# Patient Record
Sex: MALE | Race: OTHER | NOT HISPANIC OR LATINO
[De-identification: names, ages, dates, MRNs, and addresses within clinical notes are randomized per-mention and may not be internally consistent; named-entity substitution may affect disease eponyms.]

---

## 2015-05-05 RX ORDER — AMLODIPINE BESYLATE 2.5 MG/1
1 TABLET ORAL
Qty: 0 | Refills: 0 | DISCHARGE
Start: 2015-05-05

## 2017-01-04 ENCOUNTER — APPOINTMENT (OUTPATIENT)
Dept: CARDIOLOGY | Facility: CLINIC | Age: 52
End: 2017-01-04

## 2017-01-20 ENCOUNTER — APPOINTMENT (OUTPATIENT)
Dept: GASTROENTEROLOGY | Facility: CLINIC | Age: 52
End: 2017-01-20

## 2017-02-15 ENCOUNTER — APPOINTMENT (OUTPATIENT)
Dept: CARDIOLOGY | Facility: CLINIC | Age: 52
End: 2017-02-15

## 2017-03-08 ENCOUNTER — NON-APPOINTMENT (OUTPATIENT)
Age: 52
End: 2017-03-08

## 2017-03-08 ENCOUNTER — APPOINTMENT (OUTPATIENT)
Dept: CARDIOLOGY | Facility: CLINIC | Age: 52
End: 2017-03-08

## 2017-03-08 VITALS
HEIGHT: 68 IN | HEART RATE: 77 BPM | SYSTOLIC BLOOD PRESSURE: 119 MMHG | DIASTOLIC BLOOD PRESSURE: 76 MMHG | WEIGHT: 160 LBS | BODY MASS INDEX: 24.25 KG/M2 | OXYGEN SATURATION: 96 %

## 2017-03-08 DIAGNOSIS — I10 ESSENTIAL (PRIMARY) HYPERTENSION: ICD-10-CM

## 2017-06-21 ENCOUNTER — MEDICATION RENEWAL (OUTPATIENT)
Age: 52
End: 2017-06-21

## 2017-06-21 RX ORDER — SIMVASTATIN 20 MG/1
20 TABLET, FILM COATED ORAL
Qty: 30 | Refills: 0 | Status: DISCONTINUED | COMMUNITY
End: 2017-06-21

## 2017-08-15 ENCOUNTER — OUTPATIENT (OUTPATIENT)
Dept: OUTPATIENT SERVICES | Facility: HOSPITAL | Age: 52
LOS: 1 days | End: 2017-08-15

## 2017-08-15 DIAGNOSIS — I25.810 ATHEROSCLEROSIS OF CORONARY ARTERY BYPASS GRAFT(S) WITHOUT ANGINA PECTORIS: Chronic | ICD-10-CM

## 2017-08-16 DIAGNOSIS — A49.9 BACTERIAL INFECTION, UNSPECIFIED: ICD-10-CM

## 2017-08-22 ENCOUNTER — OTHER (OUTPATIENT)
Age: 52
End: 2017-08-22

## 2017-10-07 LAB
POTASSIUM SERPL-MCNC: 5.3 MMOL/L — SIGNIFICANT CHANGE UP (ref 3.5–5.3)
POTASSIUM SERPL-SCNC: 5.3 MMOL/L — SIGNIFICANT CHANGE UP (ref 3.5–5.3)

## 2017-11-09 LAB
POTASSIUM SERPL-MCNC: 5.7 MMOL/L — HIGH (ref 3.5–5.3)
POTASSIUM SERPL-SCNC: 5.7 MMOL/L — HIGH (ref 3.5–5.3)

## 2019-02-01 ENCOUNTER — APPOINTMENT (OUTPATIENT)
Dept: WOUND CARE | Facility: CLINIC | Age: 54
End: 2019-02-01
Payer: MEDICARE

## 2019-02-01 VITALS — DIASTOLIC BLOOD PRESSURE: 74 MMHG | HEART RATE: 85 BPM | SYSTOLIC BLOOD PRESSURE: 112 MMHG

## 2019-02-01 PROCEDURE — 99204 OFFICE O/P NEW MOD 45 MIN: CPT

## 2019-02-01 RX ORDER — ATORVASTATIN CALCIUM 10 MG/1
10 TABLET, FILM COATED ORAL DAILY
Qty: 90 | Refills: 1 | Status: DISCONTINUED | COMMUNITY
Start: 2017-06-21 | End: 2019-02-01

## 2019-02-02 NOTE — HISTORY OF PRESENT ILLNESS
[FreeTextEntry1] : Mr. EDGAR CLAUDIO   presents to the office with a wound for 2 weeks duration.  The wound is located on  the right lateral calf .  The patient has complaints of spasm in area at night.   The patient has been dressing the wound with antibiotic ointment, seemed to be worsening, saw primary care who ordered augmentin  last friday 1/25/19.  The patient denies fevers or chills.  The patient has localized pain to the wound upon dressing changes.  The patient has no other complaints or associated symptoms.  \par \par

## 2019-02-02 NOTE — PHYSICAL EXAM
[Normal Breath Sounds] : Normal breath sounds [Normal Heart Sounds] : normal heart sounds [Ankle Swelling Bilaterally] : bilaterally  [Calm] : calm [Ankle Swelling (On Exam)] : not present [de-identified] : well nourished, pleasant, good historian [de-identified] : soft, +BS [de-identified] : dialysis [de-identified] : see wound assessment [FreeTextEntry1] : anterior calf [FreeTextEntry2] : 0.5 [FreeTextEntry3] : 0.2 [FreeTextEntry4] : 0.1 cm [de-identified] : medihoney

## 2019-02-02 NOTE — REVIEW OF SYSTEMS
[General Appearance - Alert] : alert [General Appearance - In No Acute Distress] : in no acute distress [General Appearance - Well Nourished] : well nourished [General Appearance - Well Developed] : well developed [Sclera] : the sclera and conjunctiva were normal [Outer Ear] : the ears and nose were normal in appearance [Oropharynx] : the oropharynx was normal [Neck Appearance] : the appearance of the neck was normal [Neck Cervical Mass (___cm)] : no neck mass was observed [Jugular Venous Distention Increased] : there was no jugular-venous distention [Thyroid Diffuse Enlargement] : the thyroid was not enlarged [As Noted in HPI] : as noted in HPI [Thyroid Nodule] : there were no palpable thyroid nodules [Auscultation Breath Sounds / Voice Sounds] : lungs were clear to auscultation bilaterally [Skin Wound] : skin wound [Heart Rate And Rhythm] : heart rate was normal and rhythm regular [Negative] : Heme/Lymph [FreeTextEntry8] : dialysis pt. [Heart Sounds] : normal S1 and S2 [de-identified] : wound right calf [Heart Sounds Gallop] : no gallops [Murmurs] : no murmurs [Heart Sounds Pericardial Friction Rub] : no pericardial rub [Full Pulse] : the pedal pulses are present [Edema] : there was no peripheral edema [Bowel Sounds] : normal bowel sounds [Abdomen Soft] : soft [Abdomen Tenderness] : non-tender [] : no hepato-splenomegaly [Abdomen Mass (___ Cm)] : no abdominal mass palpated [Abdomen Hernia] : no hernia was discovered [Normal Sphincter Tone] : normal sphincter tone [No Rectal Mass] : no rectal mass [Internal Hemorrhoid] : internal hemorrhoids [Prostate Enlargement] : the prostate was not enlarged [Prostate Tenderness] : the prostate was not tender [Cervical Lymph Nodes Enlarged Posterior Bilaterally] : posterior cervical [Cervical Lymph Nodes Enlarged Anterior Bilaterally] : anterior cervical [Supraclavicular Lymph Nodes Enlarged Bilaterally] : supraclavicular [Axillary Lymph Nodes Enlarged Bilaterally] : axillary [Femoral Lymph Nodes Enlarged Bilaterally] : femoral [Inguinal Lymph Nodes Enlarged Bilaterally] : inguinal [No CVA Tenderness] : no ~M costovertebral angle tenderness [No Spinal Tenderness] : no spinal tenderness [Abnormal Walk] : normal gait [Nail Clubbing] : no clubbing  or cyanosis of the fingernails [Musculoskeletal - Swelling] : no joint swelling seen [Motor Tone] : muscle strength and tone were normal [Skin Color & Pigmentation] : normal skin color and pigmentation [Skin Turgor] : normal skin turgor [Oriented To Time, Place, And Person] : oriented to person, place, and time [Impaired Insight] : insight and judgment were intact [Affect] : the affect was normal [External Hemorrhoid] : no external hemorrhoids [Occult Blood Positive] : stool was negative for occult blood [FreeTextEntry1] : no cutaneous stigmata of chronic liver disease

## 2019-02-02 NOTE — ASSESSMENT
[FreeTextEntry1] : 53 yr old male PMH ESRD on hemodialysis for 9 yrs. presents with right calf wound for 2 weeks, was applying antibiotic ointment at first, then saw primary care who prescribed augmentin last week.\par Patient underwent evaluation for peripheral arterial disease using a [employer] diagnostic machine.  Ankle brachial index was obtained using blood pressure cuffs of the ankle and brachial segments of both legs.  A distal pulse volume recording was noted digitally on the device.  The procedure was supervised and interpreted by the physician.  VALE of the right lower extremity non detectable waveform.   VALE of the left lower extremity non detectable waveform.  Waveform noted to be monophasic.   Patient tolerated procedure well in the office.  Results discussed with the patient.\par Script given for vascular testing, f/u 1 week after vascular test done\par Pt. will do his own wound care, medihoney, savage\par finish antibiotic, Augmentin\[facility] \par

## 2019-02-25 ENCOUNTER — APPOINTMENT (OUTPATIENT)
Dept: VASCULAR SURGERY | Facility: CLINIC | Age: 54
End: 2019-02-25
Payer: MEDICARE

## 2019-02-25 ENCOUNTER — APPOINTMENT (OUTPATIENT)
Dept: WOUND CARE | Facility: CLINIC | Age: 54
End: 2019-02-25
Payer: MEDICARE

## 2019-02-25 DIAGNOSIS — N18.6 END STAGE RENAL DISEASE: ICD-10-CM

## 2019-02-25 DIAGNOSIS — N25.0 RENAL OSTEODYSTROPHY: ICD-10-CM

## 2019-02-25 DIAGNOSIS — L97.912 NON-PRESSURE CHRONIC ULCER OF UNSPECIFIED PART OF RIGHT LOWER LEG WITH FAT LAYER EXPOSED: ICD-10-CM

## 2019-02-25 PROCEDURE — 99213 OFFICE O/P EST LOW 20 MIN: CPT

## 2019-02-25 PROCEDURE — 93923 UPR/LXTR ART STDY 3+ LVLS: CPT

## 2019-02-25 NOTE — REVIEW OF SYSTEMS
[As Noted in HPI] : as noted in HPI [Skin Wound] : skin wound [Negative] : Heme/Lymph [FreeTextEntry8] : dialysis pt. [de-identified] : wound right calf

## 2019-02-25 NOTE — HISTORY OF PRESENT ILLNESS
[FreeTextEntry1] : Mr. EGDAR CLAUDIO   presents to the office with a wound for 2 weeks duration.  The wound is located on  the right lateral calf .  The patient has complaints of spasm in area at night.   The patient has been dressing the wound with antibiotic ointment, seemed to be worsening, saw primary care who ordered Augmentin  last Friday 1/25/19.  The patient denies fevers or chills.  The patient has localized pain to the wound upon dressing changes.  The patient has no other complaints or associated symptoms.\par 2/25/19-No VN- performs dressing changes himself- Bought supplies on [employer]\par On HD x 9 yrs  \par \par

## 2019-02-25 NOTE — ASSESSMENT
[FreeTextEntry1] : 53 yr old male PMH ESRD on hemodialysis for 9 yrs. presents with right calf wound for 2 weeks, was applying antibiotic ointment at first, then saw primary care who prescribed Augmentin last week.\par Patient underwent evaluation for peripheral arterial disease using a [employer] diagnostic machine.  Ankle brachial index was obtained using blood pressure cuffs of the ankle and brachial segments of both legs.  A distal pulse volume recording was noted digitally on the device.  The procedure was supervised and interpreted by the physician.  VALE of the right lower extremity non detectable waveform.   VALE of the left lower extremity non detectable waveform.  Waveform noted to be monophasic.   Patient tolerated procedure well in the office.  Results discussed with the patient.\par Script given for vascular testing, f/u 1 week after vascular test done -- Vascular exam completed 2/25, results discussed.\par Pt. will do his own wound care, savage Torre\par 2/25/19 - discussed possible calciphylaxis, and need to report additional , or worsening of lesions\par Will continue Honey as wounds reported as improved\par \par \par \par

## 2019-02-25 NOTE — PHYSICAL EXAM
[Calm] : calm [JVD] : no jugular venous distention  [Skin Ulcer] : ulcer [Alert] : alert [de-identified] : well nourished, pleasant, good historian [de-identified] : non labored [FreeTextEntry1] : lateral calf prox [FreeTextEntry2] : .5 [FreeTextEntry3] : 0.3 [FreeTextEntry4] : 0.1 cm [de-identified] : ?calciphylaxis? [de-identified] : curette [de-identified] : Medihoney [FreeTextEntry7] : lateral calf distal [FreeTextEntry8] : .5 [FreeTextEntry9] : .5 [de-identified] : 0.1 [de-identified] : ?calciphylaxis? [FreeTextEntry6] : curette [de-identified] : medihoney

## 2019-03-11 ENCOUNTER — APPOINTMENT (OUTPATIENT)
Dept: WOUND CARE | Facility: CLINIC | Age: 54
End: 2019-03-11

## 2019-05-01 ENCOUNTER — APPOINTMENT (OUTPATIENT)
Dept: CARDIOLOGY | Facility: CLINIC | Age: 54
End: 2019-05-01

## 2020-01-22 ENCOUNTER — APPOINTMENT (OUTPATIENT)
Dept: NEPHROLOGY | Facility: CLINIC | Age: 55
End: 2020-01-22

## 2020-01-22 ENCOUNTER — APPOINTMENT (OUTPATIENT)
Dept: TRANSPLANT | Facility: CLINIC | Age: 55
End: 2020-01-22

## 2020-07-29 ENCOUNTER — APPOINTMENT (OUTPATIENT)
Dept: CARDIOLOGY | Facility: CLINIC | Age: 55
End: 2020-07-29

## 2020-12-02 ENCOUNTER — APPOINTMENT (OUTPATIENT)
Dept: HEPATOLOGY | Facility: CLINIC | Age: 55
End: 2020-12-02
Payer: MEDICARE

## 2020-12-02 ENCOUNTER — APPOINTMENT (OUTPATIENT)
Dept: HEPATOLOGY | Facility: CLINIC | Age: 55
End: 2020-12-02

## 2020-12-02 VITALS
RESPIRATION RATE: 16 BRPM | WEIGHT: 167 LBS | DIASTOLIC BLOOD PRESSURE: 93 MMHG | SYSTOLIC BLOOD PRESSURE: 169 MMHG | BODY MASS INDEX: 25.31 KG/M2 | OXYGEN SATURATION: 100 % | HEART RATE: 67 BPM | HEIGHT: 68 IN | TEMPERATURE: 97.6 F

## 2020-12-02 DIAGNOSIS — B18.2 CHRONIC VIRAL HEPATITIS C: ICD-10-CM

## 2020-12-02 PROCEDURE — 99204 OFFICE O/P NEW MOD 45 MIN: CPT

## 2020-12-02 NOTE — ASSESSMENT
[FreeTextEntry1] : 54 y/o M w/ history of hypertension and ESRD on HD for years, previously seen for HCV back in 2016 but never followed up after confirmation.\par \par 1. HCV - will repeat w/u in anticipation for HCV treatment. Suspect that he has SULTANA as well.\par - repeat Fibroscan and Ultrasound ordered.\par \par 2. Polycystic kidney disease, ESRD on HD - will reach out to kidney transplant team to see if he needs to follow-up with them.\par \par 3. RHM\par - immune to HBV and HAV\par - needs colonoscopy, will plan to schedule at next appt.\par \par I have explained to the patient the natural history of hepatitis C virus infection including the potential progression to cirrhosis and risk of HCC. I have also discussed with Mr. EDGAR CLAUDIO current FDA approved hepatitis C treatment options, drug and drug interaction, risks and benefits, side effects and SVR of the various treatment regimens.\par Mr. EDGAR CLAUDIO is interested in HCV treatment and is agreeable to it. \par \par I have ordered blood work to screen for concurrent liver disease (including HBV), HCV genotype, HCV antibody with reflex to RNA PCR, and a Fibroscan to assess for cirrhosis. We will proceed with authorization and approval for HCV treatment when above tests are completed.\par \par I have advised the patient on maintaining a healthy lifestyle which includes keeping a healthy diet (Mediterranean diet is preferred), calorie reduction of 30%, and regular exercise of at least 150 minutes a week to improve His fatty liver disease. I have also advised Mr. CLAUDIO on avoidance of hepatotoxic agents and alcohol.\par \par RTC 2 months

## 2020-12-02 NOTE — REVIEW OF SYSTEMS
[Feeling Tired] : feeling tired [Vomiting] : vomiting [Limb Weakness] : limb weakness [Difficulty Walking] : difficulty walking [Fever] : no fever [Chills] : no chills [Recent Weight Gain (___ Lbs)] : no recent weight gain [Recent Weight Loss (___ Lbs)] : no recent weight loss [Eye Pain] : no eye pain [Red Eyes] : eyes not red [Loss Of Hearing] : no hearing loss [Nosebleeds] : no nosebleeds [Sore Throat] : no sore throat [Chest Pain] : no chest pain [Palpitations] : no palpitations [Abdominal Pain] : no abdominal pain [Constipation] : no constipation [Diarrhea] : no diarrhea [Melena] : no melena [Dysuria] : no dysuria [Joint Pain] : no joint pain [Limb Swelling] : no limb swelling [Skin Lesions] : no skin lesions [Itching] : no itching [Confused] : no confusion [Convulsions] : no convulsions [Fainting] : no fainting [Anxiety] : no anxiety [Depression] : no depression [Proptosis] : no proptosis [Muscle Weakness] : no muscle weakness [Feelings Of Weakness] : no feelings of weakness [Easy Bleeding] : no tendency for easy bleeding [Easy Bruising] : no tendency for easy bruising

## 2020-12-02 NOTE — PHYSICAL EXAM
[General Appearance - Alert] : alert [General Appearance - In No Acute Distress] : in no acute distress [General Appearance - Well Nourished] : well nourished [General Appearance - Well Developed] : well developed [Sclera] : the sclera and conjunctiva were normal [PERRL With Normal Accommodation] : pupils were equal in size, round, and reactive to light [Extraocular Movements] : extraocular movements were intact [Outer Ear] : the ears and nose were normal in appearance [Oropharynx] : the oropharynx was normal [Neck Appearance] : the appearance of the neck was normal [Neck Cervical Mass (___cm)] : no neck mass was observed [Jugular Venous Distention Increased] : there was no jugular-venous distention [Auscultation Breath Sounds / Voice Sounds] : lungs were clear to auscultation bilaterally [Edema] : there was no peripheral edema [Bowel Sounds] : normal bowel sounds [Abdomen Soft] : soft [Abdomen Tenderness] : non-tender [Abdomen Mass (___ Cm)] : no abdominal mass palpated [Cervical Lymph Nodes Enlarged Posterior Bilaterally] : posterior cervical [Cervical Lymph Nodes Enlarged Anterior Bilaterally] : anterior cervical [No CVA Tenderness] : no ~M costovertebral angle tenderness [No Spinal Tenderness] : no spinal tenderness [Involuntary Movements] : no involuntary movements were seen [Skin Turgor] : normal skin turgor [] : no rash [Oriented To Time, Place, And Person] : oriented to person, place, and time [Impaired Insight] : insight and judgment were intact [Affect] : the affect was normal [Scleral Icterus] : No Scleral Icterus [Spider Angioma] : No spider angioma(s) were observed [Abdominal  Ascites] : no ascites [Splenomegaly] : no splenomegaly [Liver Palpable ___ Finger Breadths Below Costal Margin] : was not palpable below costal margin [Asterixis] : no asterixis observed [Jaundice] : No jaundice [FreeTextEntry1] : on wheelchair

## 2020-12-02 NOTE — HISTORY OF PRESENT ILLNESS
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 He is immune to hepatitis A and B. \par He denies IVDA, body piercing, tattoos, blood transfusion or alcohol use. \par He denies family history of liver disease or liver cancer or jaundice. \par BW - FEb. 2016 revealed normal AST, ALT was 6,  and normal TB. normal albumin and TP.\par LFTs 2 years ago revealed AST 52, ALT 85, , TB 0.7. normal Albumin and TP. \par US of the abdomen from March 2, 2016 reported multiple simple hepatic cysts, polycystic kidney disease, borderline splenomegaly and no ascites.\par BW on 3/13/16 - HCV RNA+, genotype not possible due to low viral load. ferritin 505.\par Fibroscan 4/22/16 - 8.6 kPa c/w F1-F2 disease.\par Never had a colonoscopy. Has not followed up in kidney transplant clinic but per last note, needs improved ambulation, colonoscopy, and cardiology eval for that.

## 2020-12-02 NOTE — CONSULT LETTER
[Mohamud Tuttle M.D.] : Mohamud Tuttle M.D. [Dear  ___] : Dear  [unfilled], [Consult Letter:] : I had the pleasure of evaluating your patient, [unfilled]. [( Thank you for referring [unfilled] for consultation for _____ )] : Thank you for referring [unfilled] for consultation for [unfilled] [Please see my note below.] : Please see my note below. [Consult Closing:] : Thank you very much for allowing me to participate in the care of this patient.  If you have any questions, please do not hesitate to contact me. [Sincerely,] : Sincerely, [FreeTextEntry2] : Tuan Collins MD [FreeTextEntry3] : Dr. Bashir Schaefer MD\par  of Medicine\par [facility]\par [facility]\par

## 2020-12-03 LAB
AFP-TM SERPL-MCNC: 4.5 NG/ML
ALBUMIN SERPL ELPH-MCNC: 4.5 G/DL
ALP BLD-CCNC: 71 U/L
ALT SERPL-CCNC: 11 U/L
ANION GAP SERPL CALC-SCNC: 14 MMOL/L
AST SERPL-CCNC: 13 U/L
BASOPHILS # BLD AUTO: 0.05 K/UL
BASOPHILS NFR BLD AUTO: 1.3 %
BILIRUB SERPL-MCNC: 0.8 MG/DL
BUN SERPL-MCNC: 27 MG/DL
CALCIUM SERPL-MCNC: 9.3 MG/DL
CHLORIDE SERPL-SCNC: 97 MMOL/L
CO2 SERPL-SCNC: 26 MMOL/L
CREAT SERPL-MCNC: 6.41 MG/DL
EOSINOPHIL # BLD AUTO: 0.28 K/UL
EOSINOPHIL NFR BLD AUTO: 7.2 %
FERRITIN SERPL-MCNC: 617 NG/ML
GGT SERPL-CCNC: 12 U/L
GLUCOSE SERPL-MCNC: 89 MG/DL
HBV SURFACE AB SER QL: REACTIVE
HBV SURFACE AG SER QL: NONREACTIVE
HCT VFR BLD CALC: 38.5 %
HCV RNA SERPL NAA DL=5-ACNC: NOT DETECTED IU/ML
HCV RNA SERPL NAA+PROBE-LOG IU: NOT DETECTED LOG10IU/ML
HEPATITIS A IGG ANTIBODY: REACTIVE
HGB BLD-MCNC: 12.7 G/DL
HIV1+2 AB SPEC QL IA.RAPID: NONREACTIVE
IMM GRANULOCYTES NFR BLD AUTO: 0.3 %
INR PPP: 1.08 RATIO
LYMPHOCYTES # BLD AUTO: 1.17 K/UL
LYMPHOCYTES NFR BLD AUTO: 30 %
MAN DIFF?: NORMAL
MCHC RBC-ENTMCNC: 31.3 PG
MCHC RBC-ENTMCNC: 33 GM/DL
MCV RBC AUTO: 94.8 FL
MONOCYTES # BLD AUTO: 0.32 K/UL
MONOCYTES NFR BLD AUTO: 8.2 %
NEUTROPHILS # BLD AUTO: 2.07 K/UL
NEUTROPHILS NFR BLD AUTO: 53 %
PLATELET # BLD AUTO: 171 K/UL
POTASSIUM SERPL-SCNC: 5.7 MMOL/L
PROT SERPL-MCNC: 7.8 G/DL
PT BLD: 12.7 SEC
RBC # BLD: 4.06 M/UL
RBC # FLD: 13.7 %
SARS-COV-2 IGG SERPL IA-ACNC: <0.1 INDEX
SARS-COV-2 IGG SERPL QL IA: NEGATIVE
SODIUM SERPL-SCNC: 137 MMOL/L
WBC # FLD AUTO: 3.9 K/UL

## 2020-12-07 LAB — HCV GENTYP BLD NAA+PROBE: NOT DETECTED

## 2020-12-16 ENCOUNTER — APPOINTMENT (OUTPATIENT)
Dept: HEPATOLOGY | Facility: CLINIC | Age: 55
End: 2020-12-16

## 2021-01-06 ENCOUNTER — APPOINTMENT (OUTPATIENT)
Dept: HEPATOLOGY | Facility: CLINIC | Age: 56
End: 2021-01-06

## 2021-02-03 ENCOUNTER — APPOINTMENT (OUTPATIENT)
Dept: HEPATOLOGY | Facility: CLINIC | Age: 56
End: 2021-02-03

## 2021-03-01 ENCOUNTER — APPOINTMENT (OUTPATIENT)
Dept: HEPATOLOGY | Facility: CLINIC | Age: 56
End: 2021-03-01

## 2021-06-30 ENCOUNTER — APPOINTMENT (OUTPATIENT)
Dept: CARDIOLOGY | Facility: CLINIC | Age: 56
End: 2021-06-30
Payer: MEDICARE

## 2021-06-30 ENCOUNTER — NON-APPOINTMENT (OUTPATIENT)
Age: 56
End: 2021-06-30

## 2021-06-30 VITALS
OXYGEN SATURATION: 98 % | BODY MASS INDEX: 27.06 KG/M2 | HEIGHT: 68 IN | DIASTOLIC BLOOD PRESSURE: 84 MMHG | HEART RATE: 67 BPM | SYSTOLIC BLOOD PRESSURE: 147 MMHG | WEIGHT: 178.57 LBS

## 2021-06-30 PROCEDURE — 99204 OFFICE O/P NEW MOD 45 MIN: CPT

## 2021-06-30 PROCEDURE — 93000 ELECTROCARDIOGRAM COMPLETE: CPT

## 2021-06-30 RX ORDER — FERRIC CITRATE 210 MG/1
1 GM TABLET, COATED ORAL
Refills: 0 | Status: ACTIVE | COMMUNITY
Start: 2021-06-30

## 2021-06-30 NOTE — PHYSICAL EXAM
[General Appearance - Well Developed] : well developed [Normal Appearance] : normal appearance [Well Groomed] : well groomed [No Deformities] : no deformities [General Appearance - Well Nourished] : well nourished [General Appearance - In No Acute Distress] : no acute distress [Normal Conjunctiva] : the conjunctiva exhibited no abnormalities [Eyelids - No Xanthelasma] : the eyelids demonstrated no xanthelasmas [Normal Oral Mucosa] : normal oral mucosa [No Oral Pallor] : no oral pallor [No Oral Cyanosis] : no oral cyanosis [Normal Jugular Venous A Waves Present] : normal jugular venous A waves present [Normal Jugular Venous V Waves Present] : normal jugular venous V waves present [No Jugular Venous Chaves A Waves] : no jugular venous chaves A waves [Respiration, Rhythm And Depth] : normal respiratory rhythm and effort [Exaggerated Use Of Accessory Muscles For Inspiration] : no accessory muscle use [Auscultation Breath Sounds / Voice Sounds] : lungs were clear to auscultation bilaterally [Heart Sounds] : normal S1 and S2 [Heart Rate And Rhythm] : heart rate and rhythm were normal [FreeTextEntry1] : 2/6 margaret [Abdomen Soft] : soft [Abdomen Tenderness] : non-tender [Abdomen Mass (___ Cm)] : no abdominal mass palpated [Abnormal Walk] : normal gait [Gait - Sufficient For Exercise Testing] : the gait was sufficient for exercise testing [Nail Clubbing] : no clubbing of the fingernails [Cyanosis, Localized] : no localized cyanosis [Petechial Hemorrhages (___cm)] : no petechial hemorrhages [] : no ischemic changes

## 2021-06-30 NOTE — HISTORY OF PRESENT ILLNESS
[FreeTextEntry1] : He is s/p cabg in 2014..  He had bilateral hip replacements in 2016. He was unable to walk before his hip replacement.  He is now able to walk with a walker for 30 minutes. No chest pain or sob.  Follows a diet.  Still waiting for a kidney transplant.

## 2022-01-05 ENCOUNTER — APPOINTMENT (OUTPATIENT)
Dept: CARDIOLOGY | Facility: CLINIC | Age: 57
End: 2022-01-05

## 2022-02-03 ENCOUNTER — RX RENEWAL (OUTPATIENT)
Age: 57
End: 2022-02-03

## 2022-02-03 RX ORDER — ISOSORBIDE MONONITRATE 30 MG/1
30 TABLET, EXTENDED RELEASE ORAL
Qty: 90 | Refills: 1 | Status: ACTIVE | COMMUNITY
Start: 2022-02-03 | End: 1900-01-01

## 2022-06-10 ENCOUNTER — APPOINTMENT (OUTPATIENT)
Dept: GASTROENTEROLOGY | Facility: CLINIC | Age: 57
End: 2022-06-10

## 2022-06-23 ENCOUNTER — APPOINTMENT (OUTPATIENT)
Dept: CARDIOLOGY | Facility: CLINIC | Age: 57
End: 2022-06-23

## 2022-07-06 ENCOUNTER — APPOINTMENT (OUTPATIENT)
Dept: HEPATOLOGY | Facility: CLINIC | Age: 57
End: 2022-07-06

## 2022-08-01 ENCOUNTER — RX RENEWAL (OUTPATIENT)
Age: 57
End: 2022-08-01

## 2022-09-28 ENCOUNTER — NON-APPOINTMENT (OUTPATIENT)
Age: 57
End: 2022-09-28

## 2022-09-28 ENCOUNTER — APPOINTMENT (OUTPATIENT)
Dept: CARDIOLOGY | Facility: CLINIC | Age: 57
End: 2022-09-28
Payer: MEDICARE

## 2022-09-28 VITALS
BODY MASS INDEX: 27.4 KG/M2 | OXYGEN SATURATION: 100 % | SYSTOLIC BLOOD PRESSURE: 154 MMHG | DIASTOLIC BLOOD PRESSURE: 79 MMHG | HEIGHT: 68 IN | HEART RATE: 70 BPM | WEIGHT: 180.78 LBS

## 2022-09-28 PROCEDURE — 93000 ELECTROCARDIOGRAM COMPLETE: CPT

## 2022-09-28 PROCEDURE — 99214 OFFICE O/P EST MOD 30 MIN: CPT

## 2022-09-28 NOTE — CARDIOLOGY SUMMARY
[___] : [unfilled]
I personally saw the patient with the PA, and completed the key components of the history and physical exam. I then discussed the management plan with the PA.

## 2022-09-28 NOTE — REASON FOR VISIT
[Symptom and Test Evaluation] : symptom and test evaluation [Consultation] : a consultation regarding [Coronary Artery Disease] : coronary artery disease

## 2022-10-11 ENCOUNTER — APPOINTMENT (OUTPATIENT)
Dept: GASTROENTEROLOGY | Facility: CLINIC | Age: 57
End: 2022-10-11

## 2022-10-14 ENCOUNTER — APPOINTMENT (OUTPATIENT)
Dept: CARDIOLOGY | Facility: CLINIC | Age: 57
End: 2022-10-14

## 2022-10-14 ENCOUNTER — INPATIENT (INPATIENT)
Facility: HOSPITAL | Age: 57
LOS: 8 days | Discharge: HOME HEALTH | End: 2022-10-23
Attending: INTERNAL MEDICINE | Admitting: INTERNAL MEDICINE
Payer: MEDICARE

## 2022-10-14 VITALS
SYSTOLIC BLOOD PRESSURE: 157 MMHG | HEIGHT: 68 IN | TEMPERATURE: 98 F | DIASTOLIC BLOOD PRESSURE: 79 MMHG | HEART RATE: 67 BPM | RESPIRATION RATE: 18 BRPM | OXYGEN SATURATION: 99 %

## 2022-10-14 DIAGNOSIS — I25.810 ATHEROSCLEROSIS OF CORONARY ARTERY BYPASS GRAFT(S) WITHOUT ANGINA PECTORIS: Chronic | ICD-10-CM

## 2022-10-14 LAB
ALBUMIN SERPL ELPH-MCNC: 3.4 G/DL — SIGNIFICANT CHANGE UP (ref 3.3–5)
ALP SERPL-CCNC: 157 U/L — HIGH (ref 40–120)
ALT FLD-CCNC: 15 U/L — SIGNIFICANT CHANGE UP (ref 4–41)
ANION GAP SERPL CALC-SCNC: 13 MMOL/L — SIGNIFICANT CHANGE UP (ref 7–14)
AST SERPL-CCNC: 34 U/L — SIGNIFICANT CHANGE UP (ref 4–40)
BASOPHILS # BLD AUTO: 0.02 K/UL — SIGNIFICANT CHANGE UP (ref 0–0.2)
BASOPHILS NFR BLD AUTO: 0.9 % — SIGNIFICANT CHANGE UP (ref 0–2)
BILIRUB SERPL-MCNC: 0.8 MG/DL — SIGNIFICANT CHANGE UP (ref 0.2–1.2)
BLOOD GAS VENOUS COMPREHENSIVE RESULT: SIGNIFICANT CHANGE UP
BUN SERPL-MCNC: 33 MG/DL — HIGH (ref 7–23)
CALCIUM SERPL-MCNC: 10.7 MG/DL — HIGH (ref 8.4–10.5)
CHLORIDE SERPL-SCNC: 94 MMOL/L — LOW (ref 98–107)
CO2 SERPL-SCNC: 27 MMOL/L — SIGNIFICANT CHANGE UP (ref 22–31)
CREAT SERPL-MCNC: 6.67 MG/DL — HIGH (ref 0.5–1.3)
EGFR: 9 ML/MIN/1.73M2 — LOW
EOSINOPHIL # BLD AUTO: 0 K/UL — SIGNIFICANT CHANGE UP (ref 0–0.5)
EOSINOPHIL NFR BLD AUTO: 0 % — SIGNIFICANT CHANGE UP (ref 0–6)
FLUAV AG NPH QL: SIGNIFICANT CHANGE UP
FLUBV AG NPH QL: SIGNIFICANT CHANGE UP
GLUCOSE SERPL-MCNC: 76 MG/DL — SIGNIFICANT CHANGE UP (ref 70–99)
HCT VFR BLD CALC: 31.8 % — LOW (ref 39–50)
HGB BLD-MCNC: 10.5 G/DL — LOW (ref 13–17)
IANC: 1.11 K/UL — LOW (ref 1.8–7.4)
LYMPHOCYTES # BLD AUTO: 0.51 K/UL — LOW (ref 1–3.3)
LYMPHOCYTES # BLD AUTO: 22.6 % — SIGNIFICANT CHANGE UP (ref 13–44)
MAGNESIUM SERPL-MCNC: 2.1 MG/DL — SIGNIFICANT CHANGE UP (ref 1.6–2.6)
MCHC RBC-ENTMCNC: 31.9 PG — SIGNIFICANT CHANGE UP (ref 27–34)
MCHC RBC-ENTMCNC: 33 GM/DL — SIGNIFICANT CHANGE UP (ref 32–36)
MCV RBC AUTO: 96.7 FL — SIGNIFICANT CHANGE UP (ref 80–100)
MONOCYTES # BLD AUTO: 0.18 K/UL — SIGNIFICANT CHANGE UP (ref 0–0.9)
MONOCYTES NFR BLD AUTO: 7.8 % — SIGNIFICANT CHANGE UP (ref 2–14)
NEUTROPHILS # BLD AUTO: 1.45 K/UL — LOW (ref 1.8–7.4)
NEUTROPHILS NFR BLD AUTO: 64.4 % — SIGNIFICANT CHANGE UP (ref 43–77)
PLATELET # BLD AUTO: 72 K/UL — LOW (ref 150–400)
POTASSIUM SERPL-MCNC: 4.8 MMOL/L — SIGNIFICANT CHANGE UP (ref 3.5–5.3)
POTASSIUM SERPL-SCNC: 4.8 MMOL/L — SIGNIFICANT CHANGE UP (ref 3.5–5.3)
PROT SERPL-MCNC: 6.2 G/DL — SIGNIFICANT CHANGE UP (ref 6–8.3)
RBC # BLD: 3.29 M/UL — LOW (ref 4.2–5.8)
RBC # FLD: 13.6 % — SIGNIFICANT CHANGE UP (ref 10.3–14.5)
RSV RNA NPH QL NAA+NON-PROBE: SIGNIFICANT CHANGE UP
SARS-COV-2 RNA SPEC QL NAA+PROBE: DETECTED
SODIUM SERPL-SCNC: 134 MMOL/L — LOW (ref 135–145)
WBC # BLD: 2.25 K/UL — LOW (ref 3.8–10.5)
WBC # FLD AUTO: 2.25 K/UL — LOW (ref 3.8–10.5)

## 2022-10-14 PROCEDURE — 99285 EMERGENCY DEPT VISIT HI MDM: CPT | Mod: CS,GC

## 2022-10-14 NOTE — ED PROVIDER NOTE - PROGRESS NOTE DETAILS
cxr clear, labs c/w prior.  However, pt had dialysis rescheduled this week to T/W/F and missed Friday due to not feeling well.  concerned he will not be able to get dialysis again until monday.  Hospitalist pgd for admission for dialysis tmrw. admitted to [facility]

## 2022-10-14 NOTE — ED PROVIDER NOTE - NSICDXPASTSURGICALHX_GEN_ALL_CORE_FT
PAST SURGICAL HISTORY:  AV fistula     Coronary artery disease involving other coronary artery bypass graft     Stented coronary artery

## 2022-10-14 NOTE — ED PROVIDER NOTE - NSICDXPASTMEDICALHX_GEN_ALL_CORE_FT
PAST MEDICAL HISTORY:  Coronary artery disease     End stage renal disease on dialysis     Hepatitis C     HTN (hypertension)     Polycystic kidney     Polycystic liver disease

## 2022-10-14 NOTE — ED ADULT NURSE NOTE - NS ED NURSE RECORD ANOTHER HT AND WT
Spoke to patient's mother, got school contact information. Advised that office will reach out to school contact for further information regarding what they are needing. Mother verbalized understanding.    ----- Message from Darcy Mullins sent at 8/9/2022  9:50 AM CDT -----  Returning a phone call.    Who left a message for the patient: Kimberly     Do they know what this is regarding: order for school    Would they like a phone call back or a response via [facility]:  call [phone]          No

## 2022-10-14 NOTE — ED PROVIDER NOTE - PHYSICAL EXAMINATION
GENERAL: in no acute distress, non-toxic appearing  HEAD: normocephalic, atraumatic  HEENT: normal conjunctiva  CARDIAC: regular rate and rhythm, normal S1S2, no appreciable murmurs  PULM: coarse lung sounds b/l  GI: abdomen nondistended, soft, nontender, no guarding, rebound tenderness  : no suprapubic tenderness  NEURO: moving all 4 extremities, no focal deficits, normal speech, AAOx3   MSK: no peripheral edema, no calf tenderness b/l  SKIN: well-perfused, extremities warm, no visible rashes  PSYCH: appropriate mood and affect

## 2022-10-14 NOTE — ED ADULT NURSE NOTE - OBJECTIVE STATEMENT
Patient received to room 9, A/Ox4, ambulatory with walker, c/o COVID symptoms. Hx ESRD, on dialysis T/Th/S (left AV fistula). Patient states he has had cough x months. States cough became productive x 3 days and endorses diarrhea x 3 days. Denies chest pain, fever, chills, N/V, and SOB. Placed on cardiac monitor, NSR. 20G IV placed to right hand. Safety measures maintained, call bell within reach.

## 2022-10-14 NOTE — ED PROVIDER NOTE - OBJECTIVE STATEMENT
Jay PGY1 - 56 yo M with h/o ESRD on T/Th/S dialysis 2/2 PKD, CAD s/p stents p/w covid+, uri sx and diarrhea for the past 2-3 days.  Pt reports that he tested positive for COVID about 8 days ago, but sx progressed over the past 2-3 days to include cough and diarrhea.  In discussing illness with nephrologist who changed his dialysis this week to T/W/F.  He missed the Friday dialysis today due to feeling poorly. Intermittent headache.  Otherwise, no CP, SOB, n/v/abdominal pain, dysuria, peripheral edema, fever/chills.

## 2022-10-14 NOTE — ED ADULT NURSE NOTE - NSIMPLEMENTINTERV_GEN_ALL_ED
Implemented All Fall Risk Interventions:  [place] to call system. Call bell, personal items and telephone within reach. Instruct patient to call for assistance. Room bathroom lighting operational. Non-slip footwear when patient is off stretcher. Physically safe environment: no spills, clutter or unnecessary equipment. Stretcher in lowest position, wheels locked, appropriate side rails in place. Provide visual cue, wrist band, yellow gown, etc. Monitor gait and stability. Monitor for mental status changes and reorient to person, place, and time. Review medications for side effects contributing to fall risk. Reinforce activity limits and safety measures with patient and family.

## 2022-10-14 NOTE — ED PROVIDER NOTE - CLINICAL SUMMARY MEDICAL DECISION MAKING FREE TEXT BOX
Jay PGY1 - 58 yo M with h/o ESRD on T/Th/S dialysis 2/2 PKD, CAD s/p stents p/w covid+, uri sx and diarrhea for the past 2-3 days.  Concern for electrolyte abnormalities and volume depletion due to missed dialysis and diarrhea.  Labs, ekg, cxr ordered.

## 2022-10-14 NOTE — ED PROVIDER NOTE - ATTENDING CONTRIBUTION TO CARE
Seen and examined, states 1 wk hx of covid, 2-3d of inc. cough and diarrhea, dec. po intake, gen. weakness, usually has dialysis Tues./Thurs/Sat, off schedule this wk., had HD Tues/Wed. and was supposed to have yest. but skipped due to feeling weak. No CP/N/V/SOB/palpitations. No orthopnea/edema/PND. MM sl. dry, clear lungs, heart reg, abd soft, NT to palp, no CVAT, no edema.

## 2022-10-14 NOTE — ED ADULT TRIAGE NOTE - CHIEF COMPLAINT QUOTE
Patient brought to ER by EMS from home for c/o positive covid after exposure 8 days ago and diarrhea for past two days. Pt has dialysis Tues, Thurs, Sat. ( He got it Tues, Wednesday and was supposed to get it today at 6pm)

## 2022-10-15 DIAGNOSIS — Z29.9 ENCOUNTER FOR PROPHYLACTIC MEASURES, UNSPECIFIED: ICD-10-CM

## 2022-10-15 DIAGNOSIS — N18.6 END STAGE RENAL DISEASE: ICD-10-CM

## 2022-10-15 DIAGNOSIS — U07.1 COVID-19: ICD-10-CM

## 2022-10-15 DIAGNOSIS — D64.9 ANEMIA, UNSPECIFIED: ICD-10-CM

## 2022-10-15 DIAGNOSIS — D69.6 THROMBOCYTOPENIA, UNSPECIFIED: ICD-10-CM

## 2022-10-15 DIAGNOSIS — I25.10 ATHEROSCLEROTIC HEART DISEASE OF NATIVE CORONARY ARTERY WITHOUT ANGINA PECTORIS: ICD-10-CM

## 2022-10-15 DIAGNOSIS — I10 ESSENTIAL (PRIMARY) HYPERTENSION: ICD-10-CM

## 2022-10-15 LAB
ALBUMIN SERPL ELPH-MCNC: 3.1 G/DL — LOW (ref 3.3–5)
ALP SERPL-CCNC: 136 U/L — HIGH (ref 40–120)
ALT FLD-CCNC: 11 U/L — SIGNIFICANT CHANGE UP (ref 4–41)
ANION GAP SERPL CALC-SCNC: 16 MMOL/L — HIGH (ref 7–14)
AST SERPL-CCNC: 31 U/L — SIGNIFICANT CHANGE UP (ref 4–40)
BILIRUB SERPL-MCNC: 0.8 MG/DL — SIGNIFICANT CHANGE UP (ref 0.2–1.2)
BUN SERPL-MCNC: 40 MG/DL — HIGH (ref 7–23)
CALCIUM SERPL-MCNC: 10.2 MG/DL — SIGNIFICANT CHANGE UP (ref 8.4–10.5)
CHLORIDE SERPL-SCNC: 94 MMOL/L — LOW (ref 98–107)
CO2 SERPL-SCNC: 24 MMOL/L — SIGNIFICANT CHANGE UP (ref 22–31)
CREAT SERPL-MCNC: 7.55 MG/DL — HIGH (ref 0.5–1.3)
DIALYSIS INSTRUMENT RESULT - HEPATITIS B SURFACE ANTIGEN: NEGATIVE — SIGNIFICANT CHANGE UP
EGFR: 8 ML/MIN/1.73M2 — LOW
GLUCOSE SERPL-MCNC: 68 MG/DL — LOW (ref 70–99)
HCT VFR BLD CALC: 29.9 % — LOW (ref 39–50)
HGB BLD-MCNC: 9.6 G/DL — LOW (ref 13–17)
HIV 1+2 AB+HIV1 P24 AG SERPL QL IA: SIGNIFICANT CHANGE UP
MCHC RBC-ENTMCNC: 31.2 PG — SIGNIFICANT CHANGE UP (ref 27–34)
MCHC RBC-ENTMCNC: 32.1 GM/DL — SIGNIFICANT CHANGE UP (ref 32–36)
MCV RBC AUTO: 97.1 FL — SIGNIFICANT CHANGE UP (ref 80–100)
NRBC # BLD: 0 /100 WBCS — SIGNIFICANT CHANGE UP (ref 0–0)
NRBC # FLD: 0 K/UL — SIGNIFICANT CHANGE UP (ref 0–0)
PLATELET # BLD AUTO: 63 K/UL — LOW (ref 150–400)
POTASSIUM SERPL-MCNC: 4.6 MMOL/L — SIGNIFICANT CHANGE UP (ref 3.5–5.3)
POTASSIUM SERPL-SCNC: 4.6 MMOL/L — SIGNIFICANT CHANGE UP (ref 3.5–5.3)
PROT SERPL-MCNC: 5.8 G/DL — LOW (ref 6–8.3)
RBC # BLD: 3.08 M/UL — LOW (ref 4.2–5.8)
RBC # FLD: 13.5 % — SIGNIFICANT CHANGE UP (ref 10.3–14.5)
SODIUM SERPL-SCNC: 134 MMOL/L — LOW (ref 135–145)
WBC # BLD: 1.51 K/UL — LOW (ref 3.8–10.5)
WBC # FLD AUTO: 1.51 K/UL — LOW (ref 3.8–10.5)

## 2022-10-15 PROCEDURE — 99223 1ST HOSP IP/OBS HIGH 75: CPT

## 2022-10-15 PROCEDURE — 71045 X-RAY EXAM CHEST 1 VIEW: CPT | Mod: 26

## 2022-10-15 RX ORDER — ISOSORBIDE MONONITRATE 60 MG/1
1 TABLET, EXTENDED RELEASE ORAL
Qty: 0 | Refills: 0 | DISCHARGE

## 2022-10-15 RX ORDER — ACETAMINOPHEN 500 MG
975 TABLET ORAL ONCE
Refills: 0 | Status: COMPLETED | OUTPATIENT
Start: 2022-10-15 | End: 2022-10-15

## 2022-10-15 RX ORDER — HEPARIN SODIUM 5000 [USP'U]/ML
5000 INJECTION INTRAVENOUS; SUBCUTANEOUS EVERY 8 HOURS
Refills: 0 | Status: DISCONTINUED | OUTPATIENT
Start: 2022-10-15 | End: 2022-10-16

## 2022-10-15 RX ORDER — ASPIRIN/CALCIUM CARB/MAGNESIUM 324 MG
81 TABLET ORAL DAILY
Refills: 0 | Status: DISCONTINUED | OUTPATIENT
Start: 2022-10-15 | End: 2022-10-16

## 2022-10-15 RX ORDER — SODIUM CHLORIDE 0.65 %
1 AEROSOL, SPRAY (ML) NASAL THREE TIMES A DAY
Refills: 0 | Status: DISCONTINUED | OUTPATIENT
Start: 2022-10-15 | End: 2022-10-23

## 2022-10-15 RX ORDER — PANTOPRAZOLE SODIUM 20 MG/1
40 TABLET, DELAYED RELEASE ORAL
Refills: 0 | Status: DISCONTINUED | OUTPATIENT
Start: 2022-10-15 | End: 2022-10-23

## 2022-10-15 RX ORDER — ALBUTEROL 90 UG/1
2 AEROSOL, METERED ORAL EVERY 6 HOURS
Refills: 0 | Status: DISCONTINUED | OUTPATIENT
Start: 2022-10-15 | End: 2022-10-23

## 2022-10-15 RX ORDER — METOPROLOL TARTRATE 50 MG
1 TABLET ORAL
Qty: 0 | Refills: 0 | DISCHARGE

## 2022-10-15 RX ORDER — CHLORHEXIDINE GLUCONATE 213 G/1000ML
1 SOLUTION TOPICAL ONCE
Refills: 0 | Status: COMPLETED | OUTPATIENT
Start: 2022-10-15 | End: 2022-10-16

## 2022-10-15 RX ORDER — OMEPRAZOLE 10 MG/1
1 CAPSULE, DELAYED RELEASE ORAL
Qty: 0 | Refills: 0 | DISCHARGE

## 2022-10-15 RX ORDER — ISOSORBIDE MONONITRATE 60 MG/1
30 TABLET, EXTENDED RELEASE ORAL DAILY
Refills: 0 | Status: DISCONTINUED | OUTPATIENT
Start: 2022-10-15 | End: 2022-10-23

## 2022-10-15 RX ORDER — AMLODIPINE BESYLATE 2.5 MG/1
5 TABLET ORAL DAILY
Refills: 0 | Status: DISCONTINUED | OUTPATIENT
Start: 2022-10-15 | End: 2022-10-23

## 2022-10-15 RX ORDER — LANOLIN ALCOHOL/MO/W.PET/CERES
3 CREAM (GRAM) TOPICAL AT BEDTIME
Refills: 0 | Status: DISCONTINUED | OUTPATIENT
Start: 2022-10-15 | End: 2022-10-23

## 2022-10-15 RX ORDER — ERYTHROPOIETIN 10000 [IU]/ML
10000 INJECTION, SOLUTION INTRAVENOUS; SUBCUTANEOUS
Refills: 0 | Status: DISCONTINUED | OUTPATIENT
Start: 2022-10-15 | End: 2022-10-23

## 2022-10-15 RX ORDER — ONDANSETRON 8 MG/1
4 TABLET, FILM COATED ORAL EVERY 8 HOURS
Refills: 0 | Status: DISCONTINUED | OUTPATIENT
Start: 2022-10-15 | End: 2022-10-23

## 2022-10-15 RX ORDER — METOPROLOL TARTRATE 50 MG
50 TABLET ORAL DAILY
Refills: 0 | Status: DISCONTINUED | OUTPATIENT
Start: 2022-10-15 | End: 2022-10-23

## 2022-10-15 RX ORDER — ACETAMINOPHEN 500 MG
650 TABLET ORAL EVERY 6 HOURS
Refills: 0 | Status: DISCONTINUED | OUTPATIENT
Start: 2022-10-15 | End: 2022-10-23

## 2022-10-15 RX ORDER — FERRIC CITRATE 210 MG/1
1 TABLET, COATED ORAL
Qty: 0 | Refills: 0 | DISCHARGE

## 2022-10-15 RX ORDER — LOPERAMIDE HCL 2 MG
2 TABLET ORAL ONCE
Refills: 0 | Status: COMPLETED | OUTPATIENT
Start: 2022-10-15 | End: 2022-10-15

## 2022-10-15 RX ADMIN — Medication 50 MILLIGRAM(S): at 06:59

## 2022-10-15 RX ADMIN — Medication 2 MILLIGRAM(S): at 01:31

## 2022-10-15 RX ADMIN — PANTOPRAZOLE SODIUM 40 MILLIGRAM(S): 20 TABLET, DELAYED RELEASE ORAL at 06:59

## 2022-10-15 RX ADMIN — HEPARIN SODIUM 5000 UNIT(S): 5000 INJECTION INTRAVENOUS; SUBCUTANEOUS at 14:51

## 2022-10-15 RX ADMIN — ISOSORBIDE MONONITRATE 30 MILLIGRAM(S): 60 TABLET, EXTENDED RELEASE ORAL at 14:51

## 2022-10-15 RX ADMIN — Medication 81 MILLIGRAM(S): at 14:51

## 2022-10-15 RX ADMIN — Medication 650 MILLIGRAM(S): at 21:36

## 2022-10-15 RX ADMIN — Medication 975 MILLIGRAM(S): at 01:31

## 2022-10-15 RX ADMIN — AMLODIPINE BESYLATE 5 MILLIGRAM(S): 2.5 TABLET ORAL at 06:59

## 2022-10-15 RX ADMIN — Medication 100 MILLIGRAM(S): at 18:03

## 2022-10-15 RX ADMIN — ERYTHROPOIETIN 10000 UNIT(S): 10000 INJECTION, SOLUTION INTRAVENOUS; SUBCUTANEOUS at 22:16

## 2022-10-15 NOTE — H&P ADULT - NSHPLABSRESULTS_GEN_ALL_CORE
10-14    134<L>  |  94<L>  |  33<H>  ----------------------------<  76  4.8   |  27  |  6.67<H>    Ca    10.7<H>      14 Oct 2022 22:10  Mg     2.10     10-14    TPro  6.2  /  Alb  3.4  /  TBili  0.8  /  DBili  x   /  AST  34  /  ALT  15  /  AlkPhos  157<H>  10-14                        10.5   2.25  )-----------( 72       ( 14 Oct 2022 22:10 )             31.8     LIVER FUNCTIONS - ( 14 Oct 2022 22:10 )  Alb: 3.4 g/dL / Pro: 6.2 g/dL / ALK PHOS: 157 U/L / ALT: 15 U/L / AST: 34 U/L / GGT: x           EKG: N/A    Image: CXR IMPRESSION:    No focal consolidations.

## 2022-10-15 NOTE — H&P ADULT - PROBLEM SELECTOR PLAN 2
Unsure etiology. Does not drink ETOH per patient. Previous review of chart suggest he had thrombocytopenia in the past but not to this severity  -F/U with  PCP for most recent labs for comparison and recent workup  -HIV pending in AM

## 2022-10-15 NOTE — PHYSICAL THERAPY INITIAL EVALUATION ADULT - PERTINENT HX OF CURRENT PROBLEM, REHAB EVAL
56 yo M with h/o ESRD on T/Th/S dialysis 2/2 PKD, CAD s/p stents p/w 3 days of watery diarrhea up to 4x/day. He has been feeling weak because of that. He did get his dialysis on Friday however his nephrologist felt he was getting too weak and sent him into the hospital. He had COVID on Oct 5/6 with a cough and later tested positive at [facility].

## 2022-10-15 NOTE — PHYSICAL THERAPY INITIAL EVALUATION ADULT - ADDITIONAL COMMENTS
Pt reports he lives in a private home 7 steps to enter with his family.  Pt ambulates with a rolling walker at baseline.  Pt reports he performs all his ADL's on his own.  No history of falls.

## 2022-10-15 NOTE — H&P ADULT - HISTORY OF PRESENT ILLNESS
58 yo M with h/o ESRD on T/Th/S dialysis 2/2 PKD, CAD s/p stents p/w 3 days of watery diarrhea up to 4x/day. He has been feeling weak because of that. He did get his dialysis on Friday however his nephrologist felt he was getting too weak and sent him into the hospital. He had COVID on Oct 5/6 with a cough and later tested positive at [facility]. His symptoms progressed over the past few days and now having diarrhea. Denies CP, SOB, n/v/abdominal pain, dysuria, peripheral edema, fever/chills. No recent abx exposures.  In the ED, VSS. VSS, Wbc 2.2, Hgb 10 (at baseline). Plt 72. COVID. CXR without consolidations.

## 2022-10-15 NOTE — H&P ADULT - NSHPREVIEWOFSYSTEMS_GEN_ALL_CORE
REVIEW OF SYSTEMS:    CONSTITUTIONAL: ++weakness, but no fevers or chills  EYES/ENT: No visual changes;  No dysphagia  NECK: No pain or stiffness  RESPIRATORY: No cough, wheezing, hemoptysis; No shortness of breath  CARDIOVASCULAR: No chest pain or palpitations; No lower extremity edema  GASTROINTESTINAL: No abdominal or epigastric pain. No nausea, vomiting, or hematemesis; No diarrhea or constipation. No melena or hematochezia.  GENITOURINARY: No dysuria, frequency or hematuria  NEUROLOGICAL: No numbness or weakness  SKIN: No itching, burning, rashes, or lesions   PSYCH: No auditory or visual hallucinations  All other review of systems is negative unless indicated above.

## 2022-10-15 NOTE — CONSULT NOTE ADULT - SUBJECTIVE AND OBJECTIVE BOX
Patient is a 57y old  Male who presents with a chief complaint of Diarrhea (15 Oct 2022 06:34)      HPI:  56 yo M with h/o ESRD on T/Th/S dialysis 2/2 PKD, CAD s/p stents p/w 3 days of watery diarrhea up to 4x/day. He has been feeling weak because of that. He did get his dialysis on Friday however his nephrologist felt he was getting too weak and sent him into the hospital. He had COVID on Oct 5/6 with a cough and later tested positive at [facility]. His symptoms progressed over the past few days and now having diarrhea. Denies CP, SOB, n/v/abdominal pain, dysuria, peripheral edema, fever/chills. No recent abx exposures.  In the ED, VSS. VSS, Wbc 2.2, Hgb 10 (at baseline). Plt 72. COVID. CXR without consolidations. (15 Oct 2022 06:34)      PAST MEDICAL & SURGICAL HISTORY:  HTN (hypertension)      End stage renal disease on dialysis      Coronary artery disease      Polycystic kidney      Polycystic liver disease      Hepatitis C      AV fistula      Stented coronary artery      Coronary artery disease involving other coronary artery bypass graft          MEDICATIONS  (STANDING):  amLODIPine   Tablet 5 milliGRAM(s) Oral daily  aspirin enteric coated 81 milliGRAM(s) Oral daily  heparin   Injectable 5000 Unit(s) SubCutaneous every 8 hours  isosorbide   mononitrate ER Tablet (IMDUR) 30 milliGRAM(s) Oral daily  metoprolol succinate ER 50 milliGRAM(s) Oral daily  pantoprazole    Tablet 40 milliGRAM(s) Oral before breakfast      Allergies    No Known Allergies    Intolerances        SOCIAL HISTORY:  Denies ETOh,Smoking,     FAMILY HISTORY:  No pertinent family history in first degree relatives        REVIEW OF SYSTEMS:  CONSTITUTIONAL: No weakness, fevers or chills  EYES/ENT: No visual changes;  No vertigo or throat pain   NECK: No pain or stiffness  RESPIRATORY: No cough, wheezing, hemoptysis; No shortness of breath  CARDIOVASCULAR: No chest pain or palpitations  GASTROINTESTINAL: No abdominal or epigastric pain. No nausea, vomiting, or hematemesis; No diarrhea or constipation. No melena or hematochezia.  GENITOURINARY: No dysuria, frequency or hematuria  NEUROLOGICAL: No numbness or weakness  SKIN: No itching, burning, rashes, or lesions   All other review of systems is negative unless indicated above.    VITAL:  T(C): , Max: 36.9 (10-14-22 @ 17:39)  T(F): , Max: 98.4 (10-14-22 @ 17:39)  HR: 60 (10-15-22 @ 05:51)  BP: 114/71 (10-15-22 @ 05:51)  BP(mean): --  RR: 18 (10-15-22 @ 05:51)  SpO2: 100% (10-15-22 @ 05:51)  Wt(kg): --    PHYSICAL EXAM:  Constitutional: NAD, Alert  HEENT: NCAT, MMM  Neck: Supple, No JVD  Respiratory: CTA-b/l  Cardiovascular: RRR s1s2, no m/r/g  Gastrointestinal: BS+, soft, NT/ND  Extremities: No peripheral edema b/l  Neurological: no focal deficits; strength grossly intact  Back: no CVAT b/l  Skin: No rashes, no nevi    LABS:                        9.6    1.51  )-----------( 63       ( 15 Oct 2022 07:45 )             29.9     Na(134)/K(4.6)/Cl(94)/HCO3(24)/BUN(40)/Cr(7.55)Glu(68)/Ca(10.2)/Mg(--)/PO4(--)    10-15 @ 07:45  Na(134)/K(4.8)/Cl(94)/HCO3(27)/BUN(33)/Cr(6.67)Glu(76)/Ca(10.7)/Mg(2.10)/PO4(--)    10-14 @ 22:10            IMAGING:    ASSESSMENT:  56 yo M with h/o ESRD on T/Th/S dialysis 2/2 PKD, CAD s/p stents p/w 3 days of watery diarrhea up to 4x/day.        (1)Renal -  ESRD-HD - plan for HD today .   (2)CV - BP stable   (3)Anemia -    RECOMMENDATIONS  (1)HD  TTS--- HD  today  - 2kg UF as able - Retacrit with HD,   (2)        Thank you for involving [facility] in this patient's care.    With warm regards    Mouna Tapia  [facility]  Office: [phone]

## 2022-10-15 NOTE — PROGRESS NOTE ADULT - SUBJECTIVE AND OBJECTIVE BOX
Patient is a 57y old  Male who presents with a chief complaint of Diarrhea (15 Oct 2022 12:28)      HPI:  Diarrhea +  Cough +  COVID swab +      PAST MEDICAL & SURGICAL HISTORY:  HTN (hypertension)      End stage renal disease on dialysis      Coronary artery disease      Polycystic kidney      Polycystic liver disease      Hepatitis C      AV fistula      Stented coronary artery      Coronary artery disease involving other coronary artery bypass graft          Review of Systems:   CONSTITUTIONAL: No fever, weight loss, or fatigue  EYES: No eye pain, visual disturbances, or discharge  ENMT:  No difficulty hearing, tinnitus, vertigo; No sinus or throat pain  NECK: No pain or stiffness  BREASTS: No pain, masses, or nipple discharge  RESPIRATORY: No , wheezing, chills or hemoptysis; No shortness of breath  CARDIOVASCULAR: No chest pain, palpitations, dizziness, or leg swelling  GASTROINTESTINAL: No abdominal or epigastric pain. No nausea, vomiting, or hematemesis; No constipation. No melena or hematochezia.  GENITOURINARY: No dysuria, frequency, hematuria, or incontinence  NEUROLOGICAL: No headaches, memory loss, loss of strength, numbness, or tremors  SKIN: No itching, burning, rashes, or lesions   LYMPH NODES: No enlarged glands  ENDOCRINE: No heat or cold intolerance; No hair loss  MUSCULOSKELETAL: No joint pain or swelling; No muscle, back, or extremity pain  PSYCHIATRIC: No depression, anxiety, mood swings, or difficulty sleeping  HEME/LYMPH: No easy bruising, or bleeding gums  ALLERY AND IMMUNOLOGIC: No hives or eczema    Allergies    No Known Allergies    Intolerances        Social History:     FAMILY HISTORY:  No pertinent family history in first degree relatives        MEDICATIONS  (STANDING):  amLODIPine   Tablet 5 milliGRAM(s) Oral daily  aspirin enteric coated 81 milliGRAM(s) Oral daily  epoetin nydia-epbx (RETACRIT) Injectable 99996 Unit(s) IV Push <User Schedule>  heparin   Injectable 5000 Unit(s) SubCutaneous every 8 hours  isosorbide   mononitrate ER Tablet (IMDUR) 30 milliGRAM(s) Oral daily  metoprolol succinate ER 50 milliGRAM(s) Oral daily  pantoprazole    Tablet 40 milliGRAM(s) Oral before breakfast    MEDICATIONS  (PRN):  acetaminophen     Tablet .. 650 milliGRAM(s) Oral every 6 hours PRN Temp greater or equal to 38C (100.4F), Mild Pain (1 - 3)  ALBUTerol    90 MICROgram(s) HFA Inhaler 2 Puff(s) Inhalation every 6 hours PRN Bronchospasm  aluminum hydroxide/magnesium hydroxide/simethicone Suspension 30 milliLiter(s) Oral every 4 hours PRN Dyspepsia  guaiFENesin Oral Liquid (Sugar-Free) 100 milliGRAM(s) Oral every 6 hours PRN Cough  melatonin 3 milliGRAM(s) Oral at bedtime PRN Insomnia  ondansetron Injectable 4 milliGRAM(s) IV Push every 8 hours PRN Nausea and/or Vomiting  sodium chloride 0.65% Nasal 1 Spray(s) Both Nostrils three times a day PRN Nasal Congestion        CAPILLARY BLOOD GLUCOSE        I&O's Summary      PHYSICAL EXAM:  Vital Signs Last 24 Hrs  T(C): 36.4 (15 Oct 2022 05:51), Max: 36.9 (14 Oct 2022 17:39)  T(F): 97.5 (15 Oct 2022 05:51), Max: 98.4 (14 Oct 2022 17:39)  HR: 60 (15 Oct 2022 05:51) (60 - 67)  BP: 114/71 (15 Oct 2022 05:51) (114/71 - 157/79)  BP(mean): --  RR: 18 (15 Oct 2022 05:51) (16 - 20)  SpO2: 100% (15 Oct 2022 05:51) (99% - 100%)    Parameters below as of 15 Oct 2022 05:51  Patient On (Oxygen Delivery Method): room air        GENERAL: NAD, well-developed  HEAD:  Atraumatic, Normocephalic  EYES: EOMI, PERRLA, conjunctiva and sclera clear  NECK: Supple, No JVD  CHEST/LUNG: Clear to auscultation bilaterally; No wheeze  HEART: Regular rate and rhythm; No murmurs, rubs, or gallops  ABDOMEN: Soft, Nontender, Nondistended; Bowel sounds present  EXTREMITIES:  2+ Peripheral Pulses, No clubbing, cyanosis, or edema  PSYCH: AAOx3  NEUROLOGY: non-focal  SKIN: No rashes or lesions    LABS:                        9.6    1.51  )-----------( 63       ( 15 Oct 2022 07:45 )             29.9     10-15    134<L>  |  94<L>  |  40<H>  ----------------------------<  68<L>  4.6   |  24  |  7.55<H>    Ca    10.2      15 Oct 2022 07:45  Mg     2.10     10-14    TPro  5.8<L>  /  Alb  3.1<L>  /  TBili  0.8  /  DBili  x   /  AST  31  /  ALT  11  /  AlkPhos  136<H>  10-15              RADIOLOGY & ADDITIONAL TESTS:    Imaging Personally Reviewed:    Consultant(s) Notes Reviewed:      Care Discussed with Consultants/Other Providers:

## 2022-10-15 NOTE — H&P ADULT - ASSESSMENT
58 yo M with h/o ESRD on T/Th/S dialysis 2/2 PKD, CAD s/p stents p/w 3 days of watery diarrhea up to 4x/day now admitted for work up of acute diarrhea.

## 2022-10-15 NOTE — PATIENT PROFILE ADULT - FALL HARM RISK - HARM RISK INTERVENTIONS

## 2022-10-15 NOTE — PHYSICAL THERAPY INITIAL EVALUATION ADULT - GAIT PATTERN USED, PT EVAL
LE strength deficits noted.  Pt relying on UE on RW to ambulate.  Pt reporting this is his baseline/3-point gait

## 2022-10-16 LAB
ALBUMIN SERPL ELPH-MCNC: 3.2 G/DL — LOW (ref 3.3–5)
ALP SERPL-CCNC: 141 U/L — HIGH (ref 40–120)
ALT FLD-CCNC: 13 U/L — SIGNIFICANT CHANGE UP (ref 4–41)
ANION GAP SERPL CALC-SCNC: 12 MMOL/L — SIGNIFICANT CHANGE UP (ref 7–14)
AST SERPL-CCNC: 45 U/L — HIGH (ref 4–40)
BASOPHILS # BLD AUTO: 0 K/UL — SIGNIFICANT CHANGE UP (ref 0–0.2)
BASOPHILS NFR BLD AUTO: 0 % — SIGNIFICANT CHANGE UP (ref 0–2)
BILIRUB SERPL-MCNC: 1 MG/DL — SIGNIFICANT CHANGE UP (ref 0.2–1.2)
BUN SERPL-MCNC: 26 MG/DL — HIGH (ref 7–23)
CALCIUM SERPL-MCNC: 10.3 MG/DL — SIGNIFICANT CHANGE UP (ref 8.4–10.5)
CHLORIDE SERPL-SCNC: 94 MMOL/L — LOW (ref 98–107)
CO2 SERPL-SCNC: 28 MMOL/L — SIGNIFICANT CHANGE UP (ref 22–31)
CREAT SERPL-MCNC: 5.36 MG/DL — HIGH (ref 0.5–1.3)
D DIMER BLD IA.RAPID-MCNC: 279 NG/ML DDU — HIGH
EGFR: 12 ML/MIN/1.73M2 — LOW
EOSINOPHIL # BLD AUTO: 0 K/UL — SIGNIFICANT CHANGE UP (ref 0–0.5)
EOSINOPHIL NFR BLD AUTO: 0 % — SIGNIFICANT CHANGE UP (ref 0–6)
GLUCOSE SERPL-MCNC: 85 MG/DL — SIGNIFICANT CHANGE UP (ref 70–99)
HBV CORE AB SER-ACNC: SIGNIFICANT CHANGE UP
HBV SURFACE AB SER-ACNC: 9.2 MIU/ML — LOW
HBV SURFACE AG SER-ACNC: SIGNIFICANT CHANGE UP
HCT VFR BLD CALC: 30.9 % — LOW (ref 39–50)
HCV AB S/CO SERPL IA: 14.96 S/CO — HIGH (ref 0–0.99)
HCV AB SERPL-IMP: REACTIVE
HGB BLD-MCNC: 10.1 G/DL — LOW (ref 13–17)
IANC: 0.5 K/UL — LOW (ref 1.8–7.4)
IMM GRANULOCYTES NFR BLD AUTO: 1 % — HIGH (ref 0–0.9)
LACTATE SERPL-SCNC: 0.9 MMOL/L — SIGNIFICANT CHANGE UP (ref 0.5–2)
LYMPHOCYTES # BLD AUTO: 0.27 K/UL — LOW (ref 1–3.3)
LYMPHOCYTES # BLD AUTO: 27.6 % — SIGNIFICANT CHANGE UP (ref 13–44)
MAGNESIUM SERPL-MCNC: 1.9 MG/DL — SIGNIFICANT CHANGE UP (ref 1.6–2.6)
MCHC RBC-ENTMCNC: 31.2 PG — SIGNIFICANT CHANGE UP (ref 27–34)
MCHC RBC-ENTMCNC: 32.7 GM/DL — SIGNIFICANT CHANGE UP (ref 32–36)
MCV RBC AUTO: 95.4 FL — SIGNIFICANT CHANGE UP (ref 80–100)
MONOCYTES # BLD AUTO: 0.2 K/UL — SIGNIFICANT CHANGE UP (ref 0–0.9)
MONOCYTES NFR BLD AUTO: 20.4 % — HIGH (ref 2–14)
MRSA PCR RESULT.: SIGNIFICANT CHANGE UP
NEUTROPHILS # BLD AUTO: 0.5 K/UL — LOW (ref 1.8–7.4)
NEUTROPHILS NFR BLD AUTO: 51 % — SIGNIFICANT CHANGE UP (ref 43–77)
NRBC # BLD: 0 /100 WBCS — SIGNIFICANT CHANGE UP (ref 0–0)
NRBC # FLD: 0 K/UL — SIGNIFICANT CHANGE UP (ref 0–0)
PHOSPHATE SERPL-MCNC: 5.5 MG/DL — HIGH (ref 2.5–4.5)
PLATELET # BLD AUTO: 60 K/UL — LOW (ref 150–400)
POTASSIUM SERPL-MCNC: 4.1 MMOL/L — SIGNIFICANT CHANGE UP (ref 3.5–5.3)
POTASSIUM SERPL-SCNC: 4.1 MMOL/L — SIGNIFICANT CHANGE UP (ref 3.5–5.3)
PROCALCITONIN SERPL-MCNC: 1 NG/ML — HIGH (ref 0.02–0.1)
PROT SERPL-MCNC: 6 G/DL — SIGNIFICANT CHANGE UP (ref 6–8.3)
RBC # BLD: 3.24 M/UL — LOW (ref 4.2–5.8)
RBC # FLD: 13.5 % — SIGNIFICANT CHANGE UP (ref 10.3–14.5)
S AUREUS DNA NOSE QL NAA+PROBE: SIGNIFICANT CHANGE UP
SODIUM SERPL-SCNC: 134 MMOL/L — LOW (ref 135–145)
WBC # BLD: 0.98 K/UL — CRITICAL LOW (ref 3.8–10.5)
WBC # FLD AUTO: 0.98 K/UL — CRITICAL LOW (ref 3.8–10.5)

## 2022-10-16 PROCEDURE — 99222 1ST HOSP IP/OBS MODERATE 55: CPT | Mod: GC

## 2022-10-16 RX ORDER — REMDESIVIR 5 MG/ML
200 INJECTION INTRAVENOUS EVERY 24 HOURS
Refills: 0 | Status: COMPLETED | OUTPATIENT
Start: 2022-10-16 | End: 2022-10-16

## 2022-10-16 RX ORDER — REMDESIVIR 5 MG/ML
INJECTION INTRAVENOUS
Refills: 0 | Status: COMPLETED | OUTPATIENT
Start: 2022-10-16 | End: 2022-10-18

## 2022-10-16 RX ORDER — REMDESIVIR 5 MG/ML
100 INJECTION INTRAVENOUS EVERY 24 HOURS
Refills: 0 | Status: COMPLETED | OUTPATIENT
Start: 2022-10-17 | End: 2022-10-18

## 2022-10-16 RX ADMIN — Medication 100 MILLIGRAM(S): at 05:21

## 2022-10-16 RX ADMIN — Medication 650 MILLIGRAM(S): at 21:51

## 2022-10-16 RX ADMIN — CHLORHEXIDINE GLUCONATE 1 APPLICATION(S): 213 SOLUTION TOPICAL at 05:19

## 2022-10-16 RX ADMIN — ISOSORBIDE MONONITRATE 30 MILLIGRAM(S): 60 TABLET, EXTENDED RELEASE ORAL at 14:58

## 2022-10-16 RX ADMIN — HEPARIN SODIUM 5000 UNIT(S): 5000 INJECTION INTRAVENOUS; SUBCUTANEOUS at 05:21

## 2022-10-16 RX ADMIN — Medication 100 MILLIGRAM(S): at 21:51

## 2022-10-16 RX ADMIN — PANTOPRAZOLE SODIUM 40 MILLIGRAM(S): 20 TABLET, DELAYED RELEASE ORAL at 05:20

## 2022-10-16 RX ADMIN — AMLODIPINE BESYLATE 5 MILLIGRAM(S): 2.5 TABLET ORAL at 05:20

## 2022-10-16 RX ADMIN — Medication 650 MILLIGRAM(S): at 05:51

## 2022-10-16 RX ADMIN — REMDESIVIR 500 MILLIGRAM(S): 5 INJECTION INTRAVENOUS at 18:40

## 2022-10-16 RX ADMIN — Medication 650 MILLIGRAM(S): at 22:21

## 2022-10-16 RX ADMIN — Medication 50 MILLIGRAM(S): at 05:20

## 2022-10-16 RX ADMIN — Medication 650 MILLIGRAM(S): at 05:21

## 2022-10-16 RX ADMIN — Medication 3 MILLIGRAM(S): at 21:51

## 2022-10-16 NOTE — CONSULT NOTE ADULT - ATTENDING COMMENTS
d/w fellow. Reviewed chart, labs, images. COVID-related cytopenia. Supportive care. A/w above
56 yo man with ESRD on HD recent dry cough, tested covid + who developed diarrhea and referred to ER  reports cough and diarrhea have improved no BM today   hepC +  pancytopenia ?covid related ?hep C  suggestions above   will follow

## 2022-10-16 NOTE — CONSULT NOTE ADULT - ASSESSMENT
Mr. Lee is a 58 yo M with a PMH of ESRD on T/Th/S dialysis 2/2 PKD, CAD s/p stents p/w 3 days of watery diarrhea up to 4x/day. He has been feeling weak because of that. He did get his dialysis on Friday however his nephrologist felt he was getting too weak and sent him into the hospital. He had COVID on Oct 5 with a cough and later tested positive at [facility]. His symptoms progressed over the past few days and now having diarrhea. Denies CP, SOB, n/v/abdominal pain, dysuria, peripheral edema, fever/chills. No recent abx exposures.  In the ED, febrile to 101.6, Wbc 2.2, Hgb 10 (at baseline). Plt 72. COVID +. CXR without consolidations. ID consulted for the same.     WORKUP  CXR: CLear  RVP: COVID 19    DIAGNOSIS and IMPRESSION  ·	Fevers  ·	Pancytopenia  ·	COVID infection  ·	ESRD on HD    Febrile to 101.6 with downtrending WBC  Tested COVID 19 positive on 10/5  Symptoms: Diarrhea, Cough  Unvaccinated??     RECOMMENDATIONS        PT TO BE SEEN. PRELIM NOTE  PENDING RECS. PLEASE WAIT FOR FINAL RECS AFTER DISCUSSION WITH ATTENDINGViji Colon MD, PGY5  ID fellow  [employer] Teams Preferred  After 5pm/weekends call [phone]   Mr. Lee is a 58 yo M with a PMH of ESRD on T/Th/S dialysis 2/2 PKD, CAD s/p stents p/w 3 days of watery diarrhea up to 4x/day. He has been feeling weak because of that. He did get his dialysis on Friday however his nephrologist felt he was getting too weak and sent him into the hospital. He had COVID on Oct 5 with a cough and later tested positive at [facility]. His symptoms progressed over the past few days and now having diarrhea. Denies CP, SOB, n/v/abdominal pain, dysuria, peripheral edema, fever/chills. No recent abx exposures.  In the ED, febrile to 101.6, Wbc 2.2, Hgb 10 (at baseline). Plt 72. COVID +. CXR without consolidations. ID consulted for the same.     WORKUP  CXR: CLear  RVP: COVID 19    DIAGNOSIS and IMPRESSION  ·	Fevers  ·	Pancytopenia  ·	COVID infection  ·	ESRD on HD    Febrile to 101.6 with downtrending WBC  Tested COVID 19 positive on 10/5  Symptoms: Diarrhea, Cough  Unvaccinated  Diarrhea and pancytopenia can be see in COVID  Appears to be Hep C positive    RECOMMENDATIONS  Trend CBC with Diff  Recommend remdesevir  - Continue supplemental O2 and supportive care per primary team  - Continue Contact and Airborne Isolation precautions  - Recommend following Labs Q72hrs - ESR; CRP; Procalcitonin; Ferritin; LDH; d-dimer  - Recommend GI PCR      PT TO BE SEEN. PRELIM NOTE  PENDING RECS. PLEASE WAIT FOR FINAL RECS AFTER DISCUSSION WITH ATTENDINGViji Colon MD, PGY5  ID fellow  [employer] Teams Preferred  After 5pm/weekends call [phone]   Mr. Lee is a 58 yo M with a PMH of ESRD on T/Th/S dialysis 2/2 PKD, CAD s/p stents p/w 3 days of watery diarrhea up to 4x/day. He has been feeling weak because of that. He did get his dialysis on Friday however his nephrologist felt he was getting too weak and sent him into the hospital. He had COVID on Oct 5 with a cough and later tested positive at [facility]. His symptoms progressed over the past few days and now having diarrhea. Denies CP, SOB, n/v/abdominal pain, dysuria, peripheral edema, fever/chills. No recent abx exposures.  In the ED, febrile to 101.6, Wbc 2.2, Hgb 10 (at baseline). Plt 72. COVID +. CXR without consolidations. ID consulted for the same.     WORKUP  CXR: CLear  RVP: COVID 19    DIAGNOSIS and IMPRESSION  ·	Fevers  ·	Pancytopenia  ·	COVID infection  ·	ESRD on HD    Febrile to 101.6 with downtrending WBC  Tested COVID 19 positive on 10/5  Symptoms: Diarrhea, Cough  Unvaccinated  Diarrhea and pancytopenia can be see in COVID  Appears to be Hep C positive    RECOMMENDATIONS  Trend CBC with Diff  Recommend remdesevir  - Continue supplemental O2 and supportive care per primary team  - Continue Contact and Airborne Isolation precautions  - Recommend following Labs Q72hrs - ESR; CRP; Procalcitonin; Ferritin; LDH; d-dimer  - Recommend GI PCR  - Recommend Hep C RNA PCR    Pt seen and examined. Case d/w attending   Recommendation provided to primary team via Page.      Dav Colon MD, PGY5  ID fellow  [employer] Teams Preferred  After 5pm/weekends call [phone]

## 2022-10-16 NOTE — CONSULT NOTE ADULT - SUBJECTIVE AND OBJECTIVE BOX
HEMATOLOGY ONCOLOGY CONSULT     Patient is a 57y old  Male who presents with a chief complaint of Diarrhea (16 Oct 2022 13:45)      HPI:  58 yo M with h/o ESRD on T/Th/S dialysis 2/2 PKD, CAD s/p stents p/w 3 days of watery diarrhea up to 4x/day. He has been feeling weak because of that. He did get his dialysis on Friday however his nephrologist felt he was getting too weak and sent him into the hospital. He had COVID on Oct 5/6 with a cough and later tested positive at [facility]. His symptoms progressed over the past few days and now having diarrhea. Denies CP, SOB, n/v/abdominal pain, dysuria, peripheral edema, fever/chills. No recent abx exposures.  In the ED, VSS. VSS, Wbc 2.2, Hgb 10 (at baseline). Plt 72. COVID. CXR without consolidations. (15 Oct 2022 06:34)       ROS negative except as indicated in the HPI.    PAST MEDICAL & SURGICAL HISTORY:  HTN (hypertension)      End stage renal disease on dialysis      Coronary artery disease      Polycystic kidney      Polycystic liver disease      Hepatitis C      AV fistula      Stented coronary artery      Coronary artery disease involving other coronary artery bypass graft          SOCIAL HISTORY:    FAMILY HISTORY:  No pertinent family history in first degree relatives        MEDICATIONS  (STANDING):  amLODIPine   Tablet 5 milliGRAM(s) Oral daily  benzonatate 100 milliGRAM(s) Oral every 8 hours  epoetin nydia-epbx (RETACRIT) Injectable 48021 Unit(s) IV Push <User Schedule>  isosorbide   mononitrate ER Tablet (IMDUR) 30 milliGRAM(s) Oral daily  metoprolol succinate ER 50 milliGRAM(s) Oral daily  pantoprazole    Tablet 40 milliGRAM(s) Oral before breakfast  remdesivir  IVPB   IV Intermittent   remdesivir  IVPB 200 milliGRAM(s) IV Intermittent every 24 hours    MEDICATIONS  (PRN):  acetaminophen     Tablet .. 650 milliGRAM(s) Oral every 6 hours PRN Temp greater or equal to 38C (100.4F), Mild Pain (1 - 3)  ALBUTerol    90 MICROgram(s) HFA Inhaler 2 Puff(s) Inhalation every 6 hours PRN Bronchospasm  aluminum hydroxide/magnesium hydroxide/simethicone Suspension 30 milliLiter(s) Oral every 4 hours PRN Dyspepsia  guaiFENesin Oral Liquid (Sugar-Free) 100 milliGRAM(s) Oral every 6 hours PRN Cough  melatonin 3 milliGRAM(s) Oral at bedtime PRN Insomnia  ondansetron Injectable 4 milliGRAM(s) IV Push every 8 hours PRN Nausea and/or Vomiting  sodium chloride 0.65% Nasal 1 Spray(s) Both Nostrils three times a day PRN Nasal Congestion      Allergies    No Known Allergies    Intolerances        Vital Signs Last 24 Hrs  T(C): 37.4 (16 Oct 2022 05:51), Max: 38.7 (15 Oct 2022 21:22)  T(F): 99.3 (16 Oct 2022 05:51), Max: 101.6 (15 Oct 2022 21:22)  HR: 84 (16 Oct 2022 05:20) (80 - 84)  BP: 146/80 (16 Oct 2022 05:20) (146/80 - 155/89)  BP(mean): --  RR: 18 (16 Oct 2022 05:20) (18 - 20)  SpO2: 100% (16 Oct 2022 05:20) (100% - 100%)    Parameters below as of 16 Oct 2022 05:20  Patient On (Oxygen Delivery Method): room air        PHYSICAL EXAM  General: adult in NAD  HEENT: clear oropharynx, anicteric sclera, pink conjunctiva  Neck: supple  CV: normal S1/S2 with no murmur rubs or gallops  Lungs: positive air movement b/l ant lungs, clear to auscultation, no wheezes, no rales  Abdomen: soft non-tender non-distended, no hepatosplenomegaly  Ext: no clubbing cyanosis or edema  Skin: no rashes and no petechiae  Neuro: alert and oriented X 4, no focal deficits      10-15-22 @ 07:01  -  10-16-22 @ 07:00  --------------------------------------------------------  IN: 400 mL / OUT: 2000 mL / NET: -1600 mL      LABS:                          10.1   0.98  )-----------( 60       ( 16 Oct 2022 13:27 )             30.9         Mean Cell Volume : 95.4 fL  Mean Cell Hemoglobin : 31.2 pg  Mean Cell Hemoglobin Concentration : 32.7 gm/dL  Auto Neutrophil # : 0.50 K/uL  Auto Lymphocyte # : 0.27 K/uL  Auto Monocyte # : 0.20 K/uL  Auto Eosinophil # : 0.00 K/uL  Auto Basophil # : 0.00 K/uL  Auto Neutrophil % : 51.0 %  Auto Lymphocyte % : 27.6 %  Auto Monocyte % : 20.4 %  Auto Eosinophil % : 0.0 %  Auto Basophil % : 0.0 %      10-16    134<L>  |  94<L>  |  26<H>  ----------------------------<  85  4.1   |  28  |  5.36<H>    Ca    10.3      16 Oct 2022 13:27  Phos  5.5     10-16  Mg     1.90     10-16    TPro  6.0  /  Alb  3.2<L>  /  TBili  1.0  /  DBili  x   /  AST  45<H>  /  ALT  13  /  AlkPhos  141<H>  10-16

## 2022-10-16 NOTE — PROGRESS NOTE ADULT - SUBJECTIVE AND OBJECTIVE BOX
Patient is a 57y old  Male who presents with a chief complaint of Diarrhea (15 Oct 2022 12:28)      HPI:  Diarrhea +  Cough +  COVID swab +      PAST MEDICAL & SURGICAL HISTORY:  HTN (hypertension)      End stage renal disease on dialysis      Coronary artery disease      Polycystic kidney      Polycystic liver disease      Hepatitis C      AV fistula      Stented coronary artery      Coronary artery disease involving other coronary artery bypass graft          Review of Systems:   CONSTITUTIONAL: No fever, weight loss, or fatigue  EYES: No eye pain, visual disturbances, or discharge  ENMT:  No difficulty hearing, tinnitus, vertigo; No sinus or throat pain  NECK: No pain or stiffness  BREASTS: No pain, masses, or nipple discharge  RESPIRATORY: No , wheezing, chills or hemoptysis; No shortness of breath  CARDIOVASCULAR: No chest pain, palpitations, dizziness, or leg swelling  GASTROINTESTINAL: No abdominal or epigastric pain. No nausea, vomiting, or hematemesis; No constipation. No melena or hematochezia.  GENITOURINARY: No dysuria, frequency, hematuria, or incontinence  NEUROLOGICAL: No headaches, memory loss, loss of strength, numbness, or tremors  SKIN: No itching, burning, rashes, or lesions   LYMPH NODES: No enlarged glands  ENDOCRINE: No heat or cold intolerance; No hair loss  MUSCULOSKELETAL: No joint pain or swelling; No muscle, back, or extremity pain  PSYCHIATRIC: No depression, anxiety, mood swings, or difficulty sleeping  HEME/LYMPH: No easy bruising, or bleeding gums  ALLERY AND IMMUNOLOGIC: No hives or eczema    Allergies    No Known Allergies    Intolerances        Social History:     FAMILY HISTORY:  No pertinent family history in first degree relatives    MEDICATIONS  (STANDING):  amLODIPine   Tablet 5 milliGRAM(s) Oral daily  benzonatate 100 milliGRAM(s) Oral every 8 hours  epoetin nydia-epbx (RETACRIT) Injectable 33012 Unit(s) IV Push <User Schedule>  isosorbide   mononitrate ER Tablet (IMDUR) 30 milliGRAM(s) Oral daily  metoprolol succinate ER 50 milliGRAM(s) Oral daily  pantoprazole    Tablet 40 milliGRAM(s) Oral before breakfast  remdesivir  IVPB   IV Intermittent     MEDICATIONS  (PRN):  acetaminophen     Tablet .. 650 milliGRAM(s) Oral every 6 hours PRN Temp greater or equal to 38C (100.4F), Mild Pain (1 - 3)  ALBUTerol    90 MICROgram(s) HFA Inhaler 2 Puff(s) Inhalation every 6 hours PRN Bronchospasm  aluminum hydroxide/magnesium hydroxide/simethicone Suspension 30 milliLiter(s) Oral every 4 hours PRN Dyspepsia  guaiFENesin Oral Liquid (Sugar-Free) 100 milliGRAM(s) Oral every 6 hours PRN Cough  melatonin 3 milliGRAM(s) Oral at bedtime PRN Insomnia  ondansetron Injectable 4 milliGRAM(s) IV Push every 8 hours PRN Nausea and/or Vomiting  sodium chloride 0.65% Nasal 1 Spray(s) Both Nostrils three times a day PRN Nasal Congestion      Vital Signs Last 24 Hrs  T(C): 37.2 (10-16-22 @ 14:46), Max: 38.1 (10-16-22 @ 05:20)  T(F): 98.9 (10-16-22 @ 14:46), Max: 100.6 (10-16-22 @ 05:20)  HR: 81 (10-16-22 @ 14:46) (81 - 84)  BP: 137/65 (10-16-22 @ 14:46) (137/65 - 155/89)  BP(mean): --  RR: 17 (10-16-22 @ 14:46) (17 - 20)  SpO2: 99% (10-16-22 @ 14:46) (99% - 100%)          GENERAL: NAD, well-developed  HEAD:  Atraumatic, Normocephalic  EYES: EOMI, PERRLA, conjunctiva and sclera clear  NECK: Supple, No JVD  CHEST/LUNG: dec breath sounds at bases  HEART: Regular rate and rhythm; No murmurs, rubs, or gallops  ABDOMEN: Soft, Nontender, Nondistended; Bowel sounds present  EXTREMITIES:  2+ Peripheral Pulses, No clubbing, cyanosis, or edema  PSYCH: Alert awake  NEUROLOGY: non-focal  SKIN: No rashes or lesions    LABS:  10-16    134<L>  |  94<L>  |  26<H>  ----------------------------<  85  4.1   |  28  |  5.36<H>    Ca    10.3      16 Oct 2022 13:27  Phos  5.5     10-16  Mg     1.90     10-16    TPro  6.0  /  Alb  3.2<L>  /  TBili  1.0  /  DBili      /  AST  45<H>  /  ALT  13  /  AlkPhos  141<H>  10-16    Creatinine Trend: 5.36 <--, 7.55 <--, 6.67 <--                        10.1   0.98  )-----------( 60       ( 16 Oct 2022 13:27 )             30.9     Urine Studies:            LIVER FUNCTIONS - ( 16 Oct 2022 13:27 )  Alb: 3.2 g/dL / Pro: 6.0 g/dL / ALK PHOS: 141 U/L / ALT: 13 U/L / AST: 45 U/L / GGT: x                 RADIOLOGY & ADDITIONAL TESTS:    Imaging Personally Reviewed:    Consultant(s) Notes Reviewed:  yes    Care Discussed with Consultants/Other Providers:yes

## 2022-10-16 NOTE — PROGRESS NOTE ADULT - ASSESSMENT
56 yo M with h/o ESRD on T/Th/S dialysis 2/2 PKD, CAD s/p stents p/w 3 days of watery diarrhea up to 4x/day now admitted for work up of acute diarrhea.

## 2022-10-16 NOTE — CONSULT NOTE ADULT - ASSESSMENT
Mr. Lee is a 57M with a past medical history of ESRD on HD secondary to ADPKD, HepC presents due to watery diarrhea for the last 4-5 days. Tested positive for COVID, although not endorsing any respiratory complaints. Hematology consulted due to dropping leukocyte count and platelets.        # Pancytopenia in the setting of COVID-19 infection   # Neutropenia with fevers   - Baseline WBC is around 3.0-3.9 per [employer] chart review. Platelets previously normal. Hemoglobin is low as well, but has baseline anemia (likely ACI).   - Pancytopenia likely in the setting of acute viral illness. No bleeding, cough, abdominal pain, easy bruising.   - Given neutropenic status, fevers should be treated and monitored for aggressively. Fever could be from COVID-19 itself, but cannot exclude underlying bacterial superinfection  - Tylenol for fevers. Infectious work-up: procal elevated, Hep C positive, HIV neg, CXR without consolidations, no UA   - Smear ordered, will review in AM   - CBC with DIFF daily, Send also reticulocyte count, LDH, haptoglobin, iron studies, ferritin, vitamin B12, folate, SPEP/UPEP, coags       Zbigniew Valdez MD, PGY-4  Hematology/Medical Oncology Fellow  Pager: [phone]  Available on Microsoft Teams  After 5pm or on weekends please contact  to page on-call fellow    Mr. Lee is a 57M with a past medical history of ESRD on HD secondary to ADPKD, HepC presents due to watery diarrhea for the last 4-5 days. Tested positive for COVID, although not endorsing any respiratory complaints. Hematology consulted due to dropping leukocyte count and platelets.        # Pancytopenia in the setting of COVID-19 infection   # Neutropenia with fevers   - Baseline WBC is around 3.0-3.9 per [employer] chart review. Platelets previously normal. Hemoglobin is low as well, but has baseline anemia (likely ACI).   - Pancytopenia likely in the setting of acute viral illness. No bleeding, abdominal pain, easy bruising.   - Given neutropenic status, fevers should be treated and monitored for aggressively. Fever could be from COVID-19 itself, but cannot exclude underlying bacterial superinfection  - Tylenol for fevers. Infectious work-up: procal elevated, Hep C positive, HIV neg, CXR without consolidations, no UA   - Appreciate ID consult   - Smear ordered, will review in AM   - CBC with DIFF daily, Send also reticulocyte count, LDH, haptoglobin, iron studies, ferritin, vitamin B12, folate, SPEP/UPEP, coags       Zbigniew Valdez MD, PGY-4  Hematology/Medical Oncology Fellow  Pager: [phone]  Available on Microsoft Teams  After 5pm or on weekends please contact  to page on-call fellow

## 2022-10-16 NOTE — CONSULT NOTE ADULT - SUBJECTIVE AND OBJECTIVE BOX
Patient is a 57y old  Male who presents with a chief complaint of Diarrhea (15 Oct 2022 16:53)    HPI: Mr. Lee is a 58 yo M with a PMH of ESRD on T/Th/S dialysis 2/2 PKD, CAD s/p stents p/w 3 days of watery diarrhea up to 4x/day. He has been feeling weak because of that. He did get his dialysis on Friday however his nephrologist felt he was getting too weak and sent him into the hospital. He had COVID on Oct 5 with a cough and later tested positive at [facility]. His symptoms progressed over the past few days and now having diarrhea. Denies CP, SOB, n/v/abdominal pain, dysuria, peripheral edema, fever/chills. No recent abx exposures.  In the ED, febrile to 101.6, Wbc 2.2, Hgb 10 (at baseline). Plt 72. COVID +. CXR without consolidations. ID consulted for the same.        REVIEW OF SYSTEMS  [  ] ROS unobtainable because:    [ x ] All other systems negative except as noted below    Constitutional:  [ ] fever [ ] chills  [ ] weight loss  [ ]night sweat  [ ]poor appetite/PO intake [ ]fatigue   Skin:  [ ] rash [ ] phlebitis	  Eyes: [ ] icterus [ ] pain  [ ] discharge	  ENMT: [ ] sore throat  [ ] thrush [ ] ulcers [ ] exudates [ ]anosmia  Respiratory: [ ] dyspnea [ ] hemoptysis [ ] cough [ ] sputum	  Cardiovascular:  [ ] chest pain [ ] palpitations [ ] edema	  Gastrointestinal:  [ ] nausea [ ] vomiting [ ] diarrhea [ ] constipation [ ] pain	  Genitourinary:  [ ] dysuria [ ] frequency [ ] hematuria [ ] discharge [ ] flank pain  [ ] incontinence  Musculoskeletal:  [ ] myalgias [ ] arthralgias [ ] arthritis  [ ] back pain  Neurological:  [ ] headache [ ] weakness [ ] seizures  [ ] confusion/altered mental status    prior hospital charts reviewed [V]  primary team notes reviewed [V]  other consultant notes reviewed [V]    PAST MEDICAL & SURGICAL HISTORY:  HTN (hypertension)  End stage renal disease on dialysis  Coronary artery disease  Polycystic kidney  Polycystic liver disease  Hepatitis C  AV fistula  Stented coronary artery  Coronary artery disease involving other coronary artery bypass graft    SOCIAL HISTORY:  - Denied smoking/vaping/alcohol/recreational drug use    FAMILY HISTORY:  No pertinent family history in first degree relatives        Allergies  No Known Allergies        ANTIMICROBIALS:      ANTIMICROBIALS (past 90 days):  MEDICATIONS  (STANDING):        OTHER MEDS:   MEDICATIONS  (STANDING):  acetaminophen     Tablet .. 650 every 6 hours PRN  ALBUTerol    90 MICROgram(s) HFA Inhaler 2 every 6 hours PRN  aluminum hydroxide/magnesium hydroxide/simethicone Suspension 30 every 4 hours PRN  amLODIPine   Tablet 5 daily  epoetin nydia-epbx (RETACRIT) Injectable 65318 <User Schedule>  guaiFENesin Oral Liquid (Sugar-Free) 100 every 6 hours PRN  isosorbide   mononitrate ER Tablet (IMDUR) 30 daily  melatonin 3 at bedtime PRN  metoprolol succinate ER 50 daily  ondansetron Injectable 4 every 8 hours PRN  pantoprazole    Tablet 40 before breakfast      VITALS:  Vital Signs Last 24 Hrs  T(F): 99.3 (10-16-22 @ 05:51), Max: 101.6 (10-15-22 @ 21:22)    Vital Signs Last 24 Hrs  HR: 84 (10-16-22 @ 05:20) (80 - 84)  BP: 146/80 (10-16-22 @ 05:20) (119/79 - 155/89)  RR: 18 (10-16-22 @ 05:20)  SpO2: 100% (10-16-22 @ 05:20) (100% - 100%)  Wt(kg): --    EXAM:    GA: NAD, AOx3  HEENT: oral cavity no lesion  CV: nl S1/S2, no RMG  Lungs: CTAB, No distress  Abd: BS+, soft, nontender, no rebounding pain  Ext: no edema  Neuro: No focal deficits  Skin: Intact  IV: no phlebitis    Labs:                        9.6    1.51  )-----------( 63       ( 15 Oct 2022 07:45 )             29.9     10-15    134<L>  |  94<L>  |  40<H>  ----------------------------<  68<L>  4.6   |  24  |  7.55<H>    Ca    10.2      15 Oct 2022 07:45  Mg     2.10     10-14    TPro  5.8<L>  /  Alb  3.1<L>  /  TBili  0.8  /  DBili  x   /  AST  31  /  ALT  11  /  AlkPhos  136<H>  10-15      WBC Trend:  WBC Count: 1.51 (10-15-22 @ 07:45)  WBC Count: 2.25 (10-14-22 @ 22:10)      Auto Neutrophil #: 1.45 K/uL (10-14-22 @ 22:10)      Creatine Trend:  Creatinine, Serum: 7.55 (10-15)  Creatinine, Serum: 6.67 (10-14)      Liver Biochemical Testing Trend:  Alanine Aminotransferase (ALT/SGPT): 11 (10-15)  Alanine Aminotransferase (ALT/SGPT): 15 (10-14)  Aspartate Aminotransferase (AST/SGOT): 31 (10-15-22 @ 07:45)  Aspartate Aminotransferase (AST/SGOT): 34 (10-14-22 @ 22:10)  Bilirubin Total, Serum: 0.8 (10-15)  Bilirubin Total, Serum: 0.8 (10-14)      Trend LDH      Auto Eosinophil %: 0.0 % (10-14-22 @ 22:10)          MICROBIOLOGY:    HIV-1/2 Combo Result: Nonreact (10-15-22 @ 07:45)  RADIOLOGY:  imaging below personally reviewed    < from: Xray Chest 1 View- PORTABLE-Urgent (Xray Chest 1 View- PORTABLE-Urgent .) (10.15.22 @ 01:52) >  No focal consolidations.    < end of copied text >   Patient is a 57y old  Male who presents with a chief complaint of Diarrhea (15 Oct 2022 16:53)    HPI: Mr. Lee is a 58 yo M with a PMH of ESRD on T/Th/S dialysis 2/2 PKD, CAD s/p stents p/w 3 days of watery diarrhea up to 4x/day. He has been feeling weak because of that. He did get his dialysis on Friday however his nephrologist felt he was getting too weak and sent him into the hospital. He had COVID on Oct 5 with a cough and later tested positive at [facility]. His symptoms progressed over the past few days and now having diarrhea. Denies CP, SOB, n/v/abdominal pain, dysuria, peripheral edema, fever/chills. No recent abx exposures.  In the ED, febrile to 101.6, Wbc 2.2, Hgb 10 (at baseline). Plt 72. COVID +. CXR without consolidations. ID consulted for the same.        REVIEW OF SYSTEMS  [  ] ROS unobtainable because:    [ x ] All other systems negative except as noted below    Constitutional:  [ ] fever [ ] chills  [ ] weight loss  [ ]night sweat  [ ]poor appetite/PO intake [ ]fatigue   Skin:  [ ] rash [ ] phlebitis	  Eyes: [ ] icterus [ ] pain  [ ] discharge	  ENMT: [ ] sore throat  [ ] thrush [ ] ulcers [ ] exudates [ ]anosmia  Respiratory: [ ] dyspnea [ ] hemoptysis [ ] cough [ ] sputum	  Cardiovascular:  [ ] chest pain [ ] palpitations [ ] edema	  Gastrointestinal:  [ ] nausea [ ] vomiting [ ] diarrhea [ ] constipation [ ] pain	  Genitourinary:  [ ] dysuria [ ] frequency [ ] hematuria [ ] discharge [ ] flank pain  [ ] incontinence  Musculoskeletal:  [ ] myalgias [ ] arthralgias [ ] arthritis  [ ] back pain  Neurological:  [ ] headache [ ] weakness [ ] seizures  [ ] confusion/altered mental status    prior hospital charts reviewed [V]  primary team notes reviewed [V]  other consultant notes reviewed [V]    PAST MEDICAL & SURGICAL HISTORY:  HTN (hypertension)  End stage renal disease on dialysis  Coronary artery disease  Polycystic kidney  Polycystic liver disease  Hepatitis C  AV fistula  Stented coronary artery  Coronary artery disease involving other coronary artery bypass graft    SOCIAL HISTORY:  - Denied smoking/vaping/alcohol/recreational drug use    FAMILY HISTORY:  No pertinent family history in first degree relatives        Allergies  No Known Allergies        ANTIMICROBIALS:      ANTIMICROBIALS (past 90 days):  MEDICATIONS  (STANDING):        OTHER MEDS:   MEDICATIONS  (STANDING):  acetaminophen     Tablet .. 650 every 6 hours PRN  ALBUTerol    90 MICROgram(s) HFA Inhaler 2 every 6 hours PRN  aluminum hydroxide/magnesium hydroxide/simethicone Suspension 30 every 4 hours PRN  amLODIPine   Tablet 5 daily  epoetin nydia-epbx (RETACRIT) Injectable 40202 <User Schedule>  guaiFENesin Oral Liquid (Sugar-Free) 100 every 6 hours PRN  isosorbide   mononitrate ER Tablet (IMDUR) 30 daily  melatonin 3 at bedtime PRN  metoprolol succinate ER 50 daily  ondansetron Injectable 4 every 8 hours PRN  pantoprazole    Tablet 40 before breakfast      VITALS:  Vital Signs Last 24 Hrs  T(F): 99.3 (10-16-22 @ 05:51), Max: 101.6 (10-15-22 @ 21:22)    Vital Signs Last 24 Hrs  HR: 84 (10-16-22 @ 05:20) (80 - 84)  BP: 146/80 (10-16-22 @ 05:20) (119/79 - 155/89)  RR: 18 (10-16-22 @ 05:20)  SpO2: 100% (10-16-22 @ 05:20) (100% - 100%)  Wt(kg): --    EXAM:    Constitutional: Not in acute distress  Eyes: pupils bilaterally reactive to light. No icterus.  Oral cavity: Clear, no lesions  Neck: No neck vein distension noted  RS: Chest clear to auscultation bilaterally. No wheeze/rhonchi/crepitations.  CVS: S1, S2 heard. Regular rate and rhythm. No murmurs/rubs/gallops.  Abdomen: Soft. No guarding/rigidity/tenderness.  : No acute abnormalities  Extremities: Warm. No pedal edema  Skin: No lesions noted  Vascular: No evidence of phlebitis  Neuro: Alert, oriented to time/place/person  Cranial nerves 2-12 grossly normal. No focal abnormalities    Labs:                        9.6    1.51  )-----------( 63       ( 15 Oct 2022 07:45 )             29.9     10-15    134<L>  |  94<L>  |  40<H>  ----------------------------<  68<L>  4.6   |  24  |  7.55<H>    Ca    10.2      15 Oct 2022 07:45  Mg     2.10     10-14    TPro  5.8<L>  /  Alb  3.1<L>  /  TBili  0.8  /  DBili  x   /  AST  31  /  ALT  11  /  AlkPhos  136<H>  10-15      WBC Trend:  WBC Count: 1.51 (10-15-22 @ 07:45)  WBC Count: 2.25 (10-14-22 @ 22:10)      Auto Neutrophil #: 1.45 K/uL (10-14-22 @ 22:10)      Creatine Trend:  Creatinine, Serum: 7.55 (10-15)  Creatinine, Serum: 6.67 (10-14)      Liver Biochemical Testing Trend:  Alanine Aminotransferase (ALT/SGPT): 11 (10-15)  Alanine Aminotransferase (ALT/SGPT): 15 (10-14)  Aspartate Aminotransferase (AST/SGOT): 31 (10-15-22 @ 07:45)  Aspartate Aminotransferase (AST/SGOT): 34 (10-14-22 @ 22:10)  Bilirubin Total, Serum: 0.8 (10-15)  Bilirubin Total, Serum: 0.8 (10-14)      Trend LDH      Auto Eosinophil %: 0.0 % (10-14-22 @ 22:10)          MICROBIOLOGY:    HIV-1/2 Combo Result: Nonreact (10-15-22 @ 07:45)  RADIOLOGY:  imaging below personally reviewed    < from: Xray Chest 1 View- PORTABLE-Urgent (Xray Chest 1 View- PORTABLE-Urgent .) (10.15.22 @ 01:52) >  No focal consolidations.    < end of copied text >

## 2022-10-17 LAB
ALBUMIN SERPL ELPH-MCNC: 3.4 G/DL — SIGNIFICANT CHANGE UP (ref 3.3–5)
ALP SERPL-CCNC: 147 U/L — HIGH (ref 40–120)
ALT FLD-CCNC: 19 U/L — SIGNIFICANT CHANGE UP (ref 4–41)
ANION GAP SERPL CALC-SCNC: 15 MMOL/L — HIGH (ref 7–14)
APTT BLD: 30.8 SEC — SIGNIFICANT CHANGE UP (ref 27–36.3)
AST SERPL-CCNC: 66 U/L — HIGH (ref 4–40)
BASOPHILS # BLD AUTO: 0 K/UL — SIGNIFICANT CHANGE UP (ref 0–0.2)
BASOPHILS NFR BLD AUTO: 0 % — SIGNIFICANT CHANGE UP (ref 0–2)
BILIRUB SERPL-MCNC: 0.8 MG/DL — SIGNIFICANT CHANGE UP (ref 0.2–1.2)
BUN SERPL-MCNC: 33 MG/DL — HIGH (ref 7–23)
CALCIUM SERPL-MCNC: 10.5 MG/DL — SIGNIFICANT CHANGE UP (ref 8.4–10.5)
CHLORIDE SERPL-SCNC: 94 MMOL/L — LOW (ref 98–107)
CO2 SERPL-SCNC: 25 MMOL/L — SIGNIFICANT CHANGE UP (ref 22–31)
CREAT SERPL-MCNC: 6.43 MG/DL — HIGH (ref 0.5–1.3)
CRP SERPL-MCNC: 25.8 MG/L — HIGH
EGFR: 9 ML/MIN/1.73M2 — LOW
EOSINOPHIL # BLD AUTO: 0 K/UL — SIGNIFICANT CHANGE UP (ref 0–0.5)
EOSINOPHIL NFR BLD AUTO: 0 % — SIGNIFICANT CHANGE UP (ref 0–6)
ERYTHROCYTE [SEDIMENTATION RATE] IN BLOOD: 25 MM/HR — HIGH (ref 1–15)
FERRITIN SERPL-MCNC: 2437 NG/ML — HIGH (ref 30–400)
FERRITIN SERPL-MCNC: 2532 NG/ML — HIGH (ref 30–400)
FOLATE SERPL-MCNC: 16.3 NG/ML — SIGNIFICANT CHANGE UP (ref 3.1–17.5)
GLUCOSE SERPL-MCNC: 89 MG/DL — SIGNIFICANT CHANGE UP (ref 70–99)
HAPTOGLOB SERPL-MCNC: 82 MG/DL — SIGNIFICANT CHANGE UP (ref 34–200)
HCT VFR BLD CALC: 33 % — LOW (ref 39–50)
HCV RNA FLD QL NAA+PROBE: SIGNIFICANT CHANGE UP
HCV RNA SPEC NAA+PROBE-LOG IU: SIGNIFICANT CHANGE UP
HCV RNA SPEC NAA+PROBE-LOG IU: SIGNIFICANT CHANGE UP LOGIU/ML
HCV RNA SPEC QL PROBE+SIG AMP: SIGNIFICANT CHANGE UP
HGB BLD-MCNC: 10.4 G/DL — LOW (ref 13–17)
IANC: 0.53 K/UL — LOW (ref 1.8–7.4)
IMM GRANULOCYTES NFR BLD AUTO: 0.9 % — SIGNIFICANT CHANGE UP (ref 0–0.9)
INR BLD: 1.14 RATIO — SIGNIFICANT CHANGE UP (ref 0.88–1.16)
IRON SATN MFR SERPL: 29 UG/DL — LOW (ref 45–165)
LDH SERPL L TO P-CCNC: 297 U/L — HIGH (ref 135–225)
LYMPHOCYTES # BLD AUTO: 0.41 K/UL — LOW (ref 1–3.3)
LYMPHOCYTES # BLD AUTO: 37.6 % — SIGNIFICANT CHANGE UP (ref 13–44)
MCHC RBC-ENTMCNC: 31 PG — SIGNIFICANT CHANGE UP (ref 27–34)
MCHC RBC-ENTMCNC: 31.5 GM/DL — LOW (ref 32–36)
MCV RBC AUTO: 98.2 FL — SIGNIFICANT CHANGE UP (ref 80–100)
MONOCYTES # BLD AUTO: 0.14 K/UL — SIGNIFICANT CHANGE UP (ref 0–0.9)
MONOCYTES NFR BLD AUTO: 12.8 % — SIGNIFICANT CHANGE UP (ref 2–14)
NEUTROPHILS # BLD AUTO: 0.53 K/UL — LOW (ref 1.8–7.4)
NEUTROPHILS NFR BLD AUTO: 48.7 % — SIGNIFICANT CHANGE UP (ref 43–77)
NRBC # BLD: 0 /100 WBCS — SIGNIFICANT CHANGE UP (ref 0–0)
NRBC # FLD: 0 K/UL — SIGNIFICANT CHANGE UP (ref 0–0)
PLATELET # BLD AUTO: 45 K/UL — LOW (ref 150–400)
POTASSIUM SERPL-MCNC: 5.2 MMOL/L — SIGNIFICANT CHANGE UP (ref 3.5–5.3)
POTASSIUM SERPL-SCNC: 5.2 MMOL/L — SIGNIFICANT CHANGE UP (ref 3.5–5.3)
PROT ?TM UR-MCNC: 221 MG/DL — SIGNIFICANT CHANGE UP
PROT SERPL-MCNC: 6.1 G/DL — SIGNIFICANT CHANGE UP (ref 6–8.3)
PROT SERPL-MCNC: 6.5 G/DL — SIGNIFICANT CHANGE UP (ref 6–8.3)
PROTHROM AB SERPL-ACNC: 13.3 SEC — SIGNIFICANT CHANGE UP (ref 10.5–13.4)
RBC # BLD: 3.33 M/UL — LOW (ref 4.2–5.8)
RBC # BLD: 3.36 M/UL — LOW (ref 4.2–5.8)
RBC # FLD: 13.5 % — SIGNIFICANT CHANGE UP (ref 10.3–14.5)
RETICS #: 48.6 K/UL — SIGNIFICANT CHANGE UP (ref 25–125)
RETICS/RBC NFR: 1.5 % — SIGNIFICANT CHANGE UP (ref 0.5–2.5)
SODIUM SERPL-SCNC: 134 MMOL/L — LOW (ref 135–145)
VIT B12 SERPL-MCNC: 271 PG/ML — SIGNIFICANT CHANGE UP (ref 200–900)
WBC # BLD: 1.09 K/UL — LOW (ref 3.8–10.5)
WBC # FLD AUTO: 1.09 K/UL — LOW (ref 3.8–10.5)

## 2022-10-17 PROCEDURE — 84165 PROTEIN E-PHORESIS SERUM: CPT | Mod: 26

## 2022-10-17 PROCEDURE — 84166 PROTEIN E-PHORESIS/URINE/CSF: CPT | Mod: 26

## 2022-10-17 PROCEDURE — 86335 IMMUNFIX E-PHORSIS/URINE/CSF: CPT | Mod: 26

## 2022-10-17 RX ORDER — PREGABALIN 225 MG/1
1000 CAPSULE ORAL DAILY
Refills: 0 | Status: DISCONTINUED | OUTPATIENT
Start: 2022-10-22 | End: 2022-10-23

## 2022-10-17 RX ORDER — PREGABALIN 225 MG/1
1000 CAPSULE ORAL DAILY
Refills: 0 | Status: COMPLETED | OUTPATIENT
Start: 2022-10-17 | End: 2022-10-22

## 2022-10-17 RX ADMIN — Medication 100 MILLIGRAM(S): at 21:33

## 2022-10-17 RX ADMIN — ISOSORBIDE MONONITRATE 30 MILLIGRAM(S): 60 TABLET, EXTENDED RELEASE ORAL at 13:37

## 2022-10-17 RX ADMIN — Medication 650 MILLIGRAM(S): at 16:39

## 2022-10-17 RX ADMIN — Medication 650 MILLIGRAM(S): at 15:39

## 2022-10-17 RX ADMIN — REMDESIVIR 500 MILLIGRAM(S): 5 INJECTION INTRAVENOUS at 17:51

## 2022-10-17 RX ADMIN — Medication 100 MILLIGRAM(S): at 13:37

## 2022-10-17 RX ADMIN — Medication 50 MILLIGRAM(S): at 05:06

## 2022-10-17 RX ADMIN — PREGABALIN 1000 MICROGRAM(S): 225 CAPSULE ORAL at 17:51

## 2022-10-17 RX ADMIN — AMLODIPINE BESYLATE 5 MILLIGRAM(S): 2.5 TABLET ORAL at 05:06

## 2022-10-17 RX ADMIN — PANTOPRAZOLE SODIUM 40 MILLIGRAM(S): 20 TABLET, DELAYED RELEASE ORAL at 05:06

## 2022-10-17 RX ADMIN — Medication 100 MILLIGRAM(S): at 05:06

## 2022-10-17 NOTE — PROGRESS NOTE ADULT - SUBJECTIVE AND OBJECTIVE BOX
EDGAR CLAUDIO  57y  Male      Patient is a 57y old  Male who presents with a chief complaint of Diarrhea (17 Oct 2022 13:46)  Patient was seen.chart reviewed.c/o diarrhoea,slight cough.no sob,no cp    REVIEW OF SYSTEMS:  as above  INTERVAL HPI/OVERNIGHT EVENTS:  T(C): 37.9 (10-17-22 @ 16:24), Max: 39.4 (10-17-22 @ 15:36)  HR: 72 (10-17-22 @ 15:36) (61 - 72)  BP: 133/85 (10-17-22 @ 15:36) (116/69 - 147/81)  RR: 18 (10-17-22 @ 15:36) (18 - 18)  SpO2: 100% (10-17-22 @ 15:36) (100% - 100%)  Wt(kg): --  I&O's Summary    T(C): 37.9 (10-17-22 @ 16:24), Max: 39.4 (10-17-22 @ 15:36)  HR: 72 (10-17-22 @ 15:36) (61 - 72)  BP: 133/85 (10-17-22 @ 15:36) (116/69 - 147/81)  RR: 18 (10-17-22 @ 15:36) (18 - 18)  SpO2: 100% (10-17-22 @ 15:36) (100% - 100%)  Wt(kg): --Vital Signs Last 24 Hrs  T(C): 37.9 (17 Oct 2022 16:24), Max: 39.4 (17 Oct 2022 15:36)  T(F): 100.2 (17 Oct 2022 16:24), Max: 102.9 (17 Oct 2022 15:36)  HR: 72 (17 Oct 2022 15:36) (61 - 72)  BP: 133/85 (17 Oct 2022 15:36) (116/69 - 147/81)  BP(mean): --  RR: 18 (17 Oct 2022 15:36) (18 - 18)  SpO2: 100% (17 Oct 2022 15:36) (100% - 100%)    Parameters below as of 17 Oct 2022 15:36  Patient On (Oxygen Delivery Method): room air        LABS:                        10.4   1.09  )-----------( 45       ( 17 Oct 2022 05:20 )             33.0     10-17    134<L>  |  94<L>  |  33<H>  ----------------------------<  89  5.2   |  25  |  6.43<H>    Ca    10.5      17 Oct 2022 05:20  Phos  5.5     10-16  Mg     1.90     10-16    TPro  6.1  /  Alb  x   /  TBili  x   /  DBili  x   /  AST  x   /  ALT  x   /  AlkPhos  x   10-17    PT/INR - ( 17 Oct 2022 10:25 )   PT: 13.3 sec;   INR: 1.14 ratio         PTT - ( 17 Oct 2022 10:25 )  PTT:30.8 sec    CAPILLARY BLOOD GLUCOSE                PAST MEDICAL & SURGICAL HISTORY:  HTN (hypertension)      End stage renal disease on dialysis      Coronary artery disease      Polycystic kidney      Polycystic liver disease      Hepatitis C      AV fistula      Stented coronary artery      Coronary artery disease involving other coronary artery bypass graft          MEDICATIONS  (STANDING):  amLODIPine   Tablet 5 milliGRAM(s) Oral daily  benzonatate 100 milliGRAM(s) Oral every 8 hours  cyanocobalamin Injectable 1000 MICROGram(s) IntraMuscular daily  epoetin nydia-epbx (RETACRIT) Injectable 83663 Unit(s) IV Push <User Schedule>  isosorbide   mononitrate ER Tablet (IMDUR) 30 milliGRAM(s) Oral daily  metoprolol succinate ER 50 milliGRAM(s) Oral daily  pantoprazole    Tablet 40 milliGRAM(s) Oral before breakfast  remdesivir  IVPB   IV Intermittent   remdesivir  IVPB 100 milliGRAM(s) IV Intermittent every 24 hours    MEDICATIONS  (PRN):  acetaminophen     Tablet .. 650 milliGRAM(s) Oral every 6 hours PRN Temp greater or equal to 38C (100.4F), Mild Pain (1 - 3)  ALBUTerol    90 MICROgram(s) HFA Inhaler 2 Puff(s) Inhalation every 6 hours PRN Bronchospasm  aluminum hydroxide/magnesium hydroxide/simethicone Suspension 30 milliLiter(s) Oral every 4 hours PRN Dyspepsia  guaiFENesin Oral Liquid (Sugar-Free) 100 milliGRAM(s) Oral every 6 hours PRN Cough  melatonin 3 milliGRAM(s) Oral at bedtime PRN Insomnia  ondansetron Injectable 4 milliGRAM(s) IV Push every 8 hours PRN Nausea and/or Vomiting  sodium chloride 0.65% Nasal 1 Spray(s) Both Nostrils three times a day PRN Nasal Congestion        RADIOLOGY & ADDITIONAL TESTS:    Imaging Personally Reviewed:  [ ] YES  [ ] NO    Consultant(s) Notes Reviewed:  [x ] YES  [ ] NO    PHYSICAL EXAM:  GENERAL: Alert and awake lying in bed in no distress  HEAD:  Atraumatic, Normocephalic  EYES: EOMI, ARA, conjunctiva and sclera clear  NECK: Supple, No JVD, Normal thyroid  NERVOUS SYSTEM:  Alert & Oriented X3, Motor and sensory systems are intact,   CHEST/LUNG: Decreased breath sounds,clear  HEART: Regular rate and rhythm; No murmurs, rubs, or gallops  ABDOMEN: Soft, Nontender, Nondistended; Bowel sounds present  EXTREMITIES:   Peripheral Pulses are palpable, no  edema        Care Discussed with Consultants/Other Providers [ ] YES  [ ] NO      Code Status: [] Full Code [] DNR [] DNI [] Goals of Care:   Disposition: [] ICU [] Stroke Unit [] RCU []PCU []Floor [] Discharge Home         ALFRED EvansP

## 2022-10-17 NOTE — DIETITIAN INITIAL EVALUATION ADULT - PERTINENT MEDS FT
MEDICATIONS  (STANDING):  amLODIPine   Tablet 5 milliGRAM(s) Oral daily  benzonatate 100 milliGRAM(s) Oral every 8 hours  epoetin nydia-epbx (RETACRIT) Injectable 97508 Unit(s) IV Push <User Schedule>  isosorbide   mononitrate ER Tablet (IMDUR) 30 milliGRAM(s) Oral daily  metoprolol succinate ER 50 milliGRAM(s) Oral daily  pantoprazole    Tablet 40 milliGRAM(s) Oral before breakfast  remdesivir  IVPB   IV Intermittent   remdesivir  IVPB 100 milliGRAM(s) IV Intermittent every 24 hours    MEDICATIONS  (PRN):  acetaminophen     Tablet .. 650 milliGRAM(s) Oral every 6 hours PRN Temp greater or equal to 38C (100.4F), Mild Pain (1 - 3)  ALBUTerol    90 MICROgram(s) HFA Inhaler 2 Puff(s) Inhalation every 6 hours PRN Bronchospasm  aluminum hydroxide/magnesium hydroxide/simethicone Suspension 30 milliLiter(s) Oral every 4 hours PRN Dyspepsia  guaiFENesin Oral Liquid (Sugar-Free) 100 milliGRAM(s) Oral every 6 hours PRN Cough  melatonin 3 milliGRAM(s) Oral at bedtime PRN Insomnia  ondansetron Injectable 4 milliGRAM(s) IV Push every 8 hours PRN Nausea and/or Vomiting  sodium chloride 0.65% Nasal 1 Spray(s) Both Nostrils three times a day PRN Nasal Congestion

## 2022-10-17 NOTE — DIETITIAN INITIAL EVALUATION ADULT - OTHER INFO
Per chart review, Mr. Lee is a 57M with a past medical history of ESRD on HD secondary to ADPKD, HepC presents due to watery diarrhea for the last 4-5 days. Tested positive for COVID, although not endorsing any respiratory complaints. Hematology consulted due to dropping leukocyte count and platelets.    Patient seen at bedside. Reports good appetite, but fair PO intake due to patient's diet restriction. Lunch tray visibly seen untouched today. Patient follows renal diet at home however, currently on DASH/TLC diet in hospital. Labs notable with high Phos 5.5H recommend d/c DASH/TLC, and change to Renal diet-No Conc Potassium, No Conc Phosphorus. Denies chewing and swallowing difficulties. Denies any GI issues (nausea/vomiting/diarrhea/constipation.) at this time. Last BM 10/15. Ordered Zofran. MYESHAFA

## 2022-10-17 NOTE — DIETITIAN INITIAL EVALUATION ADULT - NS FNS WEIGHT CHANGE REASON
UBW 182lbs 1 wk before, most current weight 10/.9lbs. Wt trend reflects weight loss of 3lbs/1.64%x1 wk. Wt loss possibly related to fluid shifting 2/2 ESRD on HD./unintentional

## 2022-10-17 NOTE — PROGRESS NOTE ADULT - ASSESSMENT
ASSESSMENT:  58 yo M with h/o ESRD on T/Th/S dialysis 2/2 PKD, CAD s/p stents p/w 3 days of watery diarrhea up to 4x/day.        (1)Renal -  ESRD-HD - HD tomorrow   (2)CV - BP stable   (3)Anemia - hgb at goal     RECOMMENDATIONS:  (1)HD tomorrow- 2.5 kg UF as able  (2)Retacrit with HD       Lilly Fowler NP  [facility]  Office: [phone] ASSESSMENT:  56 yo M with h/o ESRD on T/Th/S dialysis 2/2 PKD, CAD s/p stents p/w 3 days of watery diarrhea up to 4x/day.        (1)Renal -  ESRD-HD - HD tomorrow   (2)CV - BP stable   (3)Anemia - hgb at goal     RECOMMENDATIONS:  (1)HD tomorrow- 2.5 kg UF as able  (2)Retacrit with HD       Lilly Fowler NP  [facility]  Office: [phone]    RENAL ATTENDING NOTE  Patient seen and examined with NP. Agree with assessment and plan as above.    Simon Andersen MD  [facility]  [phone]

## 2022-10-17 NOTE — DIETITIAN INITIAL EVALUATION ADULT - PERTINENT LABORATORY DATA
10-17    134<L>  |  94<L>  |  33<H>  ----------------------------<  89  5.2   |  25  |  6.43<H>    Ca    10.5      17 Oct 2022 05:20  Phos  5.5     10-16  Mg     1.90     10-16    TPro  6.1  /  Alb  x   /  TBili  x   /  DBili  x   /  AST  x   /  ALT  x   /  AlkPhos  x   10-17

## 2022-10-17 NOTE — PROGRESS NOTE ADULT - SUBJECTIVE AND OBJECTIVE BOX
NEPHROLOGY[facility] [phone]        Patient seen and examined he had HD Saturday 1.6 L removed. He is now seen on room air, + non-productive cough.         MEDICATIONS  (STANDING):  amLODIPine   Tablet 5 milliGRAM(s) Oral daily  benzonatate 100 milliGRAM(s) Oral every 8 hours  epoetin nydia-epbx (RETACRIT) Injectable 02118 Unit(s) IV Push <User Schedule>  isosorbide   mononitrate ER Tablet (IMDUR) 30 milliGRAM(s) Oral daily  metoprolol succinate ER 50 milliGRAM(s) Oral daily  pantoprazole    Tablet 40 milliGRAM(s) Oral before breakfast  remdesivir  IVPB 100 milliGRAM(s) IV Intermittent every 24 hours  remdesivir  IVPB   IV Intermittent       VITAL:  T(C): , Max: 38 (10-16-22 @ 21:51)  T(F): , Max: 100.4 (10-16-22 @ 21:51)  HR: 68 (10-17-22 @ 05:06)  BP: 147/81 (10-17-22 @ 05:06)  BP(mean): --  RR: 18 (10-17-22 @ 05:06)  SpO2: 100% (10-17-22 @ 05:06)  Wt(kg): --    I and O's:        PHYSICAL EXAM:    Constitutional: NAD  Neck:  No JVD  Respiratory: diminished   Cardiovascular: S1 and S2  Gastrointestinal: BS+, soft, NT/ND  Extremities: No peripheral edema  Neurological: A/O x 3, no focal deficits  Psychiatric: Normal mood, normal affect  : No Brooks  Skin: No rashes  Access: ADITI SALGUERO (+thrill)     LABS:                        10.4   1.09  )-----------( 45       ( 17 Oct 2022 05:20 )             33.0     10-17    134<L>  |  94<L>  |  33<H>  ----------------------------<  89  5.2   |  25  |  6.43<H>    Ca    10.5      17 Oct 2022 05:20  Phos  5.5     10-16  Mg     1.90     10-16    TPro  6.1  /  Alb  x   /  TBili  x   /  DBili  x   /  AST  x   /  ALT  x   /  AlkPhos  x   10-17        RADIOLOGY & ADDITIONAL STUDIES:      < from: Xray Chest 1 View- PORTABLE-Urgent (Xray Chest 1 View- PORTABLE-Urgent .) (10.15.22 @ 01:52) >  IMPRESSION:    No focal consolidations.    --- End of Report ---    < end of copied text >

## 2022-10-17 NOTE — DIETITIAN INITIAL EVALUATION ADULT - ADD RECOMMEND
1) Recommend d/c DASH/TLC diet change to Renal diet: No Pro Rstr, No Con Potassium, No Con Phosphorus.   2) Encourage PO intake and honor food preferences as able.   3) Recommend obtain pre/post HD weights to monitor weight trend.   4) Monitor PO intake, Labs, BMs, and skin integrity.   5) RD to remain available for further nutritional interventions as indicated.

## 2022-10-17 NOTE — DIETITIAN INITIAL EVALUATION ADULT - ORAL INTAKE PTA/DIET HISTORY
Patient reports good appetite in general, able to consume 75% of his meals most of the time. Patient states he follows low phos, low potassium(Renal) diet at home due to he is on dialysis.

## 2022-10-18 LAB
ALBUMIN SERPL ELPH-MCNC: 3.2 G/DL — LOW (ref 3.3–5)
ALP SERPL-CCNC: 129 U/L — HIGH (ref 40–120)
ALT FLD-CCNC: 20 U/L — SIGNIFICANT CHANGE UP (ref 4–41)
ANION GAP SERPL CALC-SCNC: 15 MMOL/L — HIGH (ref 7–14)
AST SERPL-CCNC: 64 U/L — HIGH (ref 4–40)
BASOPHILS # BLD AUTO: 0 K/UL — SIGNIFICANT CHANGE UP (ref 0–0.2)
BASOPHILS NFR BLD AUTO: 0 % — SIGNIFICANT CHANGE UP (ref 0–2)
BILIRUB SERPL-MCNC: 0.7 MG/DL — SIGNIFICANT CHANGE UP (ref 0.2–1.2)
BUN SERPL-MCNC: 49 MG/DL — HIGH (ref 7–23)
CALCIUM SERPL-MCNC: 10.8 MG/DL — HIGH (ref 8.4–10.5)
CHLORIDE SERPL-SCNC: 93 MMOL/L — LOW (ref 98–107)
CO2 SERPL-SCNC: 24 MMOL/L — SIGNIFICANT CHANGE UP (ref 22–31)
CREAT SERPL-MCNC: 8.36 MG/DL — HIGH (ref 0.5–1.3)
EGFR: 7 ML/MIN/1.73M2 — LOW
EOSINOPHIL # BLD AUTO: 0 K/UL — SIGNIFICANT CHANGE UP (ref 0–0.5)
EOSINOPHIL NFR BLD AUTO: 0 % — SIGNIFICANT CHANGE UP (ref 0–6)
GLUCOSE SERPL-MCNC: 85 MG/DL — SIGNIFICANT CHANGE UP (ref 70–99)
HCT VFR BLD CALC: 33.3 % — LOW (ref 39–50)
HGB BLD-MCNC: 10.9 G/DL — LOW (ref 13–17)
IANC: 0.99 K/UL — LOW (ref 1.8–7.4)
IMM GRANULOCYTES NFR BLD AUTO: 0.6 % — SIGNIFICANT CHANGE UP (ref 0–0.9)
LYMPHOCYTES # BLD AUTO: 0.6 K/UL — LOW (ref 1–3.3)
LYMPHOCYTES # BLD AUTO: 33.7 % — SIGNIFICANT CHANGE UP (ref 13–44)
MAGNESIUM SERPL-MCNC: 2 MG/DL — SIGNIFICANT CHANGE UP (ref 1.6–2.6)
MCHC RBC-ENTMCNC: 30.8 PG — SIGNIFICANT CHANGE UP (ref 27–34)
MCHC RBC-ENTMCNC: 32.7 GM/DL — SIGNIFICANT CHANGE UP (ref 32–36)
MCV RBC AUTO: 94.1 FL — SIGNIFICANT CHANGE UP (ref 80–100)
MONOCYTES # BLD AUTO: 0.18 K/UL — SIGNIFICANT CHANGE UP (ref 0–0.9)
MONOCYTES NFR BLD AUTO: 10.1 % — SIGNIFICANT CHANGE UP (ref 2–14)
NEUTROPHILS # BLD AUTO: 0.99 K/UL — LOW (ref 1.8–7.4)
NEUTROPHILS NFR BLD AUTO: 55.6 % — SIGNIFICANT CHANGE UP (ref 43–77)
NRBC # BLD: 0 /100 WBCS — SIGNIFICANT CHANGE UP (ref 0–0)
NRBC # FLD: 0 K/UL — SIGNIFICANT CHANGE UP (ref 0–0)
PHOSPHATE SERPL-MCNC: 6.8 MG/DL — HIGH (ref 2.5–4.5)
PLATELET # BLD AUTO: 68 K/UL — LOW (ref 150–400)
POTASSIUM SERPL-MCNC: 5.2 MMOL/L — SIGNIFICANT CHANGE UP (ref 3.5–5.3)
POTASSIUM SERPL-SCNC: 5.2 MMOL/L — SIGNIFICANT CHANGE UP (ref 3.5–5.3)
PROT SERPL-MCNC: 6.6 G/DL — SIGNIFICANT CHANGE UP (ref 6–8.3)
RBC # BLD: 3.54 M/UL — LOW (ref 4.2–5.8)
RBC # FLD: 13.5 % — SIGNIFICANT CHANGE UP (ref 10.3–14.5)
SODIUM SERPL-SCNC: 132 MMOL/L — LOW (ref 135–145)
WBC # BLD: 1.78 K/UL — LOW (ref 3.8–10.5)
WBC # FLD AUTO: 1.78 K/UL — LOW (ref 3.8–10.5)

## 2022-10-18 RX ADMIN — Medication 100 MILLIGRAM(S): at 21:07

## 2022-10-18 RX ADMIN — Medication 100 MILLIGRAM(S): at 19:27

## 2022-10-18 RX ADMIN — Medication 100 MILLIGRAM(S): at 05:19

## 2022-10-18 RX ADMIN — Medication 100 MILLIGRAM(S): at 13:39

## 2022-10-18 RX ADMIN — ERYTHROPOIETIN 10000 UNIT(S): 10000 INJECTION, SOLUTION INTRAVENOUS; SUBCUTANEOUS at 23:00

## 2022-10-18 RX ADMIN — PREGABALIN 1000 MICROGRAM(S): 225 CAPSULE ORAL at 12:18

## 2022-10-18 RX ADMIN — PANTOPRAZOLE SODIUM 40 MILLIGRAM(S): 20 TABLET, DELAYED RELEASE ORAL at 05:18

## 2022-10-18 RX ADMIN — Medication 50 MILLIGRAM(S): at 05:17

## 2022-10-18 RX ADMIN — AMLODIPINE BESYLATE 5 MILLIGRAM(S): 2.5 TABLET ORAL at 06:29

## 2022-10-18 NOTE — PROGRESS NOTE ADULT - SUBJECTIVE AND OBJECTIVE BOX
Overnight events noted      VITAL:  T(C): , Max: 39.4 (10-17-22 @ 15:36)  T(F): , Max: 102.9 (10-17-22 @ 15:36)  HR: 74 (10-18-22 @ 05:14)  BP: 143/78 (10-18-22 @ 05:14)  BP(mean): --  RR: 18 (10-18-22 @ 05:14)  SpO2: 98% (10-18-22 @ 05:14)  Wt(kg): --      PHYSICAL EXAM:  Constitutional: NAD  Neck:  No JVD  Respiratory: diminished   Cardiovascular: S1 and S2  Gastrointestinal: BS+, soft, NT/ND  Extremities: No peripheral edema  Neurological: A/O x 3, no focal deficits  Psychiatric: Normal mood, normal affect  : No Brooks  Skin: No rashes  Access: LUE AVF (+thrill)       LABS:                        10.9   1.78  )-----------( 68       ( 18 Oct 2022 06:43 )             33.3     Na(132)/K(5.2)/Cl(93)/HCO3(24)/BUN(49)/Cr(8.36)Glu(85)/Ca(10.8)/Mg(2.00)/PO4(6.8)    10-18 @ 06:43  Na(134)/K(5.2)/Cl(94)/HCO3(25)/BUN(33)/Cr(6.43)Glu(89)/Ca(10.5)/Mg(--)/PO4(--)    10-17 @ 05:20  Na(134)/K(4.1)/Cl(94)/HCO3(28)/BUN(26)/Cr(5.36)Glu(85)/Ca(10.3)/Mg(1.90)/PO4(5.5)    10-16 @ 13:27      ASSESSMENT: 57M w/ [id number], HTN, and CAD, 10/16/22 p/w COVID/diarrhea/fever     (1)Renal -  ESRD-HD - due for next HD today  (2)CV - hemodynamically stable  (3)COVID - on Remdesivir    RECOMMENDATIONS:  (1)HD today- 2.5 kg UF as able  (2)Retacrit with HD   (3)Remdesivir per primary team          Simon Andersen MD  [facility]  Office: [phone]  Cell: [phone]       (+)cough; (+)minimal SOB  no diarrhea    VITAL:  T(C): , Max: 39.4 (10-17-22 @ 15:36)  T(F): , Max: 102.9 (10-17-22 @ 15:36)  HR: 74 (10-18-22 @ 05:14)  BP: 143/78 (10-18-22 @ 05:14)  BP(mean): --  RR: 18 (10-18-22 @ 05:14)  SpO2: 98% (10-18-22 @ 05:14)  Wt(kg): --      PHYSICAL EXAM:  Constitutional: NAD at rest on NCO2  HEENT: NCAT, MMM  Neck:  No JVD  Respiratory: grossly clear b/l  Cardiovascular: reg s1s2  Gastrointestinal: BS+, soft, NT/ND  Extremities: No peripheral edema  Neurological: A/O x 3, no focal deficits  Psychiatric: Normal mood, normal affect  : No Brooks  Skin: No rashes  Access: LUE AVF (+thrill)       LABS:                        10.9   1.78  )-----------( 68       ( 18 Oct 2022 06:43 )             33.3     Na(132)/K(5.2)/Cl(93)/HCO3(24)/BUN(49)/Cr(8.36)Glu(85)/Ca(10.8)/Mg(2.00)/PO4(6.8)    10-18 @ 06:43  Na(134)/K(5.2)/Cl(94)/HCO3(25)/BUN(33)/Cr(6.43)Glu(89)/Ca(10.5)/Mg(--)/PO4(--)    10-17 @ 05:20  Na(134)/K(4.1)/Cl(94)/HCO3(28)/BUN(26)/Cr(5.36)Glu(85)/Ca(10.3)/Mg(1.90)/PO4(5.5)    10-16 @ 13:27      ASSESSMENT: 57M w/ [id number], HTN, and CAD, 10/16/22 p/w COVID/diarrhea/fever     (1)Renal -  ESRD-HD - due for next HD today  (2)CV - hemodynamically stable  (3)COVID - on Remdesivir    RECOMMENDATIONS:  (1)HD today- 2.5 kg UF as able  (2)Retacrit with HD   (3)Remdesivir per primary team          Simon Andersen MD  [facility]  Office: [phone]  Cell: [phone]

## 2022-10-18 NOTE — PROGRESS NOTE ADULT - SUBJECTIVE AND OBJECTIVE BOX
GONZÁLEZ EDGAR  57y  Male    10/18/2022  Afebrile overnight    REVIEW OF SYSTEMS:  as above  INTERVAL HPI/OVERNIGHT EVENTS:  T(C): 37.9 (10-17-22 @ 16:24), Max: 39.4 (10-17-22 @ 15:36)  HR: 72 (10-17-22 @ 15:36) (61 - 72)  BP: 133/85 (10-17-22 @ 15:36) (116/69 - 147/81)  RR: 18 (10-17-22 @ 15:36) (18 - 18)  SpO2: 100% (10-17-22 @ 15:36) (100% - 100%)  Wt(kg): --  I&O's Summary    T(C): 37.9 (10-17-22 @ 16:24), Max: 39.4 (10-17-22 @ 15:36)  HR: 72 (10-17-22 @ 15:36) (61 - 72)  BP: 133/85 (10-17-22 @ 15:36) (116/69 - 147/81)  RR: 18 (10-17-22 @ 15:36) (18 - 18)  SpO2: 100% (10-17-22 @ 15:36) (100% - 100%)  Wt(kg): --Vital Signs Last 24 Hrs  T(C): 37.9 (17 Oct 2022 16:24), Max: 39.4 (17 Oct 2022 15:36)  T(F): 100.2 (17 Oct 2022 16:24), Max: 102.9 (17 Oct 2022 15:36)  HR: 72 (17 Oct 2022 15:36) (61 - 72)  BP: 133/85 (17 Oct 2022 15:36) (116/69 - 147/81)  BP(mean): --  RR: 18 (17 Oct 2022 15:36) (18 - 18)  SpO2: 100% (17 Oct 2022 15:36) (100% - 100%)    Parameters below as of 17 Oct 2022 15:36  Patient On (Oxygen Delivery Method): room air        LABS:                        10.4   1.09  )-----------( 45       ( 17 Oct 2022 05:20 )             33.0     10-17    134<L>  |  94<L>  |  33<H>  ----------------------------<  89  5.2   |  25  |  6.43<H>    Ca    10.5      17 Oct 2022 05:20  Phos  5.5     10-16  Mg     1.90     10-16    TPro  6.1  /  Alb  x   /  TBili  x   /  DBili  x   /  AST  x   /  ALT  x   /  AlkPhos  x   10-17    PT/INR - ( 17 Oct 2022 10:25 )   PT: 13.3 sec;   INR: 1.14 ratio         PTT - ( 17 Oct 2022 10:25 )  PTT:30.8 sec    CAPILLARY BLOOD GLUCOSE                PAST MEDICAL & SURGICAL HISTORY:  HTN (hypertension)      End stage renal disease on dialysis      Coronary artery disease      Polycystic kidney      Polycystic liver disease      Hepatitis C      AV fistula      Stented coronary artery      Coronary artery disease involving other coronary artery bypass graft          MEDICATIONS  (STANDING):  amLODIPine   Tablet 5 milliGRAM(s) Oral daily  benzonatate 100 milliGRAM(s) Oral every 8 hours  cyanocobalamin Injectable 1000 MICROGram(s) IntraMuscular daily  epoetin nydia-epbx (RETACRIT) Injectable 42086 Unit(s) IV Push <User Schedule>  isosorbide   mononitrate ER Tablet (IMDUR) 30 milliGRAM(s) Oral daily  metoprolol succinate ER 50 milliGRAM(s) Oral daily  pantoprazole    Tablet 40 milliGRAM(s) Oral before breakfast  remdesivir  IVPB   IV Intermittent   remdesivir  IVPB 100 milliGRAM(s) IV Intermittent every 24 hours    MEDICATIONS  (PRN):  acetaminophen     Tablet .. 650 milliGRAM(s) Oral every 6 hours PRN Temp greater or equal to 38C (100.4F), Mild Pain (1 - 3)  ALBUTerol    90 MICROgram(s) HFA Inhaler 2 Puff(s) Inhalation every 6 hours PRN Bronchospasm  aluminum hydroxide/magnesium hydroxide/simethicone Suspension 30 milliLiter(s) Oral every 4 hours PRN Dyspepsia  guaiFENesin Oral Liquid (Sugar-Free) 100 milliGRAM(s) Oral every 6 hours PRN Cough  melatonin 3 milliGRAM(s) Oral at bedtime PRN Insomnia  ondansetron Injectable 4 milliGRAM(s) IV Push every 8 hours PRN Nausea and/or Vomiting  sodium chloride 0.65% Nasal 1 Spray(s) Both Nostrils three times a day PRN Nasal Congestion        RADIOLOGY & ADDITIONAL TESTS:    Imaging Personally Reviewed:  [ ] YES  [ ] NO    Consultant(s) Notes Reviewed:  [x ] YES  [ ] NO    PHYSICAL EXAM:  GENERAL: Alert and awake lying in bed in no distress  HEAD:  Atraumatic, Normocephalic  EYES: EOMI, ARA, conjunctiva and sclera clear  NECK: Supple, No JVD, Normal thyroid  NERVOUS SYSTEM:  Alert & Oriented X3, Motor and sensory systems are intact,   CHEST/LUNG: Decreased breath sounds,clear  HEART: Regular rate and rhythm; No murmurs, rubs, or gallops  ABDOMEN: Soft, Nontender, Nondistended; Bowel sounds present  EXTREMITIES:   Peripheral Pulses are palpable, no  edema        Care Discussed with Consultants/Other Providers [ ] YES  [ ] NO      Code Status: [] Full Code [] DNR [] DNI [] Goals of Care:   Disposition: [] ICU [] Stroke Unit [] RCU []PCU []Floor [] Discharge Home         ALFRED EvansP

## 2022-10-19 LAB
ALBUMIN SERPL ELPH-MCNC: 3.2 G/DL — LOW (ref 3.3–5)
ALP SERPL-CCNC: 114 U/L — SIGNIFICANT CHANGE UP (ref 40–120)
ALT FLD-CCNC: 24 U/L — SIGNIFICANT CHANGE UP (ref 4–41)
ANION GAP SERPL CALC-SCNC: 14 MMOL/L — SIGNIFICANT CHANGE UP (ref 7–14)
AST SERPL-CCNC: 58 U/L — HIGH (ref 4–40)
BASOPHILS # BLD AUTO: 0.01 K/UL — SIGNIFICANT CHANGE UP (ref 0–0.2)
BASOPHILS NFR BLD AUTO: 0.6 % — SIGNIFICANT CHANGE UP (ref 0–2)
BILIRUB SERPL-MCNC: 0.7 MG/DL — SIGNIFICANT CHANGE UP (ref 0.2–1.2)
BUN SERPL-MCNC: 38 MG/DL — HIGH (ref 7–23)
CALCIUM SERPL-MCNC: 10.9 MG/DL — HIGH (ref 8.4–10.5)
CHLORIDE SERPL-SCNC: 93 MMOL/L — LOW (ref 98–107)
CO2 SERPL-SCNC: 26 MMOL/L — SIGNIFICANT CHANGE UP (ref 22–31)
CREAT SERPL-MCNC: 6.02 MG/DL — HIGH (ref 0.5–1.3)
EGFR: 10 ML/MIN/1.73M2 — LOW
EOSINOPHIL # BLD AUTO: 0.01 K/UL — SIGNIFICANT CHANGE UP (ref 0–0.5)
EOSINOPHIL NFR BLD AUTO: 0.6 % — SIGNIFICANT CHANGE UP (ref 0–6)
GLUCOSE SERPL-MCNC: 89 MG/DL — SIGNIFICANT CHANGE UP (ref 70–99)
HCT VFR BLD CALC: 29.6 % — LOW (ref 39–50)
HGB BLD-MCNC: 10 G/DL — LOW (ref 13–17)
IANC: 0.99 K/UL — LOW (ref 1.8–7.4)
IMM GRANULOCYTES NFR BLD AUTO: 0.6 % — SIGNIFICANT CHANGE UP (ref 0–0.9)
LYMPHOCYTES # BLD AUTO: 0.51 K/UL — LOW (ref 1–3.3)
LYMPHOCYTES # BLD AUTO: 29.1 % — SIGNIFICANT CHANGE UP (ref 13–44)
MAGNESIUM SERPL-MCNC: 1.9 MG/DL — SIGNIFICANT CHANGE UP (ref 1.6–2.6)
MCHC RBC-ENTMCNC: 31.3 PG — SIGNIFICANT CHANGE UP (ref 27–34)
MCHC RBC-ENTMCNC: 33.8 GM/DL — SIGNIFICANT CHANGE UP (ref 32–36)
MCV RBC AUTO: 92.5 FL — SIGNIFICANT CHANGE UP (ref 80–100)
MONOCYTES # BLD AUTO: 0.22 K/UL — SIGNIFICANT CHANGE UP (ref 0–0.9)
MONOCYTES NFR BLD AUTO: 12.6 % — SIGNIFICANT CHANGE UP (ref 2–14)
NEUTROPHILS # BLD AUTO: 0.99 K/UL — LOW (ref 1.8–7.4)
NEUTROPHILS NFR BLD AUTO: 56.5 % — SIGNIFICANT CHANGE UP (ref 43–77)
NRBC # BLD: 0 /100 WBCS — SIGNIFICANT CHANGE UP (ref 0–0)
NRBC # FLD: 0 K/UL — SIGNIFICANT CHANGE UP (ref 0–0)
PHOSPHATE SERPL-MCNC: 5.2 MG/DL — HIGH (ref 2.5–4.5)
PLATELET # BLD AUTO: 72 K/UL — LOW (ref 150–400)
POTASSIUM SERPL-MCNC: 4.2 MMOL/L — SIGNIFICANT CHANGE UP (ref 3.5–5.3)
POTASSIUM SERPL-SCNC: 4.2 MMOL/L — SIGNIFICANT CHANGE UP (ref 3.5–5.3)
PROT SERPL-MCNC: 6.1 G/DL — SIGNIFICANT CHANGE UP (ref 6–8.3)
RBC # BLD: 3.2 M/UL — LOW (ref 4.2–5.8)
RBC # FLD: 13.4 % — SIGNIFICANT CHANGE UP (ref 10.3–14.5)
SODIUM SERPL-SCNC: 133 MMOL/L — LOW (ref 135–145)
WBC # BLD: 1.75 K/UL — LOW (ref 3.8–10.5)
WBC # FLD AUTO: 1.75 K/UL — LOW (ref 3.8–10.5)

## 2022-10-19 PROCEDURE — 99232 SBSQ HOSP IP/OBS MODERATE 35: CPT

## 2022-10-19 RX ORDER — CHLORHEXIDINE GLUCONATE 213 G/1000ML
1 SOLUTION TOPICAL DAILY
Refills: 0 | Status: DISCONTINUED | OUTPATIENT
Start: 2022-10-19 | End: 2022-10-23

## 2022-10-19 RX ADMIN — Medication 50 MILLIGRAM(S): at 05:09

## 2022-10-19 RX ADMIN — Medication 100 MILLIGRAM(S): at 22:09

## 2022-10-19 RX ADMIN — CHLORHEXIDINE GLUCONATE 1 APPLICATION(S): 213 SOLUTION TOPICAL at 12:54

## 2022-10-19 RX ADMIN — Medication 100 MILLIGRAM(S): at 12:53

## 2022-10-19 RX ADMIN — PREGABALIN 1000 MICROGRAM(S): 225 CAPSULE ORAL at 16:53

## 2022-10-19 RX ADMIN — Medication 100 MILLIGRAM(S): at 05:09

## 2022-10-19 RX ADMIN — REMDESIVIR 500 MILLIGRAM(S): 5 INJECTION INTRAVENOUS at 01:01

## 2022-10-19 RX ADMIN — Medication 100 MILLIGRAM(S): at 02:52

## 2022-10-19 RX ADMIN — AMLODIPINE BESYLATE 5 MILLIGRAM(S): 2.5 TABLET ORAL at 05:09

## 2022-10-19 RX ADMIN — PANTOPRAZOLE SODIUM 40 MILLIGRAM(S): 20 TABLET, DELAYED RELEASE ORAL at 05:09

## 2022-10-19 RX ADMIN — ISOSORBIDE MONONITRATE 30 MILLIGRAM(S): 60 TABLET, EXTENDED RELEASE ORAL at 12:53

## 2022-10-19 NOTE — PROGRESS NOTE ADULT - SUBJECTIVE AND OBJECTIVE BOX
Follow Up:  covid+  pancytopenia  diarrhea    Interval History/ROS:   c/o cough+   abdominal pain now mid-abdomen  nodiarrhea    Allergies  No Known Allergies        ANTIMICROBIALS:      OTHER MEDS:  acetaminophen     Tablet .. 650 milliGRAM(s) Oral every 6 hours PRN  ALBUTerol    90 MICROgram(s) HFA Inhaler 2 Puff(s) Inhalation every 6 hours PRN  aluminum hydroxide/magnesium hydroxide/simethicone Suspension 30 milliLiter(s) Oral every 4 hours PRN  amLODIPine   Tablet 5 milliGRAM(s) Oral daily  benzonatate 100 milliGRAM(s) Oral every 8 hours  chlorhexidine 2% Cloths 1 Application(s) Topical daily  cyanocobalamin Injectable 1000 MICROGram(s) IntraMuscular daily  epoetin nydia-epbx (RETACRIT) Injectable 20912 Unit(s) IV Push <User Schedule>  guaiFENesin Oral Liquid (Sugar-Free) 100 milliGRAM(s) Oral every 6 hours PRN  hydrocodone/homatropine Syrup 5 milliLiter(s) Oral every 8 hours PRN  isosorbide   mononitrate ER Tablet (IMDUR) 30 milliGRAM(s) Oral daily  melatonin 3 milliGRAM(s) Oral at bedtime PRN  metoprolol succinate ER 50 milliGRAM(s) Oral daily  ondansetron Injectable 4 milliGRAM(s) IV Push every 8 hours PRN  pantoprazole    Tablet 40 milliGRAM(s) Oral before breakfast  sodium chloride 0.65% Nasal 1 Spray(s) Both Nostrils three times a day PRN      Vital Signs Last 24 Hrs  T(C): 36.7 (19 Oct 2022 15:48), Max: 37.7 (19 Oct 2022 05:02)  T(F): 98.1 (19 Oct 2022 15:48), Max: 99.8 (19 Oct 2022 05:02)  HR: 68 (19 Oct 2022 15:48) (68 - 78)  BP: 102/63 (19 Oct 2022 15:48) (102/63 - 123/71)  BP(mean): --  RR: 17 (19 Oct 2022 15:48) (17 - 20)  SpO2: 94% (19 Oct 2022 17:03) (94% - 100%)    Parameters below as of 19 Oct 2022 17:03  Patient On (Oxygen Delivery Method): room air        PHYSICAL EXAM:  Constitutional: Not in acute distress  Eyes: No icterus.  Oral cavity: Clear, no lesions  Neck: Supple  RS: Chest clear   CVS: S1, S2   Abdomen: Soft. No guarding/rigidity/tenderness.  : no hayes  Neuro: Alert, oriented to time/place/person  Cranial nerves 2-12 grossly normal. No focal abnormalities                          10.0   1.75  )-----------( 72       ( 19 Oct 2022 06:12 )             29.6       10-19    133<L>  |  93<L>  |  38<H>  ----------------------------<  89  4.2   |  26  |  6.02<H>    Ca    10.9<H>      19 Oct 2022 06:12  Phos  5.2     10-19  Mg     1.90     10-19    TPro  6.1  /  Alb  3.2<L>  /  TBili  0.7  /  DBili  x   /  AST  58<H>  /  ALT  24  /  AlkPhos  114  10-19          MICROBIOLOGY:    Hepatitis C Virus RNA Detection by PCR (10.17.22 @ 05:20)   Hepatitis C RNA, Interpretation: NotDetec: Results Interpretation   Not Detected HCV RNA not detectedHepatitis C Antibody Test (10.15.22 @ 21:40)   Hepatitis C Virus S/CO Ratio: 14.96 S/CO   Hepatitis C Virus Interpretation: Reactive:           RADIOLOGY:    < from: Xray Chest 1 View- PORTABLE-Urgent (Xray Chest 1 View- PORTABLE-Urgent .) (10.15.22 @ 01:52) >    ACC: [id number] EXAM:  XR CHEST PORTABLE URGENT 1V                          PROCEDURE DATE:  10/15/2022          INTERPRETATION:  CLINICAL INDICATION:  covid +, end-stage renal disease   on dialysis    COMPARISON: Chest radiograph dated 3/2/2016    TECHNIQUE: Frontal radiograph of the chest.    FINDINGS:    Cardiac/mediastinum/hilum: Median sternotomy wires and surgical clips.   Heart size cannot be accurately assessed on this projection.    Lung parenchyma/Pleura: The lungs are clear. There is no pleural   effusion. There is no pneumothorax.    Skeleton/soft tissues: No acute osseous abnormalities.    IMPRESSION:    No focal consolidations.    --- End of Report ---          LIA RUIZ MD; Resident Radiologist  This document has been electronically signed.  EULALIA REYNOLDS MD; Attending Radiologist  This document has been electronically signed. Oct 15 2022  8:06AM    < end of copied text >

## 2022-10-19 NOTE — PROGRESS NOTE ADULT - ASSESSMENT
Mr. Lee is a 56 yo M with a PMH of ESRD on T/Th/S dialysis 2/2 PKD, CAD s/p stents presented 10/15 with 3 days of watery diarrhea and fatigue.  He got his dialysis on Friday 10/14 however his nephrologist felt he was getting too weak and sent him into the hospital. He had COVID on Oct 5 with a cough and later tested positive at [facility].   In the ED 10/15, febrile to 101.6, Wbc 2.2, Hgb 10 (at baseline). Plt 72. COVID +. CXR without consolidations. ID consulted for the same.     WORKUP  CXR: CLear  RVP: COVID 19 +    DIAGNOSIS and IMPRESSION  ·	Fever due to covid  ·	Pancytopenia likely due to covid  ·	COVID infection  ·	ESRD on HD    Tested COVID 19 positive on 10/5  Symptoms: Diarrhea, Cough  Unvaccinated  Diarrhea and pancytopenia can be see in COVID   Hep C positive; viral load not detected    RECOMMENDATIONS    c/w  remdesevir  - Continue supplemental O2 and supportive care per primary team  - Continue Contact and Airborne Isolation precautions  - Recommend following Labs Q72hrs - ESR; CRP; Procalcitonin; Ferritin; LDH; d-dimer        Pt seen and examined. Case d/w attending   Recommendation provided to primary team via Page.      Dav Colon MD, PGY5  ID fellow  [employer] Teams Preferred  After 5pm/weekends call [phone]

## 2022-10-19 NOTE — PROGRESS NOTE ADULT - SUBJECTIVE AND OBJECTIVE BOX
NEPHROLOGY     Patient seen and examined, reports of cough and occasional sob, at present he is comfortable on 3L NC, denies pain, in no acute distress. Tolerated HD yesterday and removed 2L.     MEDICATIONS  (STANDING):  amLODIPine   Tablet 5 milliGRAM(s) Oral daily  benzonatate 100 milliGRAM(s) Oral every 8 hours  chlorhexidine 2% Cloths 1 Application(s) Topical daily  cyanocobalamin Injectable 1000 MICROGram(s) IntraMuscular daily  epoetin nydia-epbx (RETACRIT) Injectable 81317 Unit(s) IV Push <User Schedule>  isosorbide   mononitrate ER Tablet (IMDUR) 30 milliGRAM(s) Oral daily  metoprolol succinate ER 50 milliGRAM(s) Oral daily  pantoprazole    Tablet 40 milliGRAM(s) Oral before breakfast    VITALS:  T(C): , Max: 37.7 (10-19-22 @ 05:02)  T(F): , Max: 99.8 (10-19-22 @ 05:02)  HR: 74 (10-19-22 @ 05:02)  BP: 109/73 (10-19-22 @ 05:02)  RR: 17 (10-19-22 @ 05:02)  SpO2: 98% (10-19-22 @ 05:02)    I and O's:    10-18 @ 07:01  -  10-19 @ 07:00  --------------------------------------------------------  IN: 400 mL / OUT: 2400 mL / NET: -2000 mL    PHYSICAL EXAM:  Constitutional: NAD at rest on NCO2  HEENT: NCAT, MMM  Neck:  No JVD  Respiratory: grossly clear b/l  Cardiovascular: reg s1s2  Gastrointestinal: BS+, soft, NT/ND  Extremities: No peripheral edema  Neurological: A/O x 3, no focal deficits  Psychiatric: Normal mood, normal affect  : No Brooks  Skin: No rashes  Access: BRANDYE AVF (+thrill)     LABS:                        10.0   1.75  )-----------( 72       ( 19 Oct 2022 06:12 )             29.6     10-19    133<L>  |  93<L>  |  38<H>  ----------------------------<  89  4.2   |  26  |  6.02<H>    Ca    10.9<H>      19 Oct 2022 06:12  Phos  5.2     10-19  Mg     1.90     10-19    TPro  6.1  /  Alb  3.2<L>  /  TBili  0.7  /  DBili  x   /  AST  58<H>  /  ALT  24  /  AlkPhos  114  10-19

## 2022-10-19 NOTE — PROGRESS NOTE ADULT - ASSESSMENT
ASSESSMENT: 57M w/ [id number], HTN, and CAD, 10/16/22 p/w COVID/diarrhea/fever     (1)Renal -  ESRD-HD - due for next HD tomorrow  (2)CV - hemodynamically stable  (3)COVID - s/p Remdesivir    RECOMMENDATIONS:  (1)HD tomorrow - 2.5 kg UF as able  (2)Retacrit with HD     MADELEINE Rahman  [facility]  [phone]        ASSESSMENT: 57M w/ [id number], HTN, and CAD, 10/16/22 p/w COVID/diarrhea/fever     (1)Renal -  ESRD-HD - due for next HD tomorrow  (2)CV - hemodynamically stable  (3)COVID - s/p Remdesivir    RECOMMENDATIONS:  (1)HD tomorrow - 2.5 kg UF as able  (2)Retacrit with HD     Liset Kumari NP-C  [facility]  [phone]       RENAL ATTENDING NOTE  Patient seen and examined with NP. Agree with assessment and plan as above.    Simon Andersen MD  [facility]  [phone]

## 2022-10-19 NOTE — PROGRESS NOTE ADULT - SUBJECTIVE AND OBJECTIVE BOX
GONZÁLEZ EDGAR  57y  Male    10/19/2022  Afebrile overnight    REVIEW OF SYSTEMS:  as above  INTERVAL HPI/OVERNIGHT EVENTS:  T(C): 37.9 (10-17-22 @ 16:24), Max: 39.4 (10-17-22 @ 15:36)  HR: 72 (10-17-22 @ 15:36) (61 - 72)  BP: 133/85 (10-17-22 @ 15:36) (116/69 - 147/81)  RR: 18 (10-17-22 @ 15:36) (18 - 18)  SpO2: 100% (10-17-22 @ 15:36) (100% - 100%)  Wt(kg): --  I&O's Summary    T(C): 37.9 (10-17-22 @ 16:24), Max: 39.4 (10-17-22 @ 15:36)  HR: 72 (10-17-22 @ 15:36) (61 - 72)  BP: 133/85 (10-17-22 @ 15:36) (116/69 - 147/81)  RR: 18 (10-17-22 @ 15:36) (18 - 18)  SpO2: 100% (10-17-22 @ 15:36) (100% - 100%)  Wt(kg): --Vital Signs Last 24 Hrs  T(C): 37.9 (17 Oct 2022 16:24), Max: 39.4 (17 Oct 2022 15:36)  T(F): 100.2 (17 Oct 2022 16:24), Max: 102.9 (17 Oct 2022 15:36)  HR: 72 (17 Oct 2022 15:36) (61 - 72)  BP: 133/85 (17 Oct 2022 15:36) (116/69 - 147/81)  BP(mean): --  RR: 18 (17 Oct 2022 15:36) (18 - 18)  SpO2: 100% (17 Oct 2022 15:36) (100% - 100%)    Parameters below as of 17 Oct 2022 15:36  Patient On (Oxygen Delivery Method): room air        LABS:                        10.4   1.09  )-----------( 45       ( 17 Oct 2022 05:20 )             33.0     10-17    134<L>  |  94<L>  |  33<H>  ----------------------------<  89  5.2   |  25  |  6.43<H>    Ca    10.5      17 Oct 2022 05:20  Phos  5.5     10-16  Mg     1.90     10-16    TPro  6.1  /  Alb  x   /  TBili  x   /  DBili  x   /  AST  x   /  ALT  x   /  AlkPhos  x   10-17    PT/INR - ( 17 Oct 2022 10:25 )   PT: 13.3 sec;   INR: 1.14 ratio         PTT - ( 17 Oct 2022 10:25 )  PTT:30.8 sec    CAPILLARY BLOOD GLUCOSE                PAST MEDICAL & SURGICAL HISTORY:  HTN (hypertension)      End stage renal disease on dialysis      Coronary artery disease      Polycystic kidney      Polycystic liver disease      Hepatitis C      AV fistula      Stented coronary artery      Coronary artery disease involving other coronary artery bypass graft          MEDICATIONS  (STANDING):  amLODIPine   Tablet 5 milliGRAM(s) Oral daily  benzonatate 100 milliGRAM(s) Oral every 8 hours  cyanocobalamin Injectable 1000 MICROGram(s) IntraMuscular daily  epoetin nydia-epbx (RETACRIT) Injectable 82648 Unit(s) IV Push <User Schedule>  isosorbide   mononitrate ER Tablet (IMDUR) 30 milliGRAM(s) Oral daily  metoprolol succinate ER 50 milliGRAM(s) Oral daily  pantoprazole    Tablet 40 milliGRAM(s) Oral before breakfast  remdesivir  IVPB   IV Intermittent   remdesivir  IVPB 100 milliGRAM(s) IV Intermittent every 24 hours    MEDICATIONS  (PRN):  acetaminophen     Tablet .. 650 milliGRAM(s) Oral every 6 hours PRN Temp greater or equal to 38C (100.4F), Mild Pain (1 - 3)  ALBUTerol    90 MICROgram(s) HFA Inhaler 2 Puff(s) Inhalation every 6 hours PRN Bronchospasm  aluminum hydroxide/magnesium hydroxide/simethicone Suspension 30 milliLiter(s) Oral every 4 hours PRN Dyspepsia  guaiFENesin Oral Liquid (Sugar-Free) 100 milliGRAM(s) Oral every 6 hours PRN Cough  melatonin 3 milliGRAM(s) Oral at bedtime PRN Insomnia  ondansetron Injectable 4 milliGRAM(s) IV Push every 8 hours PRN Nausea and/or Vomiting  sodium chloride 0.65% Nasal 1 Spray(s) Both Nostrils three times a day PRN Nasal Congestion        RADIOLOGY & ADDITIONAL TESTS:    Imaging Personally Reviewed:  [ ] YES  [ ] NO    Consultant(s) Notes Reviewed:  [x ] YES  [ ] NO    PHYSICAL EXAM:  GENERAL: Alert and awake lying in bed in no distress  HEAD:  Atraumatic, Normocephalic  EYES: EOMI, ARA, conjunctiva and sclera clear  NECK: Supple, No JVD, Normal thyroid  NERVOUS SYSTEM:  Alert & Oriented X3, Motor and sensory systems are intact,   CHEST/LUNG: Decreased breath sounds,clear  HEART: Regular rate and rhythm; No murmurs, rubs, or gallops  ABDOMEN: Soft, Nontender, Nondistended; Bowel sounds present  EXTREMITIES:   Peripheral Pulses are palpable, no  edema        Care Discussed with Consultants/Other Providers [ ] YES  [ ] NO      Code Status: [] Full Code [] DNR [] DNI [] Goals of Care:   Disposition: [] ICU [] Stroke Unit [] RCU []PCU []Floor [] Discharge Home         ALFRED EvansP

## 2022-10-20 ENCOUNTER — APPOINTMENT (OUTPATIENT)
Dept: CARDIOLOGY | Facility: CLINIC | Age: 57
End: 2022-10-20

## 2022-10-20 LAB
% ALBUMIN: 47.9 % — SIGNIFICANT CHANGE UP
% ALPHA 1: 4.9 % — SIGNIFICANT CHANGE UP
% ALPHA 2: 11.9 % — SIGNIFICANT CHANGE UP
% BETA: 10.4 % — SIGNIFICANT CHANGE UP
% GAMMA, URINE: PRESENT
% GAMMA: 24.9 % — SIGNIFICANT CHANGE UP
ALBUMIN 24H MFR UR ELPH: PRESENT
ALBUMIN SERPL ELPH-MCNC: 2.92 G/DL — LOW (ref 3.3–4.4)
ALBUMIN/GLOB SERPL ELPH: 0.9 RATIO — SIGNIFICANT CHANGE UP
ALPHA1 GLOB 24H MFR UR ELPH: PRESENT
ALPHA1 GLOB SERPL ELPH-MCNC: 0.3 G/DL — SIGNIFICANT CHANGE UP (ref 0.1–0.3)
ALPHA2 GLOB 24H MFR UR ELPH: PRESENT
ALPHA2 GLOB SERPL ELPH-MCNC: 0.7 G/DL — SIGNIFICANT CHANGE UP (ref 0.6–1)
ANION GAP SERPL CALC-SCNC: 18 MMOL/L — HIGH (ref 7–14)
B-GLOBULIN 24H MFR UR ELPH: PRESENT
B-GLOBULIN SERPL ELPH-MCNC: 0.63 G/DL — SIGNIFICANT CHANGE UP (ref 0.6–1.1)
BUN SERPL-MCNC: 62 MG/DL — HIGH (ref 7–23)
CALCIUM SERPL-MCNC: 10.5 MG/DL — SIGNIFICANT CHANGE UP (ref 8.4–10.5)
CHLORIDE SERPL-SCNC: 95 MMOL/L — LOW (ref 98–107)
CO2 SERPL-SCNC: 25 MMOL/L — SIGNIFICANT CHANGE UP (ref 22–31)
CREAT SERPL-MCNC: 7.88 MG/DL — HIGH (ref 0.5–1.3)
CRP SERPL-MCNC: 49.5 MG/L — HIGH
D DIMER BLD IA.RAPID-MCNC: 381 NG/ML DDU — HIGH
EGFR: 7 ML/MIN/1.73M2 — LOW
FERRITIN SERPL-MCNC: 1986 NG/ML — HIGH (ref 30–400)
GAMMA GLOBULIN: 1.52 G/DL — SIGNIFICANT CHANGE UP (ref 0.7–1.7)
GLUCOSE SERPL-MCNC: 83 MG/DL — SIGNIFICANT CHANGE UP (ref 70–99)
HCT VFR BLD CALC: 28.6 % — LOW (ref 39–50)
HGB BLD-MCNC: 9.6 G/DL — LOW (ref 13–17)
LDH SERPL L TO P-CCNC: 256 U/L — HIGH (ref 135–225)
M PROTEIN 24H UR ELPH-MRATE: PRESENT — SIGNIFICANT CHANGE UP
MAGNESIUM SERPL-MCNC: 2 MG/DL — SIGNIFICANT CHANGE UP (ref 1.6–2.6)
MCHC RBC-ENTMCNC: 31 PG — SIGNIFICANT CHANGE UP (ref 27–34)
MCHC RBC-ENTMCNC: 33.6 GM/DL — SIGNIFICANT CHANGE UP (ref 32–36)
MCV RBC AUTO: 92.3 FL — SIGNIFICANT CHANGE UP (ref 80–100)
NRBC # BLD: 0 /100 WBCS — SIGNIFICANT CHANGE UP (ref 0–0)
NRBC # FLD: 0 K/UL — SIGNIFICANT CHANGE UP (ref 0–0)
PHOSPHATE SERPL-MCNC: 6.5 MG/DL — HIGH (ref 2.5–4.5)
PLATELET # BLD AUTO: 78 K/UL — LOW (ref 150–400)
POTASSIUM SERPL-MCNC: 4.7 MMOL/L — SIGNIFICANT CHANGE UP (ref 3.5–5.3)
POTASSIUM SERPL-SCNC: 4.7 MMOL/L — SIGNIFICANT CHANGE UP (ref 3.5–5.3)
PROCALCITONIN SERPL-MCNC: 1.52 NG/ML — HIGH (ref 0.02–0.1)
PROT ?TM UR-MCNC: 221 MG/DL — SIGNIFICANT CHANGE UP
PROT PATTERN 24H UR ELPH-IMP: SIGNIFICANT CHANGE UP
PROT PATTERN SERPL ELPH-IMP: SIGNIFICANT CHANGE UP
PROT SERPL-MCNC: 6.1 G/DL — SIGNIFICANT CHANGE UP
RBC # BLD: 3.1 M/UL — LOW (ref 4.2–5.8)
RBC # FLD: 13.5 % — SIGNIFICANT CHANGE UP (ref 10.3–14.5)
SODIUM SERPL-SCNC: 138 MMOL/L — SIGNIFICANT CHANGE UP (ref 135–145)
WBC # BLD: 1.7 K/UL — LOW (ref 3.8–10.5)
WBC # FLD AUTO: 1.7 K/UL — LOW (ref 3.8–10.5)

## 2022-10-20 RX ORDER — HEPARIN SODIUM 5000 [USP'U]/ML
5000 INJECTION INTRAVENOUS; SUBCUTANEOUS EVERY 12 HOURS
Refills: 0 | Status: DISCONTINUED | OUTPATIENT
Start: 2022-10-20 | End: 2022-10-23

## 2022-10-20 RX ADMIN — Medication 100 MILLIGRAM(S): at 05:16

## 2022-10-20 RX ADMIN — PANTOPRAZOLE SODIUM 40 MILLIGRAM(S): 20 TABLET, DELAYED RELEASE ORAL at 05:16

## 2022-10-20 RX ADMIN — Medication 100 MILLIGRAM(S): at 21:09

## 2022-10-20 RX ADMIN — ERYTHROPOIETIN 10000 UNIT(S): 10000 INJECTION, SOLUTION INTRAVENOUS; SUBCUTANEOUS at 17:49

## 2022-10-20 RX ADMIN — CHLORHEXIDINE GLUCONATE 1 APPLICATION(S): 213 SOLUTION TOPICAL at 14:56

## 2022-10-20 RX ADMIN — Medication 100 MILLIGRAM(S): at 14:56

## 2022-10-20 RX ADMIN — PREGABALIN 1000 MICROGRAM(S): 225 CAPSULE ORAL at 14:56

## 2022-10-20 RX ADMIN — AMLODIPINE BESYLATE 5 MILLIGRAM(S): 2.5 TABLET ORAL at 05:16

## 2022-10-20 RX ADMIN — Medication 50 MILLIGRAM(S): at 05:16

## 2022-10-20 NOTE — PROGRESS NOTE ADULT - SUBJECTIVE AND OBJECTIVE BOX
NEPHROLOGY     Patient seen and examined, continues to complain of cough, denies sob at present, on room air, currently in no acute distress.     MEDICATIONS  (STANDING):  amLODIPine   Tablet 5 milliGRAM(s) Oral daily  benzonatate 100 milliGRAM(s) Oral every 8 hours  chlorhexidine 2% Cloths 1 Application(s) Topical daily  cyanocobalamin Injectable 1000 MICROGram(s) IntraMuscular daily  epoetin nydia-epbx (RETACRIT) Injectable 68460 Unit(s) IV Push <User Schedule>  heparin   Injectable 5000 Unit(s) SubCutaneous every 12 hours  isosorbide   mononitrate ER Tablet (IMDUR) 30 milliGRAM(s) Oral daily  metoprolol succinate ER 50 milliGRAM(s) Oral daily  pantoprazole    Tablet 40 milliGRAM(s) Oral before breakfast    VITALS:  T(C): , Max: 36.7 (10-19-22 @ 15:48)  T(F): , Max: 98.1 (10-19-22 @ 15:48)  HR: 69 (10-20-22 @ 05:11)  BP: 101/63 (10-20-22 @ 05:11)  RR: 17 (10-20-22 @ 05:11)  SpO2: 99% (10-20-22 @ 05:11)    PHYSICAL EXAM:  Constitutional: NAD at rest on NCO2  HEENT: NCAT, MMM  Neck:  No JVD  Respiratory: grossly clear b/l  Cardiovascular: reg s1s2  Gastrointestinal: BS+, soft, NT/ND  Extremities: No peripheral edema  Neurological: A/O x 3, no focal deficits  Psychiatric: Normal mood, normal affect  : No Brooks  Skin: No rashes  Access: ADITI SALGUERO (+thrill)     LABS:                        9.6    1.70  )-----------( 78       ( 20 Oct 2022 06:12 )             28.6     10-20    138  |  95<L>  |  62<H>  ----------------------------<  83  4.7   |  25  |  7.88<H>    Ca    10.5      20 Oct 2022 06:12  Phos  6.5     10-20  Mg     2.00     10-20    TPro  6.1  /  Alb  3.2<L>  /  TBili  0.7  /  DBili  x   /  AST  58<H>  /  ALT  24  /  AlkPhos  114  10-19

## 2022-10-20 NOTE — PROGRESS NOTE ADULT - SUBJECTIVE AND OBJECTIVE BOX
EDGAR CLAUDIO  57y  Male      Patient is a 57y old  Male who presents with a chief complaint of Diarrhea (20 Oct 2022 11:58)  comfortable,nad,feels better,no diarrhoea,on room air    REVIEW OF SYSTEMS:  CONSTITUTIONAL: No fever  RESPIRATORY: No cough, hemoptysis or shortness of breath  CARDIOVASCULAR: No chest pain, palpitations, dizziness, or leg swelling  GASTROINTESTINAL: No abdominal pain. nausea, vomiting, hematemesis  GENITOURINARY: No dysuria, frequency, hematuria   NEUROLOGICAL: No headaches, no dizziness  MUSCULOSKELETAL: No joint pain or swelling;     INTERVAL HPI/OVERNIGHT EVENTS:  T(C): 36.9 (10-20-22 @ 21:09), Max: 36.9 (10-20-22 @ 21:09)  HR: 71 (10-20-22 @ 21:09) (69 - 85)  BP: 102/67 (10-20-22 @ 21:09) (100/62 - 108/71)  RR: 18 (10-20-22 @ 21:09) (17 - 19)  SpO2: 93% (10-20-22 @ 21:09) (90% - 99%)  Wt(kg): --  I&O's Summary    20 Oct 2022 07:01  -  20 Oct 2022 21:55  --------------------------------------------------------  IN: 400 mL / OUT: 1900 mL / NET: -1500 mL      T(C): 36.9 (10-20-22 @ 21:09), Max: 36.9 (10-20-22 @ 21:09)  HR: 71 (10-20-22 @ 21:09) (69 - 85)  BP: 102/67 (10-20-22 @ 21:09) (100/62 - 108/71)  RR: 18 (10-20-22 @ 21:09) (17 - 19)  SpO2: 93% (10-20-22 @ 21:09) (90% - 99%)  Wt(kg): --Vital Signs Last 24 Hrs  T(C): 36.9 (20 Oct 2022 21:09), Max: 36.9 (20 Oct 2022 21:09)  T(F): 98.4 (20 Oct 2022 21:09), Max: 98.4 (20 Oct 2022 21:09)  HR: 71 (20 Oct 2022 21:09) (69 - 85)  BP: 102/67 (20 Oct 2022 21:09) (100/62 - 108/71)  BP(mean): --  RR: 18 (20 Oct 2022 21:09) (17 - 19)  SpO2: 93% (20 Oct 2022 21:09) (90% - 99%)    Parameters below as of 20 Oct 2022 21:09  Patient On (Oxygen Delivery Method): room air        LABS:                        9.6    1.70  )-----------( 78       ( 20 Oct 2022 06:12 )             28.6     10-20    138  |  95<L>  |  62<H>  ----------------------------<  83  4.7   |  25  |  7.88<H>    Ca    10.5      20 Oct 2022 06:12  Phos  6.5     10-20  Mg     2.00     10-20    TPro  6.1  /  Alb  3.2<L>  /  TBili  0.7  /  DBili  x   /  AST  58<H>  /  ALT  24  /  AlkPhos  114  10-19        CAPILLARY BLOOD GLUCOSE                PAST MEDICAL & SURGICAL HISTORY:  HTN (hypertension)      End stage renal disease on dialysis      Coronary artery disease      Polycystic kidney      Polycystic liver disease      Hepatitis C      AV fistula      Stented coronary artery      Coronary artery disease involving other coronary artery bypass graft          MEDICATIONS  (STANDING):  amLODIPine   Tablet 5 milliGRAM(s) Oral daily  benzonatate 100 milliGRAM(s) Oral every 8 hours  chlorhexidine 2% Cloths 1 Application(s) Topical daily  cyanocobalamin Injectable 1000 MICROGram(s) IntraMuscular daily  epoetin nydia-epbx (RETACRIT) Injectable 63193 Unit(s) IV Push <User Schedule>  heparin   Injectable 5000 Unit(s) SubCutaneous every 12 hours  isosorbide   mononitrate ER Tablet (IMDUR) 30 milliGRAM(s) Oral daily  metoprolol succinate ER 50 milliGRAM(s) Oral daily  pantoprazole    Tablet 40 milliGRAM(s) Oral before breakfast    MEDICATIONS  (PRN):  acetaminophen     Tablet .. 650 milliGRAM(s) Oral every 6 hours PRN Temp greater or equal to 38C (100.4F), Mild Pain (1 - 3)  ALBUTerol    90 MICROgram(s) HFA Inhaler 2 Puff(s) Inhalation every 6 hours PRN Bronchospasm  aluminum hydroxide/magnesium hydroxide/simethicone Suspension 30 milliLiter(s) Oral every 4 hours PRN Dyspepsia  guaiFENesin Oral Liquid (Sugar-Free) 100 milliGRAM(s) Oral every 6 hours PRN Cough  hydrocodone/homatropine Syrup 5 milliLiter(s) Oral every 8 hours PRN severe cough  melatonin 3 milliGRAM(s) Oral at bedtime PRN Insomnia  ondansetron Injectable 4 milliGRAM(s) IV Push every 8 hours PRN Nausea and/or Vomiting  sodium chloride 0.65% Nasal 1 Spray(s) Both Nostrils three times a day PRN Nasal Congestion        RADIOLOGY & ADDITIONAL TESTS:    Imaging Personally Reviewed:  [ ] YES  [ ] NO    Consultant(s) Notes Reviewed:  [ x] YES  [ ] NO    PHYSICAL EXAM:  GENERAL: Alert and awake lying in bed in no distress  HEAD:  Atraumatic, Normocephalic  EYES: EOMI, ARA, conjunctiva and sclera clear  NECK: Supple, No JVD, Normal thyroid  NERVOUS SYSTEM:  Alert & Oriented X3, Motor and sensory systems are intact,   CHEST/LUNG: Bilateral clear breath sounds, no rhochi, no wheezing, no crepitations,  HEART: Regular rate and rhythm; No murmurs, rubs, or gallops  ABDOMEN: Soft, Nontender, Nondistended; Bowel sounds present  EXTREMITIES:   Peripheral Pulses are palpable, no  edema        Care Discussed with Consultants/Other Providers [ ] YES  [ ] NO      Code Status: [] Full Code [] DNR [] DNI [] Goals of Care:   Disposition: [] ICU [] Stroke Unit [] RCU []PCU []Floor [] Discharge Home         LIA EvansFACP

## 2022-10-20 NOTE — PROGRESS NOTE ADULT - ASSESSMENT
ASSESSMENT: 57M w/ [id number], HTN, and CAD, 10/16/22 p/w COVID/diarrhea/fever     (1)Renal -  ESRD-HD - due for HD today   (2)CV - hemodynamically stable  (3)COVID - s/p Remdesivir    RECOMMENDATIONS:  (1)HD today- 2.5 kg UF as able  (2)Retacrit with HD     MADELEINE Rahman  [facility]  [phone]      ASSESSMENT: 57M w/ [id number], HTN, and CAD, 10/16/22 p/w COVID/diarrhea/fever     (1)Renal -  ESRD-HD - due for HD today   (2)CV - hemodynamically stable  (3)COVID - s/p Remdesivir    RECOMMENDATIONS:  (1)HD today- 2.5 kg UF as able  (2)Retacrit with HD     EDUARDO RahmanC  [facility]  [phone]       \RENAL ATTENDING NOTE  Patient seen and examined on HD. Agree with assessment and plan as above.    Simon Andersen MD  [facility]  [phone]

## 2022-10-21 ENCOUNTER — TRANSCRIPTION ENCOUNTER (OUTPATIENT)
Age: 57
End: 2022-10-21

## 2022-10-21 LAB
ANION GAP SERPL CALC-SCNC: 12 MMOL/L — SIGNIFICANT CHANGE UP (ref 7–14)
BASOPHILS # BLD AUTO: 0.02 K/UL — SIGNIFICANT CHANGE UP (ref 0–0.2)
BASOPHILS NFR BLD AUTO: 0.9 % — SIGNIFICANT CHANGE UP (ref 0–2)
BUN SERPL-MCNC: 42 MG/DL — HIGH (ref 7–23)
CALCIUM SERPL-MCNC: 10.2 MG/DL — SIGNIFICANT CHANGE UP (ref 8.4–10.5)
CHLORIDE SERPL-SCNC: 94 MMOL/L — LOW (ref 98–107)
CO2 SERPL-SCNC: 30 MMOL/L — SIGNIFICANT CHANGE UP (ref 22–31)
CREAT SERPL-MCNC: 5.61 MG/DL — HIGH (ref 0.5–1.3)
EGFR: 11 ML/MIN/1.73M2 — LOW
EOSINOPHIL # BLD AUTO: 0 K/UL — SIGNIFICANT CHANGE UP (ref 0–0.5)
EOSINOPHIL NFR BLD AUTO: 0 % — SIGNIFICANT CHANGE UP (ref 0–6)
GLUCOSE SERPL-MCNC: 82 MG/DL — SIGNIFICANT CHANGE UP (ref 70–99)
HCT VFR BLD CALC: 29.7 % — LOW (ref 39–50)
HGB BLD-MCNC: 9.8 G/DL — LOW (ref 13–17)
IANC: 0.91 K/UL — LOW (ref 1.8–7.4)
INTERPRETATION 24H UR IFE-IMP: SIGNIFICANT CHANGE UP
LYMPHOCYTES # BLD AUTO: 0.32 K/UL — LOW (ref 1–3.3)
LYMPHOCYTES # BLD AUTO: 17.4 % — SIGNIFICANT CHANGE UP (ref 13–44)
MAGNESIUM SERPL-MCNC: 1.9 MG/DL — SIGNIFICANT CHANGE UP (ref 1.6–2.6)
MCHC RBC-ENTMCNC: 30.9 PG — SIGNIFICANT CHANGE UP (ref 27–34)
MCHC RBC-ENTMCNC: 33 GM/DL — SIGNIFICANT CHANGE UP (ref 32–36)
MCV RBC AUTO: 93.7 FL — SIGNIFICANT CHANGE UP (ref 80–100)
MONOCYTES # BLD AUTO: 0.32 K/UL — SIGNIFICANT CHANGE UP (ref 0–0.9)
MONOCYTES NFR BLD AUTO: 17.4 % — HIGH (ref 2–14)
NEUTROPHILS # BLD AUTO: 1.11 K/UL — LOW (ref 1.8–7.4)
NEUTROPHILS NFR BLD AUTO: 57.8 % — SIGNIFICANT CHANGE UP (ref 43–77)
PHOSPHATE SERPL-MCNC: 5.2 MG/DL — HIGH (ref 2.5–4.5)
PLATELET # BLD AUTO: 101 K/UL — LOW (ref 150–400)
POTASSIUM SERPL-MCNC: 4 MMOL/L — SIGNIFICANT CHANGE UP (ref 3.5–5.3)
POTASSIUM SERPL-SCNC: 4 MMOL/L — SIGNIFICANT CHANGE UP (ref 3.5–5.3)
RBC # BLD: 3.17 M/UL — LOW (ref 4.2–5.8)
RBC # FLD: 13.7 % — SIGNIFICANT CHANGE UP (ref 10.3–14.5)
SODIUM SERPL-SCNC: 136 MMOL/L — SIGNIFICANT CHANGE UP (ref 135–145)
WBC # BLD: 1.86 K/UL — LOW (ref 3.8–10.5)
WBC # FLD AUTO: 1.86 K/UL — LOW (ref 3.8–10.5)

## 2022-10-21 PROCEDURE — 99232 SBSQ HOSP IP/OBS MODERATE 35: CPT

## 2022-10-21 RX ORDER — REMDESIVIR 5 MG/ML
100 INJECTION INTRAVENOUS EVERY 24 HOURS
Refills: 0 | Status: COMPLETED | OUTPATIENT
Start: 2022-10-21 | End: 2022-10-22

## 2022-10-21 RX ADMIN — REMDESIVIR 500 MILLIGRAM(S): 5 INJECTION INTRAVENOUS at 17:12

## 2022-10-21 RX ADMIN — CHLORHEXIDINE GLUCONATE 1 APPLICATION(S): 213 SOLUTION TOPICAL at 14:27

## 2022-10-21 RX ADMIN — PREGABALIN 1000 MICROGRAM(S): 225 CAPSULE ORAL at 14:27

## 2022-10-21 RX ADMIN — Medication 100 MILLIGRAM(S): at 14:26

## 2022-10-21 RX ADMIN — HEPARIN SODIUM 5000 UNIT(S): 5000 INJECTION INTRAVENOUS; SUBCUTANEOUS at 17:12

## 2022-10-21 RX ADMIN — HEPARIN SODIUM 5000 UNIT(S): 5000 INJECTION INTRAVENOUS; SUBCUTANEOUS at 06:05

## 2022-10-21 RX ADMIN — Medication 100 MILLIGRAM(S): at 06:05

## 2022-10-21 RX ADMIN — PANTOPRAZOLE SODIUM 40 MILLIGRAM(S): 20 TABLET, DELAYED RELEASE ORAL at 06:05

## 2022-10-21 NOTE — PROGRESS NOTE ADULT - ASSESSMENT
Mr. Lee is a 58 yo M with a PMH of ESRD on T/Th/S dialysis 2/2 PKD, CAD s/p stents presented 10/15 with 3 days of watery diarrhea and fatigue.  He got his dialysis on Friday 10/14 however his nephrologist felt he was getting too weak and sent him into the hospital. He had COVID on Oct 5 with a cough and later tested positive at [facility].   In the ED 10/15, febrile to 101.6, Wbc 2.2, Hgb 10 (at baseline). Plt 72. COVID +. CXR without consolidations. ID consulted for the same.     WORKUP  CXR: CLear  RVP: COVID 19 +    DIAGNOSIS and IMPRESSION  ·	Fever due to covid- trending down  last fever 10/18  ·	Pancytopenia likely due to covid - improving  ·	COVID infection - desaturated while ambulating on RA today                   ·	ESRD on HD  ·	diarrhea due to covid improved    Tested COVID 19 positive on 10/5  Symptoms: Diarrhea, Cough  Unvaccinated  Diarrhea and pancytopenia can be see in COVID   Hep C positive; viral load not detected    RECOMMENDATIONS    - agree with extending  remdesevir x 5 days  - Continue supplemental O2 and supportive care per primary team  - Continue Contact and Airborne Isolation precautions  - C/W following inflammatory markers Q72hrs - ESR; CRP; Ferritin; LDH; d-dimer      ID service available over weekend

## 2022-10-21 NOTE — DISCHARGE NOTE PROVIDER - PROVIDER TOKENS
PROVIDER:[TOKEN:[[id number]],FOLLOWUP:[1 week]],FREE:[LAST:[Your nephrologist],PHONE:[(   )    -],FAX:[(   )    -],FOLLOWUP:[1 week]]

## 2022-10-21 NOTE — DISCHARGE NOTE NURSING/CASE MANAGEMENT/SOCIAL WORK - NSSCTYPOFSERV_GEN_ALL_CORE
Accepted for SOC 10/22/2022 RN Consent (Temporal Branch)/Introductory Paragraph: The rationale for Mohs was explained to the patient and consent was obtained. The risks, benefits and alternatives to therapy were discussed in detail. Specifically, the risks of damage to the temporal branch of the facial nerve, infection, scarring, bleeding, prolonged wound healing, incomplete removal, allergy to anesthesia, and recurrence were addressed. Prior to the procedure, the treatment site was clearly identified and confirmed by the patient. All components of Universal Protocol/PAUSE Rule completed.

## 2022-10-21 NOTE — DISCHARGE NOTE PROVIDER - NSDCMRMEDTOKEN_GEN_ALL_CORE_FT
amLODIPine 5 mg oral tablet: 1 tab(s) orally once a day  aspirin 81 mg oral delayed release tablet: 1 tab(s) orally once a day  Auryxia 210 mg oral tablet: 1 tab(s) orally once a day  isosorbide mononitrate 30 mg oral tablet, extended release: 1 tab(s) orally once a day (in the morning)  metoprolol succinate 50 mg oral tablet, extended release: 1 tab(s) orally once a day  omeprazole 40 mg oral delayed release capsule: 1 cap(s) orally once a day   amLODIPine 5 mg oral tablet: 1 tab(s) orally once a day  aspirin 81 mg oral delayed release tablet: 1 tab(s) orally once a day  Auryxia 210 mg oral tablet: 1 tab(s) orally once a day  cyanocobalamin 1000 mcg oral tablet: 1 tab(s) orally once a day  epoetin nydia: 64691 unit(s) intravenous Tuesday, Thursday, Saturday at 8 AM intra dialysis  isosorbide mononitrate 30 mg oral tablet, extended release: 1 tab(s) orally once a day (in the morning)  metoprolol succinate 50 mg oral tablet, extended release: 1 tab(s) orally once a day  omeprazole 40 mg oral delayed release capsule: 1 cap(s) orally once a day

## 2022-10-21 NOTE — PROGRESS NOTE ADULT - SUBJECTIVE AND OBJECTIVE BOX
Follow Up:  covid+  pancytopenia  diarrhea    Interval History/ROS:   not coughing as much  saturating well on 2L at rest  desaturated on RA with ambulating    Allergies  No Known Allergies        ANTIMICROBIALS:  remdesivir  IVPB 100 every 24 hours    MEDICATIONS  (STANDING):  acetaminophen     Tablet .. 650 every 6 hours PRN  ALBUTerol    90 MICROgram(s) HFA Inhaler 2 every 6 hours PRN  aluminum hydroxide/magnesium hydroxide/simethicone Suspension 30 every 4 hours PRN  amLODIPine   Tablet 5 daily  epoetin nydia-epbx (RETACRIT) Injectable 36606 <User Schedule>  guaiFENesin Oral Liquid (Sugar-Free) 100 every 6 hours PRN  heparin   Injectable 5000 every 12 hours  hydrocodone/homatropine Syrup 5 every 8 hours PRN  isosorbide   mononitrate ER Tablet (IMDUR) 30 daily  melatonin 3 at bedtime PRN  metoprolol succinate ER 50 daily  ondansetron Injectable 4 every 8 hours PRN  pantoprazole    Tablet 40 before breakfast    Vital Signs Last 24 Hrs  T(F): 97.3 (10-21-22 @ 14:25), Max: 98.4 (10-20-22 @ 21:09)  HR: 75 (10-21-22 @ 14:25)  BP: 95/70 (10-21-22 @ 14:25)  RR: 17 (10-21-22 @ 14:25)  SpO2: 94% (10-21-22 @ 14:25) (87% - 98%)        PHYSICAL EXAM:  Constitutional: sleeping, no distress  RS: no distress 2L                          9.8    1.86  )-----------( 101      ( 21 Oct 2022 05:30 )             29.7 10-21    136  |  94  |  42  ----------------------------<  82  4.0   |  30  |  5.61  Ca    10.2      21 Oct 2022 05:30Phos  5.2     10-21Mg     1.90     10-21        MICROBIOLOGY:    Hepatitis C Virus RNA Detection by PCR (10.17.22 @ 05:20)   Hepatitis C RNA, Interpretation: NotDetec: Results Interpretation   Not Detected HCV RNA not detectedHepatitis C Antibody Test (10.15.22 @ 21:40)   Hepatitis C Virus S/CO Ratio: 14.96 S/CO   Hepatitis C Virus Interpretation: Reactive:           RADIOLOGY:    < from: Xray Chest 1 View- PORTABLE-Urgent (Xray Chest 1 View- PORTABLE-Urgent .) (10.15.22 @ 01:52) >    ACC: [id number] EXAM:  XR CHEST PORTABLE URGENT 1V                          PROCEDURE DATE:  10/15/2022          INTERPRETATION:  CLINICAL INDICATION:  covid +, end-stage renal disease   on dialysis    COMPARISON: Chest radiograph dated 3/2/2016    TECHNIQUE: Frontal radiograph of the chest.    FINDINGS:    Cardiac/mediastinum/hilum: Median sternotomy wires and surgical clips.   Heart size cannot be accurately assessed on this projection.    Lung parenchyma/Pleura: The lungs are clear. There is no pleural   effusion. There is no pneumothorax.    Skeleton/soft tissues: No acute osseous abnormalities.    IMPRESSION:    No focal consolidations.    --- End of Report ---          LIA RUIZ MD; Resident Radiologist  This document has been electronically signed.  EULALIA REYNOLDS MD; Attending Radiologist  This document has been electronically signed. Oct 15 2022  8:06AM    < end of copied text >

## 2022-10-21 NOTE — DISCHARGE NOTE PROVIDER - NSDCFUSCHEDAPPT_GEN_ALL_CORE_FT
José Luis Garcia  [facility] Physician [facility]  HEPATOLOGY [address]  Scheduled Appointment: 11/03/2022

## 2022-10-21 NOTE — CHART NOTE - NSCHARTNOTEFT_GEN_A_CORE
Discussed case with Dr. Turner, patient received 3 days of Remdesivir. Will order 2 more days of Remdesivir to complete 5 day course. Pulmonology to see patient 10/22.
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 Hematology consulted due to dropping leukocyte count and platelets.    Platelet count is recovering and ANC has been rising.   Haptoglobin was normal  B12 was low normal and folate was normal. Patient should continue B12 supplementation.   SPEP found normal electrophoresis pattern.   Patient should follow up with PMD in 1-2 weeks to recheck CBC and B12 levels.     Hematology team signing off.  Please do not hesitate to page with questions.     Melinda Hudson MD PGY4   [phone]  Hematology-Oncology Fellow  WEEKENDS- Please call  to page on-call fellow
Patient with diagnosis of COVID-19. On room air upon ambulation patient with SPO2 desaturation of 87%. SPO2 improved to 98% on 2L NC while ambulating. patient qualifies for home oxygen.

## 2022-10-21 NOTE — DISCHARGE NOTE PROVIDER - CARE PROVIDER_API CALL
Christian Turner)  Internal Medicine  [address] [place] [address]  Phone: [phone]  Fax: [phone]  Follow Up Time: 1 week    Your nephrologist,   Phone: (   )    -  Fax: (   )    -  Follow Up Time: 1 week

## 2022-10-21 NOTE — DISCHARGE NOTE PROVIDER - HOSPITAL COURSE
56 yo M with h/o ESRD on T/Th/S dialysis 2/2 PKD, CAD s/p stents p/w 3 days of watery diarrhea up to 4x/day. He has been feeling weak because of that. He did get his dialysis on Friday however his nephrologist felt he was getting too weak and sent him into the hospital. He had COVID on Oct 5/6 with a cough and later tested positive at [facility]. His symptoms progressed over the past few days and now having diarrhea.    Diarrhea due to severe acute respiratory syndrome coronavirus 2 (SARS-CoV-2) infection.   - Hx consistent with viral acute gastroenteritis. No recent abx but he has been in and out of facilities to suggest C Diff  - Will hold further IVF as he is not having active diarrhea at this time  - GI PCR (no more diarrhea)    Thrombocytopenia  - Unsure etiology. Does not drink ETOH per patient  - pt had thrombocytopenia in the past but not to this severity  - Heme-  COVID-related cytopenia. Supportive care  - Pancytopenia likely 2/2 acute viral illness. No bleeding, abdominal pain, easy bruising  - Infectious w/u procal elevated, Hep C positive, HIV neg, CXR without consolidations  - resumed heparin 10/20, heme signed off 10/21    2019 novel coronavirus disease (COVID-19)  - Currently not hypoxic. No treatment required  - DDimer-279, Procal -1.0 high  - ID consulted -Trend CBC with Diff, Hep C RNA PCR  - ID recs Remdesevir 3 days started 10/16-10/18  - Continue Contact and Airborne Isolation precautions  - Extended Remdesivir to compelte total 5 day course    ESRD   - Nephro consulted - pt on T/T/S, had HD on 10/15  - c/w HD outpatient    CAD   - No chest pain  - ASA d/jaime on 10/16 2/2 thrombocytopenia    HTN   - Controlled  - Continue home medications.    Anemia.   - Stable. Likely in setting of ESRD  - No Auryxia inpatient, resume outpatient  - HIV -neg, Hep C -reactive, f/u Hep C RNA    Discussed case with  _____________________ on ________________________, patient medically stable for discharge to ___________________.   56 yo M with h/o ESRD on T/Th/S dialysis 2/2 PKD, CAD s/p stents p/w 3 days of watery diarrhea up to 4x/day. He has been feeling weak because of that. He did get his dialysis on Friday however his nephrologist felt he was getting too weak and sent him into the hospital. He had COVID on Oct 5/6 with a cough and later tested positive at [facility]. His symptoms progressed over the past few days and now having diarrhea.    Diarrhea due to severe acute respiratory syndrome coronavirus 2 (SARS-CoV-2) infection.   - Hx consistent with viral acute gastroenteritis. No recent abx but he has been in and out of facilities to suggest C Diff  - Will hold further IVF as he is not having active diarrhea at this time  - GI PCR (no more diarrhea)    Thrombocytopenia  - Unsure etiology. Does not drink ETOH per patient  - pt had thrombocytopenia in the past but not to this severity  - Heme-  COVID-related cytopenia. Supportive care  - Pancytopenia likely 2/2 acute viral illness. No bleeding, abdominal pain, easy bruising  - Infectious w/u procal elevated, Hep C positive, HIV neg, CXR without consolidations  - resumed heparin 10/20, heme signed off 10/21    2019 novel coronavirus disease (COVID-19)  - Currently not hypoxic. No treatment required  - DDimer-279, Procal -1.0 high  - ID consulted -Trend CBC with Diff, Hep C RNA PCR  - ID recs Remdesevir 3 days started 10/16-10/18  - Continue Contact and Airborne Isolation precautions  - Extended Remdesivir to compelte total 5 day course    ESRD   - Nephro consulted - pt on T/T/S, had HD on 10/15  - c/w HD outpatient    CAD   - No chest pain  - ASA d/jaime on 10/16 2/2 thrombocytopenia    HTN   - Controlled  - Continue home medications.    Anemia.   - Stable. Likely in setting of ESRD  - No Auryxia inpatient, resume outpatient  - HIV -neg, Hep C -reactive, f/u Hep C RNA    On 10/23/22 this case was reviewed with Dr. Evans, the patient is medically stable and optimized for discharge. All medications were reviewed and prescriptions were sent to mutually agreed upon pharmacy.

## 2022-10-21 NOTE — PROGRESS NOTE ADULT - ASSESSMENT
ASSESSMENT: 57M w/ [id number]-LX, HTN, and CAD, 10/16/22 p/w COVID/diarrhea/fever     (1)Renal -  ESRD-HD - s/p HD yesterday, due tomorrow   (2)CV - hemodynamically stable  (3)COVID - s/p Remdesivir    RECOMMENDATIONS:  (1)HD tomorrow (inpatient vs outpatient) - 1.5 kg UF as able  (2)Retacrit with HD     MADELEINE Rahman  [facility]  [phone]      ASSESSMENT: 57M w/ [id number], HTN, and CAD, 10/16/22 p/w COVID/diarrhea/fever     (1)Renal -  ESRD-HD - s/p HD yesterday, due tomorrow   (2)CV - hemodynamically stable  (3)COVID - s/p Remdesivir    RECOMMENDATIONS:  (1)HD tomorrow (inpatient vs outpatient) - 1.5 kg UF as able  (2)Retacrit with HD     Liset Kumari NP-C  [facility]  [phone]       RENAL ATTENDING NOTE  Patient seen and examined with NP. Agree with assessment and plan as above.    Simon Andersen MD  [facility]  [phone]

## 2022-10-21 NOTE — DISCHARGE NOTE NURSING/CASE MANAGEMENT/SOCIAL WORK - NSDCPEFALRISK_GEN_ALL_CORE
For information on Fall & Injury Prevention, visit: [email or web address] OR  [email or web address] OR  https://www.cdc.gov/steadi/patient.html

## 2022-10-21 NOTE — PROGRESS NOTE ADULT - SUBJECTIVE AND OBJECTIVE BOX
NEPHROLOGY     Patient seen and examined sitting on bed, reports feeling alittle better, cough and breathing improving, denies sob, comfortable on 2L NC, denies pain,  currently in no acute distress. HD yesterday removed 1.5 L, unable to remove ordered UF goal due to hypotension.     MEDICATIONS  (STANDING):  amLODIPine   Tablet 5 milliGRAM(s) Oral daily  benzonatate 100 milliGRAM(s) Oral every 8 hours  chlorhexidine 2% Cloths 1 Application(s) Topical daily  cyanocobalamin Injectable 1000 MICROGram(s) IntraMuscular daily  epoetin nydia-epbx (RETACRIT) Injectable 18522 Unit(s) IV Push <User Schedule>  heparin   Injectable 5000 Unit(s) SubCutaneous every 12 hours  isosorbide   mononitrate ER Tablet (IMDUR) 30 milliGRAM(s) Oral daily  metoprolol succinate ER 50 milliGRAM(s) Oral daily  pantoprazole    Tablet 40 milliGRAM(s) Oral before breakfast    VITALS:  T(C): , Max: 36.9 (10-20-22 @ 21:09)  T(F): , Max: 98.4 (10-20-22 @ 21:09)  HR: 79 (10-21-22 @ 06:05)  BP: 97/62 (10-21-22 @ 06:05)  RR: 19 (10-21-22 @ 11:09)  SpO2: 98% (10-21-22 @ 11:09)    I and O's:    10-20 @ 07:01  -  10-21 @ 07:00  --------------------------------------------------------  IN: 400 mL / OUT: 1900 mL / NET: -1500 mL    PHYSICAL EXAM:  Constitutional: NAD at rest on NCO2  HEENT: NCAT, MMM  Neck:  No JVD  Respiratory: grossly clear b/l  Cardiovascular: reg s1s2  Gastrointestinal: BS+, soft, NT/ND  Extremities: No peripheral edema  Neurological: A/O x 3, no focal deficits  Psychiatric: Normal mood, normal affect  : No Brooks  Skin: No rashes  Access: LUE AVF (+thrill)     LABS:                        9.8    1.86  )-----------( 101      ( 21 Oct 2022 05:30 )             29.7     10-21    136  |  94<L>  |  42<H>  ----------------------------<  82  4.0   |  30  |  5.61<H>    Ca    10.2      21 Oct 2022 05:30  Phos  5.2     10-21  Mg     1.90     10-21

## 2022-10-21 NOTE — DISCHARGE NOTE PROVIDER - NSDCCPCAREPLAN_GEN_ALL_CORE_FT
PRINCIPAL DISCHARGE DIAGNOSIS  Diagnosis: 2019 novel coronavirus disease (COVID-19)  Assessment and Plan of Treatment: You had diarrhea due to COVID. Your diarrhea resolved and no further testing was needed. You finish your quarantine period tomorrow 10/24.  Please follow up with your primary care physician after discharge for continued monitoring and management.      SECONDARY DISCHARGE DIAGNOSES  Diagnosis: ESRD on dialysis  Assessment and Plan of Treatment: Please continue to follow your dialysis schedule and refer to your primary provider/nephrologist for further care/recommendations. Continue your medications and supplementation as directed.      Diagnosis: Thrombocytopenia  Assessment and Plan of Treatment: You were found with a low platelet count. Hematology was consulted who believed it was due to COVID. You were resumed of prophylactic blood thinners with no further issues. Please follow up with your primary care physician after discharge for continued monitoring and management.

## 2022-10-21 NOTE — DISCHARGE NOTE NURSING/CASE MANAGEMENT/SOCIAL WORK - PATIENT PORTAL LINK FT
You can access the FollowMyHealth Patient Portal offered by [facility] by registering at the following website: [email or web address]. By joining [employer]’s FollowMyHealth portal, you will also be able to view your health information using other applications (apps) compatible with our system.

## 2022-10-21 NOTE — PROGRESS NOTE ADULT - SUBJECTIVE AND OBJECTIVE BOX
EDGAR CLAUDIO  57y  Male  10/21/2022    Patient is a 57y old  Male who presents with a chief complaint of Diarrhea (21 Oct 2022 11:58)  comfortable,nad,feels better,no diarrhoea,on room air  No new symptoms reported  REVIEW OF SYSTEMS:  CONSTITUTIONAL: No fever  RESPIRATORY: No cough, hemoptysis or shortness of breath  CARDIOVASCULAR: No chest pain, palpitations, dizziness, or leg swelling  GASTROINTESTINAL: No abdominal pain. nausea, vomiting, hematemesis  GENITOURINARY: No dysuria, frequency, hematuria   NEUROLOGICAL: No headaches, no dizziness  MUSCULOSKELETAL: No joint pain or swelling;     INTERVAL HPI/OVERNIGHT EVENTS:  T(C): 36.9 (10-20-22 @ 21:09), Max: 36.9 (10-20-22 @ 21:09)  HR: 71 (10-20-22 @ 21:09) (69 - 85)  BP: 102/67 (10-20-22 @ 21:09) (100/62 - 108/71)  RR: 18 (10-20-22 @ 21:09) (17 - 19)  SpO2: 93% (10-20-22 @ 21:09) (90% - 99%)  Wt(kg): --  I&O's Summary    20 Oct 2022 07:01  -  20 Oct 2022 21:55  --------------------------------------------------------  IN: 400 mL / OUT: 1900 mL / NET: -1500 mL      T(C): 36.9 (10-20-22 @ 21:09), Max: 36.9 (10-20-22 @ 21:09)  HR: 71 (10-20-22 @ 21:09) (69 - 85)  BP: 102/67 (10-20-22 @ 21:09) (100/62 - 108/71)  RR: 18 (10-20-22 @ 21:09) (17 - 19)  SpO2: 93% (10-20-22 @ 21:09) (90% - 99%)  Wt(kg): --Vital Signs Last 24 Hrs  T(C): 36.9 (20 Oct 2022 21:09), Max: 36.9 (20 Oct 2022 21:09)  T(F): 98.4 (20 Oct 2022 21:09), Max: 98.4 (20 Oct 2022 21:09)  HR: 71 (20 Oct 2022 21:09) (69 - 85)  BP: 102/67 (20 Oct 2022 21:09) (100/62 - 108/71)  BP(mean): --  RR: 18 (20 Oct 2022 21:09) (17 - 19)  SpO2: 93% (20 Oct 2022 21:09) (90% - 99%)    Parameters below as of 20 Oct 2022 21:09  Patient On (Oxygen Delivery Method): room air        LABS:                        9.6    1.70  )-----------( 78       ( 20 Oct 2022 06:12 )             28.6     10-20    138  |  95<L>  |  62<H>  ----------------------------<  83  4.7   |  25  |  7.88<H>    Ca    10.5      20 Oct 2022 06:12  Phos  6.5     10-20  Mg     2.00     10-20    TPro  6.1  /  Alb  3.2<L>  /  TBili  0.7  /  DBili  x   /  AST  58<H>  /  ALT  24  /  AlkPhos  114  10-19        CAPILLARY BLOOD GLUCOSE                PAST MEDICAL & SURGICAL HISTORY:  HTN (hypertension)      End stage renal disease on dialysis      Coronary artery disease      Polycystic kidney      Polycystic liver disease      Hepatitis C      AV fistula      Stented coronary artery      Coronary artery disease involving other coronary artery bypass graft          MEDICATIONS  (STANDING):  amLODIPine   Tablet 5 milliGRAM(s) Oral daily  benzonatate 100 milliGRAM(s) Oral every 8 hours  chlorhexidine 2% Cloths 1 Application(s) Topical daily  cyanocobalamin Injectable 1000 MICROGram(s) IntraMuscular daily  epoetin nydia-epbx (RETACRIT) Injectable 16803 Unit(s) IV Push <User Schedule>  heparin   Injectable 5000 Unit(s) SubCutaneous every 12 hours  isosorbide   mononitrate ER Tablet (IMDUR) 30 milliGRAM(s) Oral daily  metoprolol succinate ER 50 milliGRAM(s) Oral daily  pantoprazole    Tablet 40 milliGRAM(s) Oral before breakfast    MEDICATIONS  (PRN):  acetaminophen     Tablet .. 650 milliGRAM(s) Oral every 6 hours PRN Temp greater or equal to 38C (100.4F), Mild Pain (1 - 3)  ALBUTerol    90 MICROgram(s) HFA Inhaler 2 Puff(s) Inhalation every 6 hours PRN Bronchospasm  aluminum hydroxide/magnesium hydroxide/simethicone Suspension 30 milliLiter(s) Oral every 4 hours PRN Dyspepsia  guaiFENesin Oral Liquid (Sugar-Free) 100 milliGRAM(s) Oral every 6 hours PRN Cough  hydrocodone/homatropine Syrup 5 milliLiter(s) Oral every 8 hours PRN severe cough  melatonin 3 milliGRAM(s) Oral at bedtime PRN Insomnia  ondansetron Injectable 4 milliGRAM(s) IV Push every 8 hours PRN Nausea and/or Vomiting  sodium chloride 0.65% Nasal 1 Spray(s) Both Nostrils three times a day PRN Nasal Congestion        RADIOLOGY & ADDITIONAL TESTS:    Imaging Personally Reviewed:  [ ] YES  [ ] NO    Consultant(s) Notes Reviewed:  [ x] YES  [ ] NO    PHYSICAL EXAM:  GENERAL: Alert and awake lying in bed in no distress  HEAD:  Atraumatic, Normocephalic  EYES: EOMI, ARA, conjunctiva and sclera clear  NECK: Supple, No JVD, Normal thyroid  NERVOUS SYSTEM:  Alert & Oriented X3, Motor and sensory systems are intact,   CHEST/LUNG: Bilateral clear breath sounds, no rhochi, no wheezing, no crepitations,  HEART: Regular rate and rhythm; No murmurs, rubs, or gallops  ABDOMEN: Soft, Nontender, Nondistended; Bowel sounds present  EXTREMITIES:   Peripheral Pulses are palpable, no  edema        Care Discussed with Consultants/Other Providers [ ] YES  [ ] NO      Code Status: [] Full Code [] DNR [] DNI [] Goals of Care:   Disposition: [] ICU [] Stroke Unit [] RCU []PCU []Floor [] Discharge Home         ALFRED EvansP     Problem: Communication  Goal: The ability to communicate needs accurately and effectively will improve  Outcome: PROGRESSING AS EXPECTED  Reviewed plan of care with patient who verbalized understanding.    Problem: Altered physiologic condition related to postoperative  delivery  Goal: Patient physiologically stable as evidenced by normal lochia, palpable uterine involution and vital signs within normal limits  Outcome: PROGRESSING AS EXPECTED  Fundus firm.  Lochia scant.  VSS.    Problem: Potential for postpartum infection related to surgical incision, compromised uterine condition, urinary tract or respiratory compromise  Goal: Patient will be afebrile and free from signs and symptoms of infection  Outcome: PROGRESSING AS EXPECTED  Patient is afebrile.  Incision approximated.  No redness or drainage noted.

## 2022-10-22 LAB
ANION GAP SERPL CALC-SCNC: 13 MMOL/L — SIGNIFICANT CHANGE UP (ref 7–14)
BASOPHILS # BLD AUTO: 0.01 K/UL — SIGNIFICANT CHANGE UP (ref 0–0.2)
BASOPHILS NFR BLD AUTO: 0.5 % — SIGNIFICANT CHANGE UP (ref 0–2)
BUN SERPL-MCNC: 54 MG/DL — HIGH (ref 7–23)
CALCIUM SERPL-MCNC: 10.3 MG/DL — SIGNIFICANT CHANGE UP (ref 8.4–10.5)
CHLORIDE SERPL-SCNC: 95 MMOL/L — LOW (ref 98–107)
CO2 SERPL-SCNC: 29 MMOL/L — SIGNIFICANT CHANGE UP (ref 22–31)
CREAT SERPL-MCNC: 7.56 MG/DL — HIGH (ref 0.5–1.3)
EGFR: 8 ML/MIN/1.73M2 — LOW
EOSINOPHIL # BLD AUTO: 0.05 K/UL — SIGNIFICANT CHANGE UP (ref 0–0.5)
EOSINOPHIL NFR BLD AUTO: 2.3 % — SIGNIFICANT CHANGE UP (ref 0–6)
GLUCOSE SERPL-MCNC: 95 MG/DL — SIGNIFICANT CHANGE UP (ref 70–99)
HCT VFR BLD CALC: 31.3 % — LOW (ref 39–50)
HGB BLD-MCNC: 10.1 G/DL — LOW (ref 13–17)
IANC: 1 K/UL — LOW (ref 1.8–7.4)
IMM GRANULOCYTES NFR BLD AUTO: 0.5 % — SIGNIFICANT CHANGE UP (ref 0–0.9)
LYMPHOCYTES # BLD AUTO: 0.66 K/UL — LOW (ref 1–3.3)
LYMPHOCYTES # BLD AUTO: 30.8 % — SIGNIFICANT CHANGE UP (ref 13–44)
MAGNESIUM SERPL-MCNC: 2 MG/DL — SIGNIFICANT CHANGE UP (ref 1.6–2.6)
MCHC RBC-ENTMCNC: 31 PG — SIGNIFICANT CHANGE UP (ref 27–34)
MCHC RBC-ENTMCNC: 32.3 GM/DL — SIGNIFICANT CHANGE UP (ref 32–36)
MCV RBC AUTO: 96 FL — SIGNIFICANT CHANGE UP (ref 80–100)
MONOCYTES # BLD AUTO: 0.41 K/UL — SIGNIFICANT CHANGE UP (ref 0–0.9)
MONOCYTES NFR BLD AUTO: 19.2 % — HIGH (ref 2–14)
NEUTROPHILS # BLD AUTO: 1 K/UL — LOW (ref 1.8–7.4)
NEUTROPHILS NFR BLD AUTO: 46.7 % — SIGNIFICANT CHANGE UP (ref 43–77)
NRBC # BLD: 0 /100 WBCS — SIGNIFICANT CHANGE UP (ref 0–0)
NRBC # FLD: 0 K/UL — SIGNIFICANT CHANGE UP (ref 0–0)
PHOSPHATE SERPL-MCNC: 5.8 MG/DL — HIGH (ref 2.5–4.5)
PLATELET # BLD AUTO: 119 K/UL — LOW (ref 150–400)
POTASSIUM SERPL-MCNC: 4.4 MMOL/L — SIGNIFICANT CHANGE UP (ref 3.5–5.3)
POTASSIUM SERPL-SCNC: 4.4 MMOL/L — SIGNIFICANT CHANGE UP (ref 3.5–5.3)
RBC # BLD: 3.26 M/UL — LOW (ref 4.2–5.8)
RBC # FLD: 13.7 % — SIGNIFICANT CHANGE UP (ref 10.3–14.5)
SODIUM SERPL-SCNC: 137 MMOL/L — SIGNIFICANT CHANGE UP (ref 135–145)
WBC # BLD: 2.14 K/UL — LOW (ref 3.8–10.5)
WBC # FLD AUTO: 2.14 K/UL — LOW (ref 3.8–10.5)

## 2022-10-22 RX ADMIN — PREGABALIN 1000 MICROGRAM(S): 225 CAPSULE ORAL at 17:23

## 2022-10-22 RX ADMIN — HEPARIN SODIUM 5000 UNIT(S): 5000 INJECTION INTRAVENOUS; SUBCUTANEOUS at 06:15

## 2022-10-22 RX ADMIN — CHLORHEXIDINE GLUCONATE 1 APPLICATION(S): 213 SOLUTION TOPICAL at 17:27

## 2022-10-22 RX ADMIN — REMDESIVIR 500 MILLIGRAM(S): 5 INJECTION INTRAVENOUS at 17:22

## 2022-10-22 RX ADMIN — PREGABALIN 1000 MICROGRAM(S): 225 CAPSULE ORAL at 17:24

## 2022-10-22 RX ADMIN — PANTOPRAZOLE SODIUM 40 MILLIGRAM(S): 20 TABLET, DELAYED RELEASE ORAL at 06:15

## 2022-10-22 RX ADMIN — HEPARIN SODIUM 5000 UNIT(S): 5000 INJECTION INTRAVENOUS; SUBCUTANEOUS at 17:23

## 2022-10-22 RX ADMIN — ERYTHROPOIETIN 10000 UNIT(S): 10000 INJECTION, SOLUTION INTRAVENOUS; SUBCUTANEOUS at 11:56

## 2022-10-22 NOTE — PROGRESS NOTE ADULT - SUBJECTIVE AND OBJECTIVE BOX
EDGAR CLAUDIO  57y  Male      Patient is a 57y old  Male who presents with a chief complaint of Diarrhea (22 Oct 2022 14:58)  feels better,no cough,no fever,diarrhoea resolved    REVIEW OF SYSTEMS:  CONSTITUTIONAL: No fever  RESPIRATORY: No cough, hemoptysis or shortness of breath  CARDIOVASCULAR: No chest pain, palpitations, dizziness, or leg swelling  GASTROINTESTINAL: No abdominal pain. nausea, vomiting, hematemesis  GENITOURINARY: No dysuria, frequency, hematuria   NEUROLOGICAL: No headaches, no dizziness  MUSCULOSKELETAL: No joint pain or swelling;     INTERVAL HPI/OVERNIGHT EVENTS:  T(C): 36.4 (10-22-22 @ 13:40), Max: 36.6 (10-21-22 @ 22:07)  HR: 77 (10-22-22 @ 13:40) (73 - 77)  BP: 127/79 (10-22-22 @ 13:40) (101/63 - 127/79)  RR: 17 (10-22-22 @ 13:40) (17 - 18)  SpO2: 95% (10-22-22 @ 06:15) (95% - 97%)  Wt(kg): --  I&O's Summary    22 Oct 2022 07:01  -  22 Oct 2022 18:45  --------------------------------------------------------  IN: 400 mL / OUT: 1400 mL / NET: -1000 mL      T(C): 36.4 (10-22-22 @ 13:40), Max: 36.6 (10-21-22 @ 22:07)  HR: 77 (10-22-22 @ 13:40) (73 - 77)  BP: 127/79 (10-22-22 @ 13:40) (101/63 - 127/79)  RR: 17 (10-22-22 @ 13:40) (17 - 18)  SpO2: 95% (10-22-22 @ 06:15) (95% - 97%)  Wt(kg): --Vital Signs Last 24 Hrs  T(C): 36.4 (22 Oct 2022 13:40), Max: 36.6 (21 Oct 2022 22:07)  T(F): 97.6 (22 Oct 2022 13:40), Max: 97.8 (21 Oct 2022 22:07)  HR: 77 (22 Oct 2022 13:40) (73 - 77)  BP: 127/79 (22 Oct 2022 13:40) (101/63 - 127/79)  BP(mean): --  RR: 17 (22 Oct 2022 13:40) (17 - 18)  SpO2: 95% (22 Oct 2022 06:15) (95% - 97%)    Parameters below as of 22 Oct 2022 13:40  Patient On (Oxygen Delivery Method): room air        LABS:                        10.1   2.14  )-----------( 119      ( 22 Oct 2022 05:38 )             31.3     10-22    137  |  95<L>  |  54<H>  ----------------------------<  95  4.4   |  29  |  7.56<H>    Ca    10.3      22 Oct 2022 05:38  Phos  5.8     10-22  Mg     2.00     10-22          CAPILLARY BLOOD GLUCOSE                PAST MEDICAL & SURGICAL HISTORY:  HTN (hypertension)      End stage renal disease on dialysis      Coronary artery disease      Polycystic kidney      Polycystic liver disease      Hepatitis C      AV fistula      Stented coronary artery      Coronary artery disease involving other coronary artery bypass graft          MEDICATIONS  (STANDING):  amLODIPine   Tablet 5 milliGRAM(s) Oral daily  chlorhexidine 2% Cloths 1 Application(s) Topical daily  cyanocobalamin 1000 MICROGram(s) Oral daily  epoetin nydia-epbx (RETACRIT) Injectable 07398 Unit(s) IV Push <User Schedule>  heparin   Injectable 5000 Unit(s) SubCutaneous every 12 hours  isosorbide   mononitrate ER Tablet (IMDUR) 30 milliGRAM(s) Oral daily  metoprolol succinate ER 50 milliGRAM(s) Oral daily  pantoprazole    Tablet 40 milliGRAM(s) Oral before breakfast    MEDICATIONS  (PRN):  acetaminophen     Tablet .. 650 milliGRAM(s) Oral every 6 hours PRN Temp greater or equal to 38C (100.4F), Mild Pain (1 - 3)  ALBUTerol    90 MICROgram(s) HFA Inhaler 2 Puff(s) Inhalation every 6 hours PRN Bronchospasm  aluminum hydroxide/magnesium hydroxide/simethicone Suspension 30 milliLiter(s) Oral every 4 hours PRN Dyspepsia  guaiFENesin Oral Liquid (Sugar-Free) 100 milliGRAM(s) Oral every 6 hours PRN Cough  hydrocodone/homatropine Syrup 5 milliLiter(s) Oral every 8 hours PRN severe cough  melatonin 3 milliGRAM(s) Oral at bedtime PRN Insomnia  ondansetron Injectable 4 milliGRAM(s) IV Push every 8 hours PRN Nausea and/or Vomiting  sodium chloride 0.65% Nasal 1 Spray(s) Both Nostrils three times a day PRN Nasal Congestion        RADIOLOGY & ADDITIONAL TESTS:    Imaging Personally Reviewed:  [ ] YES  [ ] NO    Consultant(s) Notes Reviewed:  [x ] YES  [ ] NO    PHYSICAL EXAM:  GENERAL: Alert and awake lying in bed in no distress  HEAD:  Atraumatic, Normocephalic  EYES: EOMI, ARA, conjunctiva and sclera clear  NECK: Supple, No JVD, Normal thyroid  NERVOUS SYSTEM:  Alert & Oriented X3, Motor and sensory systems are intact,   CHEST/LUNG: Bilateral clear breath sounds, no rhochi, no wheezing, no crepitations,  HEART: Regular rate and rhythm; No murmurs, rubs, or gallops  ABDOMEN: Soft, Nontender, Nondistended; Bowel sounds present  EXTREMITIES:   Peripheral Pulses are palpable, no  edema        Care Discussed with Consultants/Other Providers [x ] YES  [ ] NO      Code Status: [] Full Code [] DNR [] DNI [] Goals of Care:   Disposition: [] ICU [] Stroke Unit [] RCU []PCU []Floor [] Discharge Home         ALFRED EvnasP

## 2022-10-22 NOTE — PROGRESS NOTE ADULT - SUBJECTIVE AND OBJECTIVE BOX
NEPHROLOGY     Patient seen and examined.    MEDICATIONS  (STANDING):  amLODIPine   Tablet 5 milliGRAM(s) Oral daily  chlorhexidine 2% Cloths 1 Application(s) Topical daily  cyanocobalamin 1000 MICROGram(s) Oral daily  cyanocobalamin Injectable 1000 MICROGram(s) IntraMuscular daily  epoetin nydia-epbx (RETACRIT) Injectable 00978 Unit(s) IV Push <User Schedule>  heparin   Injectable 5000 Unit(s) SubCutaneous every 12 hours  isosorbide   mononitrate ER Tablet (IMDUR) 30 milliGRAM(s) Oral daily  metoprolol succinate ER 50 milliGRAM(s) Oral daily  pantoprazole    Tablet 40 milliGRAM(s) Oral before breakfast  remdesivir  IVPB 100 milliGRAM(s) IV Intermittent every 24 hours    VITALS:  T(C): , Max: 36.6 (10-21-22 @ 22:07)  T(F): , Max: 97.8 (10-21-22 @ 22:07)  HR: 77 (10-22-22 @ 13:40)  BP: 127/79 (10-22-22 @ 13:40)  RR: 17 (10-22-22 @ 13:40)  SpO2: 95% (10-22-22 @ 06:15)    I and O's:    10-22 @ 07:01  -  10-22 @ 14:58  --------------------------------------------------------  IN: 400 mL / OUT: 1400 mL / NET: -1000 mL    PHYSICAL EXAM:  Constitutional: NAD at rest on NCO2  HEENT: NCAT, MMM  Neck:  No JVD  Respiratory: grossly clear b/l  Cardiovascular: reg s1s2  Gastrointestinal: BS+, soft, NT/ND  Extremities: No peripheral edema  Neurological: A/O x 3, no focal deficits  Psychiatric: Normal mood, normal affect  : No Brooks  Skin: No rashes  Access: LUE AVF (+thrill)     LABS:                        10.1   2.14  )-----------( 119      ( 22 Oct 2022 05:38 )             31.3     10-22    137  |  95<L>  |  54<H>  ----------------------------<  95  4.4   |  29  |  7.56<H>    Ca    10.3      22 Oct 2022 05:38  Phos  5.8     10-22  Mg     2.00     10-22   NEPHROLOGY     Patient seen and examined, reports feeling better, denies sob, cough improved, comfortable on room air, in no acute distress. He had HD today and removed 1kg.     MEDICATIONS  (STANDING):  amLODIPine   Tablet 5 milliGRAM(s) Oral daily  chlorhexidine 2% Cloths 1 Application(s) Topical daily  cyanocobalamin 1000 MICROGram(s) Oral daily  cyanocobalamin Injectable 1000 MICROGram(s) IntraMuscular daily  epoetin nydia-epbx (RETACRIT) Injectable 30561 Unit(s) IV Push <User Schedule>  heparin   Injectable 5000 Unit(s) SubCutaneous every 12 hours  isosorbide   mononitrate ER Tablet (IMDUR) 30 milliGRAM(s) Oral daily  metoprolol succinate ER 50 milliGRAM(s) Oral daily  pantoprazole    Tablet 40 milliGRAM(s) Oral before breakfast  remdesivir  IVPB 100 milliGRAM(s) IV Intermittent every 24 hours    VITALS:  T(C): , Max: 36.6 (10-21-22 @ 22:07)  T(F): , Max: 97.8 (10-21-22 @ 22:07)  HR: 77 (10-22-22 @ 13:40)  BP: 127/79 (10-22-22 @ 13:40)  RR: 17 (10-22-22 @ 13:40)  SpO2: 95% (10-22-22 @ 06:15)    I and O's:    10-22 @ 07:01  -  10-22 @ 14:58  --------------------------------------------------------  IN: 400 mL / OUT: 1400 mL / NET: -1000 mL    PHYSICAL EXAM:  Constitutional: NAD at rest on NCO2  HEENT: NCAT, MMM  Neck:  No JVD  Respiratory: grossly clear b/l  Cardiovascular: reg s1s2  Gastrointestinal: BS+, soft, NT/ND  Extremities: No peripheral edema  Neurological: A/O x 3, no focal deficits  Psychiatric: Normal mood, normal affect  : No Brooks  Skin: No rashes  Access: LUE AVF (+thrill)     LABS:                        10.1   2.14  )-----------( 119      ( 22 Oct 2022 05:38 )             31.3     10-22    137  |  95<L>  |  54<H>  ----------------------------<  95  4.4   |  29  |  7.56<H>    Ca    10.3      22 Oct 2022 05:38  Phos  5.8     10-22  Mg     2.00     10-22

## 2022-10-22 NOTE — CONSULT NOTE ADULT - SUBJECTIVE AND OBJECTIVE BOX
10-22-22 @ 12:30    Patient is a 57y old  Male who presents with a chief complaint of Diarrhea (21 Oct 2022 17:28)      HPI:  56 yo M with h/o ESRD on T/Th/S dialysis 2/2 PKD, CAD s/p stents p/w 3 days of watery diarrhea up to 4x/day. He has been feeling weak because of that. He did get his dialysis on Friday however his nephrologist felt he was getting too weak and sent him into the hospital. He had COVID on Oct 5/6 with a cough and later tested positive at [facility]. His symptoms progressed over the past few days and now having diarrhea. Denies CP, SOB, n/v/abdominal pain, dysuria, peripheral edema, fever/chills. No recent abx exposures.  In the ED, VSS. VSS, Wbc 2.2, Hgb 10 (at baseline). Plt 72. COVID. CXR without consolidations. (15 Oct 2022 06:34)   he has no underlying lung disease and now is found to have covid positive : he is o n room iar;  says his breathing is fine and has no cough or phlegm  : he never smoked too       ?FOLLOWING PRESENT  x[ ] Hx of PE/DVT, [ x] Hx COPD, [ ]x Hx of Asthma, [ ] Hx of Hospitalization, [ ]x  Hx of BiPAP/CPAP use, [ x] Hx of IVETTE    Allergies    No Known Allergies    Intolerances        PAST MEDICAL & SURGICAL HISTORY:  HTN (hypertension)      End stage renal disease on dialysis      Coronary artery disease      Polycystic kidney      Polycystic liver disease      Hepatitis C      AV fistula      Stented coronary artery      Coronary artery disease involving other coronary artery bypass graft          FAMILY HISTORY:  No pertinent family history in first degree relatives        Social History: [ x ] TOBACCO                  [x  ] ETOH                                 [ x ] IVDA/DRUGS    REVIEW OF SYSTEMS      General:	x    Skin/Breast:x  	  Ophthalmologic:x  	  ENMT:	x    Respiratory and Thorax: no cough , no  phlegm :   	  Cardiovascular:	x    Gastrointestinal:	diarrhea    Genitourinary:	z    Musculoskeletal:	x    Neurological:	x    Psychiatric:	x    Hematology/Lymphatics:	x    Endocrine:	  x  Allergic/Immunologic:	    MEDICATIONS  (STANDING):  amLODIPine   Tablet 5 milliGRAM(s) Oral daily  chlorhexidine 2% Cloths 1 Application(s) Topical daily  cyanocobalamin 1000 MICROGram(s) Oral daily  cyanocobalamin Injectable 1000 MICROGram(s) IntraMuscular daily  epoetin nydia-epbx (RETACRIT) Injectable 51780 Unit(s) IV Push <User Schedule>  heparin   Injectable 5000 Unit(s) SubCutaneous every 12 hours  isosorbide   mononitrate ER Tablet (IMDUR) 30 milliGRAM(s) Oral daily  metoprolol succinate ER 50 milliGRAM(s) Oral daily  pantoprazole    Tablet 40 milliGRAM(s) Oral before breakfast  remdesivir  IVPB 100 milliGRAM(s) IV Intermittent every 24 hours    MEDICATIONS  (PRN):  acetaminophen     Tablet .. 650 milliGRAM(s) Oral every 6 hours PRN Temp greater or equal to 38C (100.4F), Mild Pain (1 - 3)  ALBUTerol    90 MICROgram(s) HFA Inhaler 2 Puff(s) Inhalation every 6 hours PRN Bronchospasm  aluminum hydroxide/magnesium hydroxide/simethicone Suspension 30 milliLiter(s) Oral every 4 hours PRN Dyspepsia  guaiFENesin Oral Liquid (Sugar-Free) 100 milliGRAM(s) Oral every 6 hours PRN Cough  hydrocodone/homatropine Syrup 5 milliLiter(s) Oral every 8 hours PRN severe cough  melatonin 3 milliGRAM(s) Oral at bedtime PRN Insomnia  ondansetron Injectable 4 milliGRAM(s) IV Push every 8 hours PRN Nausea and/or Vomiting  sodium chloride 0.65% Nasal 1 Spray(s) Both Nostrils three times a day PRN Nasal Congestion       Vital Signs Last 24 Hrs  T(C): 36.5 (22 Oct 2022 10:30), Max: 36.6 (21 Oct 2022 22:07)  T(F): 97.7 (22 Oct 2022 10:30), Max: 97.8 (21 Oct 2022 22:07)  HR: 73 (22 Oct 2022 10:30) (73 - 75)  BP: 106/64 (22 Oct 2022 10:30) (95/70 - 106/64)  BP(mean): --  RR: 17 (22 Oct 2022 10:30) (17 - 18)  SpO2: 95% (22 Oct 2022 06:15) (94% - 97%)    Parameters below as of 22 Oct 2022 10:30  Patient On (Oxygen Delivery Method): room air    Orthostatic VS          I&O's Summary      Physical Exam:   GENERAL: NAD, well-groomed, well-developed  HEENT: ARA/   Atraumatic, Normocephalic  ENMT: No tonsillar erythema, exudates, or enlargement; Moist mucous membranes, Good dentition, No lesions  NECK: Supple, No JVD, Normal thyroid  CHEST/LUNG: Clear to auscultation bilaterally  CVS: Regular rate and rhythm; No murmurs, rubs, or gallops  GI: : Soft, Nontender, Nondistended; Bowel sounds present  NERVOUS SYSTEM:  Alert & Oriented X3  EXTREMITIES: - edema  LYMPH: No lymphadenopathy noted  SKIN: No rashes or lesions  ENDOCRINOLOGY: No Thyromegaly  PSYCH: Appropriate    Labs:                              10.1   2.14  )-----------( 119      ( 22 Oct 2022 05:38 )             31.3                         9.8    1.86  )-----------( 101      ( 21 Oct 2022 05:30 )             29.7                         9.6    1.70  )-----------( 78       ( 20 Oct 2022 06:12 )             28.6                         10.0   1.75  )-----------( 72       ( 19 Oct 2022 06:12 )             29.6     10-22    137  |  95<L>  |  54<H>  ----------------------------<  95  4.4   |  29  |  7.56<H>  10-21    136  |  94<L>  |  42<H>  ----------------------------<  82  4.0   |  30  |  5.61<H>  10-20    138  |  95<L>  |  62<H>  ----------------------------<  83  4.7   |  25  |  7.88<H>  10-19    133<L>  |  93<L>  |  38<H>  ----------------------------<  89  4.2   |  26  |  6.02<H>    Ca    10.3      22 Oct 2022 05:38  Ca    10.2      21 Oct 2022 05:30  Phos  5.8     10-22  Phos  5.2     10-21  Mg     2.00     10-22  Mg     1.90     10-21    TPro  6.1  /  Alb  3.2<L>  /  TBili  0.7  /  DBili  x   /  AST  58<H>  /  ALT  24  /  AlkPhos  114  10-19    CAPILLARY BLOOD GLUCOSE              D DImer  Procalcitonin, Serum: 1.52 ng/mL (10-20 @ 06:12)  D-Dimer Assay, Quantitative: 381 ng/mL DDU (10-20 @ 06:12)  D-Dimer Assay, Quantitative: 279 ng/mL DDU (10-16 @ 13:27)      Studies  Chest X-RAY  CT SCAN Chest   CT Abdomen  Venous Dopplers: LE:   Others              rad< from: Xray Chest 1 View- PORTABLE-Urgent (Xray Chest 1 View- PORTABLE-Urgent .) (10.15.22 @ 01:52) >    ACC: [id number] EXAM:  XR CHEST PORTABLE URGENT 1V                          PROCEDURE DATE:  10/15/2022          INTERPRETATION:  CLINICAL INDICATION:  covid +, end-stage renal disease   on dialysis    COMPARISON: Chest radiograph dated 3/2/2016    TECHNIQUE: Frontal radiograph of the chest.    FINDINGS:    Cardiac/mediastinum/hilum: Median sternotomy wires and surgical clips.   Heart size cannot be accurately assessed on this projection.    Lung parenchyma/Pleura: The lungs are clear. There is no pleural   effusion. There is no pneumothorax.    Skeleton/soft tissues: No acute osseous abnormalities.    IMPRESSION:    No focal consolidations.    --- End of Report ---          LIA RUIZ MD; Resident Radiologist  This document has been electronically signed.  EULALIA REYNOLDS MD; Attending Radiologist  This document has been electronically signed. Oct 15 2022  8:06AM    < end of copied text >  < from: Xray Chest 1 View AP/PA (03.02.16 @ 11:42) >    EXAM:  CHEST SINGLE AP OR PA                            PROCEDURE DATE:  Mar  2 2016       INTERPRETATION:  CLINICAL INFORMATION: Pretransplant evaluation..    EXAM: Frontal radiograph of the chest from March 2, 2016.    COMPARISON: Chest radiograph from June 4, 2014.    FINDINGS:  Status post median sternotomy. Surgical clips overlie the mediastinum.  The lungs are clear.  There are no pleural effusions or pneumothorax.  Cardiomediastinal silhouette is mildly enlarged.    IMPRESSION: Clear lungs. Mild cardiomegaly.         UNIQUE CHAPPELL M.D., RADIOLOGY RESIDENT  This document has been electronically signed.  ALAN BHARDWAJ M.D. ATTENDING RADIOLOGIST  This document has been electronically signed. Mar  2 2016  3:52P              < end of copied text >

## 2022-10-22 NOTE — PROGRESS NOTE ADULT - ASSESSMENT
56 yo M with h/o ESRD on T/Th/S dialysis 2/2 PKD, CAD s/p stents p/w 3 days of watery diarrhea up to 4x/day now admitted for work up of acute diarrhea. Patient tolerated oral contrast. Pt to go to CT shortly

## 2022-10-22 NOTE — PROGRESS NOTE ADULT - TIME BILLING
-d/w YULIANARN
Intermediate / Complex Repair - Final Wound Length In Cm: 1.9

## 2022-10-22 NOTE — PROGRESS NOTE ADULT - ASSESSMENT
ASSESSMENT: 57M w/ [id number], HTN, and CAD, 10/16/22 p/w COVID/diarrhea/fever     (1)Renal -  ESRD-HD - s/p HD today  (2)CV - hemodynamically stable  (3)COVID - s/p Remdesivir    RECOMMENDATIONS:  (1)Next HD Tuesday (inpatient vs outpatient) - 1.5 kg UF as able  (2)Retacrit with HD     MADELEINE Rahman  [facility]  [phone]

## 2022-10-22 NOTE — CONSULT NOTE ADULT - PROBLEM SELECTOR RECOMMENDATION 9
he feels pretty good at this time : he has no resp symptoms : his procal is high but he is on HD:  he is on room air  : he denies having pe or dvt before: HIs d dimer on 20th was high but not  too much as seen in covid ,  moreover has has no resp symptoms and he is on room air: he never got vaccinated for covid

## 2022-10-22 NOTE — CONSULT NOTE ADULT - PROBLEM/RECOMMENDATION-3
Caller: Lili Ruiz    Relationship: Self    Best call back number: [phone]    Medication needed:   Requested Prescriptions     Pending Prescriptions Disp Refills   • traMADol (ULTRAM) 50 MG tablet 30 tablet 2     Sig: Take 1 tablet by mouth Every 8 (Eight) Hours As Needed for Moderate Pain .   • lisinopril (PRINIVIL,ZESTRIL) 40 MG tablet 90 tablet 1     Sig: Take 1 tablet by mouth Daily.       When do you need the refill by: ASAP    What details did the patient provide when requesting the medication: 3 DAYS LEFT    Does the patient have less than a 3 day supply:  [] Yes  [x] No    What is the patient's preferred pharmacy: [id number] [address] AT [facility] [phone] [facility] [phone] [facility]         
DISPLAY PLAN FREE TEXT

## 2022-10-22 NOTE — PROGRESS NOTE ADULT - PROBLEM SELECTOR PLAN 8
FENP: No IVF, Lytes PRN, Renal diet, heparin ppx

## 2022-10-23 VITALS
DIASTOLIC BLOOD PRESSURE: 59 MMHG | OXYGEN SATURATION: 99 % | HEART RATE: 70 BPM | SYSTOLIC BLOOD PRESSURE: 95 MMHG | TEMPERATURE: 98 F | RESPIRATION RATE: 18 BRPM

## 2022-10-23 LAB
ALBUMIN SERPL ELPH-MCNC: 2.6 G/DL — LOW (ref 3.3–5)
ALP SERPL-CCNC: 100 U/L — SIGNIFICANT CHANGE UP (ref 40–120)
ALT FLD-CCNC: 14 U/L — SIGNIFICANT CHANGE UP (ref 4–41)
ANION GAP SERPL CALC-SCNC: 12 MMOL/L — SIGNIFICANT CHANGE UP (ref 7–14)
AST SERPL-CCNC: 16 U/L — SIGNIFICANT CHANGE UP (ref 4–40)
BASOPHILS # BLD AUTO: 0.02 K/UL — SIGNIFICANT CHANGE UP (ref 0–0.2)
BASOPHILS NFR BLD AUTO: 0.8 % — SIGNIFICANT CHANGE UP (ref 0–2)
BILIRUB DIRECT SERPL-MCNC: 0.3 MG/DL — SIGNIFICANT CHANGE UP (ref 0–0.3)
BILIRUB INDIRECT FLD-MCNC: 0.4 MG/DL — SIGNIFICANT CHANGE UP (ref 0–1)
BILIRUB SERPL-MCNC: 0.7 MG/DL — SIGNIFICANT CHANGE UP (ref 0.2–1.2)
BUN SERPL-MCNC: 36 MG/DL — HIGH (ref 7–23)
CALCIUM SERPL-MCNC: 10.7 MG/DL — HIGH (ref 8.4–10.5)
CHLORIDE SERPL-SCNC: 95 MMOL/L — LOW (ref 98–107)
CO2 SERPL-SCNC: 28 MMOL/L — SIGNIFICANT CHANGE UP (ref 22–31)
CREAT SERPL-MCNC: 5.64 MG/DL — HIGH (ref 0.5–1.3)
CREAT SERPL-MCNC: 5.69 MG/DL — HIGH (ref 0.5–1.3)
CRP SERPL-MCNC: 31.4 MG/L — HIGH
D DIMER BLD IA.RAPID-MCNC: 611 NG/ML DDU — HIGH
EGFR: 11 ML/MIN/1.73M2 — LOW
EGFR: 11 ML/MIN/1.73M2 — LOW
EOSINOPHIL # BLD AUTO: 0.07 K/UL — SIGNIFICANT CHANGE UP (ref 0–0.5)
EOSINOPHIL NFR BLD AUTO: 2.8 % — SIGNIFICANT CHANGE UP (ref 0–6)
FERRITIN SERPL-MCNC: 1156 NG/ML — HIGH (ref 30–400)
GLUCOSE SERPL-MCNC: 90 MG/DL — SIGNIFICANT CHANGE UP (ref 70–99)
HCT VFR BLD CALC: 33 % — LOW (ref 39–50)
HGB BLD-MCNC: 10.3 G/DL — LOW (ref 13–17)
IANC: 1.25 K/UL — LOW (ref 1.8–7.4)
IMM GRANULOCYTES NFR BLD AUTO: 0.4 % — SIGNIFICANT CHANGE UP (ref 0–0.9)
LYMPHOCYTES # BLD AUTO: 0.75 K/UL — LOW (ref 1–3.3)
LYMPHOCYTES # BLD AUTO: 30.2 % — SIGNIFICANT CHANGE UP (ref 13–44)
MAGNESIUM SERPL-MCNC: 1.9 MG/DL — SIGNIFICANT CHANGE UP (ref 1.6–2.6)
MCHC RBC-ENTMCNC: 30.3 PG — SIGNIFICANT CHANGE UP (ref 27–34)
MCHC RBC-ENTMCNC: 31.2 GM/DL — LOW (ref 32–36)
MCV RBC AUTO: 97.1 FL — SIGNIFICANT CHANGE UP (ref 80–100)
MONOCYTES # BLD AUTO: 0.38 K/UL — SIGNIFICANT CHANGE UP (ref 0–0.9)
MONOCYTES NFR BLD AUTO: 15.3 % — HIGH (ref 2–14)
NEUTROPHILS # BLD AUTO: 1.25 K/UL — LOW (ref 1.8–7.4)
NEUTROPHILS NFR BLD AUTO: 50.5 % — SIGNIFICANT CHANGE UP (ref 43–77)
NRBC # BLD: 0 /100 WBCS — SIGNIFICANT CHANGE UP (ref 0–0)
NRBC # FLD: 0 K/UL — SIGNIFICANT CHANGE UP (ref 0–0)
PHOSPHATE SERPL-MCNC: 4.8 MG/DL — HIGH (ref 2.5–4.5)
PLATELET # BLD AUTO: 122 K/UL — LOW (ref 150–400)
POTASSIUM SERPL-MCNC: 3.8 MMOL/L — SIGNIFICANT CHANGE UP (ref 3.5–5.3)
POTASSIUM SERPL-SCNC: 3.8 MMOL/L — SIGNIFICANT CHANGE UP (ref 3.5–5.3)
PROT SERPL-MCNC: 5.7 G/DL — LOW (ref 6–8.3)
RBC # BLD: 3.4 M/UL — LOW (ref 4.2–5.8)
RBC # FLD: 13.7 % — SIGNIFICANT CHANGE UP (ref 10.3–14.5)
SODIUM SERPL-SCNC: 135 MMOL/L — SIGNIFICANT CHANGE UP (ref 135–145)
WBC # BLD: 2.48 K/UL — LOW (ref 3.8–10.5)
WBC # FLD AUTO: 2.48 K/UL — LOW (ref 3.8–10.5)

## 2022-10-23 RX ORDER — ERYTHROPOIETIN 10000 [IU]/ML
10000 INJECTION, SOLUTION INTRAVENOUS; SUBCUTANEOUS
Qty: 0 | Refills: 0 | DISCHARGE
Start: 2022-10-23

## 2022-10-23 RX ORDER — PREGABALIN 225 MG/1
1 CAPSULE ORAL
Qty: 30 | Refills: 0
Start: 2022-10-23 | End: 2022-11-21

## 2022-10-23 RX ADMIN — PANTOPRAZOLE SODIUM 40 MILLIGRAM(S): 20 TABLET, DELAYED RELEASE ORAL at 06:00

## 2022-10-23 RX ADMIN — HEPARIN SODIUM 5000 UNIT(S): 5000 INJECTION INTRAVENOUS; SUBCUTANEOUS at 06:00

## 2022-10-23 RX ADMIN — PREGABALIN 1000 MICROGRAM(S): 225 CAPSULE ORAL at 12:43

## 2022-10-23 RX ADMIN — AMLODIPINE BESYLATE 5 MILLIGRAM(S): 2.5 TABLET ORAL at 05:59

## 2022-10-23 RX ADMIN — CHLORHEXIDINE GLUCONATE 1 APPLICATION(S): 213 SOLUTION TOPICAL at 12:43

## 2022-10-23 RX ADMIN — Medication 50 MILLIGRAM(S): at 05:59

## 2022-10-23 NOTE — PROGRESS NOTE ADULT - PROBLEM SELECTOR PROBLEM 4
ESRD (end stage renal disease)

## 2022-10-23 NOTE — PROGRESS NOTE ADULT - PROBLEM SELECTOR PLAN 5
No chest pain  -ASA resumed
No chest pain  -ASA resume
No chest pain  -ASA resumed
no chest pain : no SOB :
No chest pain  -ASA resumed

## 2022-10-23 NOTE — PROGRESS NOTE ADULT - PROBLEM SELECTOR PLAN 2
From COVID, along with pancytopenia-monitor  -Hematology FU appreciated  Lab parameters are improving
From COVID, along with pancytopenia-monitor  -Hematology consult appreciated
From COVID, along with pancytopenia-monitor  -Hematology consult appreciated
resolved:
From COVID, along with pancytopenia-monitor  -Hematology consult appreciated
From COVID, along with pancytopenia  May need ID evaluation for Remdesvir
From COVID, along with pancytopenia  May need ID evaluation for Remdesvir
From COVID, along with pancytopenia-monitor  -Hematology FU appreciated  Lab parameters are improving
From COVID, along with pancytopenia-monitor  -Hematology consult appreciated

## 2022-10-23 NOTE — PROGRESS NOTE ADULT - PROBLEM SELECTOR PLAN 1
Related to COVID  FU electrolytes
Related to COVID  FU electrolytes  -ID consult appreciated. s/pt Remdesivir
Related to COVID  FU electrolytes  -ID consult appreciated. s/p Remdesivir
resolved
Related to COVID  FU electrolytes
Related to COVID  FU electrolytes  -ID consult appreciated. On Remdesivir
Related to COVID  FU electrolytes  -ID consult appreciated. On Remdesivir  FU GI PCR
Related to COVID  FU electrolytes  -ID consult appreciated. Cont Remdesivir
Related to COVID  FU electrolytes  -ID consult appreciated..GI PCR

## 2022-10-23 NOTE — PROGRESS NOTE ADULT - PROVIDER SPECIALTY LIST ADULT
Infectious Disease
Nephrology
Infectious Disease
Nephrology
Internal Medicine
Pulmonology
Internal Medicine

## 2022-10-23 NOTE — PROGRESS NOTE ADULT - PROBLEM SELECTOR PLAN 4
T/T/S but had HD yesterday.  -Can most likely get HD outpatient
T/T/S but had HD yesterday.  -Can most likely get HD outpatient
-HD per Renal
-HD per Renal
T/T/S but had HD yesterday.  -Can most likely get HD outpatient
T/T/S but had HD yesterday.  -Can most likely get HD outpatient
onHD
T/T/S but had HD yesterday.  -Can most likely get HD outpatient
-HD per Renal

## 2022-10-23 NOTE — PROGRESS NOTE ADULT - PROBLEM SELECTOR PLAN 6
Controlled  -Continue home medications
Controlled
Controlled  -Continue home medications
Controlled  -Continue home medications

## 2022-10-23 NOTE — PROGRESS NOTE ADULT - PROBLEM SELECTOR PLAN 7
Stable. Likely in setting of ESRD  -No [facility] inpatient, resume outpatient
stable
Stable. Likely in setting of ESRD  -No [facility] inpatient, resume outpatient

## 2022-10-23 NOTE — PROGRESS NOTE ADULT - PROBLEM SELECTOR PLAN 3
Currently not hypoxic. s/p Remdesivir..  Hepc PCR-VL not detected
Currently not hypoxic. ,started on Remdesivir.f/u markers.  Hepc PCR
neds home oxygen : delivered: going home today
Currently not hypoxic. No treatment required
Currently not hypoxic. No treatment required
Currently not hypoxic. ,started on Remdesivir.f/u markers.  Hepc PCR
Currently not hypoxic. ,started on Remdesivir.f/u markers.  Hepc PCR

## 2022-10-23 NOTE — PROGRESS NOTE ADULT - SUBJECTIVE AND OBJECTIVE BOX
Date of Service: 10-23-22 @ 12:45    Patient is a 57y old  Male who presents with a chief complaint of Diarrhea (22 Oct 2022 17:44)      Any change in ROS:  doing ok : on 2 L of oxygen  :      MEDICATIONS  (STANDING):  amLODIPine   Tablet 5 milliGRAM(s) Oral daily  chlorhexidine 2% Cloths 1 Application(s) Topical daily  cyanocobalamin 1000 MICROGram(s) Oral daily  epoetin nydia-epbx (RETACRIT) Injectable 67242 Unit(s) IV Push <User Schedule>  heparin   Injectable 5000 Unit(s) SubCutaneous every 12 hours  isosorbide   mononitrate ER Tablet (IMDUR) 30 milliGRAM(s) Oral daily  metoprolol succinate ER 50 milliGRAM(s) Oral daily  pantoprazole    Tablet 40 milliGRAM(s) Oral before breakfast    MEDICATIONS  (PRN):  acetaminophen     Tablet .. 650 milliGRAM(s) Oral every 6 hours PRN Temp greater or equal to 38C (100.4F), Mild Pain (1 - 3)  ALBUTerol    90 MICROgram(s) HFA Inhaler 2 Puff(s) Inhalation every 6 hours PRN Bronchospasm  aluminum hydroxide/magnesium hydroxide/simethicone Suspension 30 milliLiter(s) Oral every 4 hours PRN Dyspepsia  guaiFENesin Oral Liquid (Sugar-Free) 100 milliGRAM(s) Oral every 6 hours PRN Cough  hydrocodone/homatropine Syrup 5 milliLiter(s) Oral every 8 hours PRN severe cough  melatonin 3 milliGRAM(s) Oral at bedtime PRN Insomnia  ondansetron Injectable 4 milliGRAM(s) IV Push every 8 hours PRN Nausea and/or Vomiting  sodium chloride 0.65% Nasal 1 Spray(s) Both Nostrils three times a day PRN Nasal Congestion    Vital Signs Last 24 Hrs  T(C): 36.9 (23 Oct 2022 05:50), Max: 37.1 (22 Oct 2022 20:54)  T(F): 98.4 (23 Oct 2022 05:50), Max: 98.7 (22 Oct 2022 20:54)  HR: 78 (23 Oct 2022 05:50) (77 - 87)  BP: 105/61 (23 Oct 2022 05:50) (100/61 - 127/79)  BP(mean): --  RR: 17 (23 Oct 2022 05:50) (17 - 18)  SpO2: 100% (23 Oct 2022 05:50) (100% - 100%)    Parameters below as of 23 Oct 2022 05:50  Patient On (Oxygen Delivery Method): nasal cannula  O2 Flow (L/min): 2      I&O's Summary    22 Oct 2022 07:01  -  23 Oct 2022 07:00  --------------------------------------------------------  IN: 400 mL / OUT: 1400 mL / NET: -1000 mL          Physical Exam:   GENERAL: NAD, well-groomed, well-developed  HEENT: ARA/   Atraumatic, Normocephalic  ENMT: No tonsillar erythema, exudates, or enlargement; Moist mucous membranes, Good dentition, No lesions  NECK: Supple, No JVD, Normal thyroid  CHEST/LUNG: Clear to auscultaion  CVS: Regular rate and rhythm; No murmurs, rubs, or gallops  GI: : Soft, Nontender, Nondistended; Bowel sounds present  NERVOUS SYSTEM:  Alert & Oriented X3  EXTREMITIES: - edema  LYMPH: No lymphadenopathy noted  SKIN: No rashes or lesions  ENDOCRINOLOGY: No Thyromegaly  PSYCH: Appropriate    Labs:                              10.3   2.48  )-----------( 122      ( 23 Oct 2022 05:40 )             33.0                         10.1   2.14  )-----------( 119      ( 22 Oct 2022 05:38 )             31.3                         9.8    1.86  )-----------( 101      ( 21 Oct 2022 05:30 )             29.7                         9.6    1.70  )-----------( 78       ( 20 Oct 2022 06:12 )             28.6     10-23    135  |  95<L>  |  36<H>  ----------------------------<  90  3.8   |  28  |  5.64<H>  10-22    137  |  95<L>  |  54<H>  ----------------------------<  95  4.4   |  29  |  7.56<H>  10-21    136  |  94<L>  |  42<H>  ----------------------------<  82  4.0   |  30  |  5.61<H>  10-20    138  |  95<L>  |  62<H>  ----------------------------<  83  4.7   |  25  |  7.88<H>    Ca    10.7<H>      23 Oct 2022 05:40  Ca    10.3      22 Oct 2022 05:38  Phos  4.8     10-23  Phos  5.8     10-22  Mg     1.90     10-23  Mg     2.00     10-22    TPro  5.7<L>  /  Alb  2.6<L>  /  TBili  0.7  /  DBili  0.3  /  AST  16  /  ALT  14  /  AlkPhos  100  10-23    CAPILLARY BLOOD GLUCOSE          LIVER FUNCTIONS - ( 23 Oct 2022 05:40 )  Alb: 2.6 g/dL / Pro: 5.7 g/dL / ALK PHOS: 100 U/L / ALT: 14 U/L / AST: 16 U/L / GGT: x               D-Dimer Assay, Quantitative: 611 ng/mL DDU (10-23 @ 05:40)  D-Dimer Assay, Quantitative: 381 ng/mL DDU (10-20 @ 06:12)  D-Dimer Assay, Quantitative: 279 ng/mL DDU (10-16 @ 13:27)  Procalcitonin, Serum: 1.52 ng/mL (10-20 @ 06:12)    rad< from: Xray Chest 1 View- PORTABLE-Urgent (Xray Chest 1 View- PORTABLE-Urgent .) (10.15.22 @ 01:52) >        INTERPRETATION:  CLINICAL INDICATION:  covid +, end-stage renal disease   on dialysis    COMPARISON: Chest radiograph dated 3/2/2016    TECHNIQUE: Frontal radiograph of the chest.    FINDINGS:    Cardiac/mediastinum/hilum: Median sternotomy wires and surgical clips.   Heart size cannot be accurately assessed on this projection.    Lung parenchyma/Pleura: The lungs are clear. There is no pleural   effusion. There is no pneumothorax.    Skeleton/soft tissues: No acute osseous abnormalities.    IMPRESSION:    No focal consolidations.    --- End of Report ---          LIA RUIZ MD; Resident Radiologist  This document has been electronically signed.  EULALIA REYNOLDS MD; Attending Radiologist  This document has been electronically signed. Oct 15 2022  8:06AM    < end of copied text >  < from: [facility] Duplex Lower Ext Vein Scan, Right (04.30.15 @ 19:53) >    EXAM:  DUPLEX EXT VEINS LOWER RT                            PROCEDURE DATE:  Apr 30 2015       INTERPRETATION:  Clinical Information: Right thigh pain    Comparison: None available.    TECHNIQUE: Portable Duplex sonography of the right lower extremity with   color and spectral Doppler, with and without compression.      FINDINGS:    There is normal compressibility of the common femoral, femoral,   popliteal, and portions of the calf veins.      Doppler examination shows normal spontaneous and phasic flow.    IMPRESSION: No evidence of deep vein thrombosis of the right lower   extremity.         SANJEEV SANCHEZ M.D., RADIOLOGY RESIDENT  KULWINDER HILL M.D., ATTENDING RADIOLOGIST    This examination was interpreted on: Apr 30 2015  7:56P.  Thisdocument   has been electronically signed. Apr 30 2015  8:19P.          < end of copied text >      RECENT CULTURES:        RESPIRATORY CULTURES:          Studies  Chest X-RAY  CT SCAN Chest   Venous Dopplers: LE:   CT Abdomen  Others

## 2022-10-23 NOTE — PROGRESS NOTE ADULT - PROBLEM SELECTOR PROBLEM 1
Diarrhea due to severe acute respiratory syndrome coronavirus 2 (SARS-CoV-2) infection

## 2022-10-23 NOTE — PROGRESS NOTE ADULT - REASON FOR ADMISSION
Diarrhea

## 2022-10-23 NOTE — PROGRESS NOTE ADULT - PROBLEM SELECTOR PROBLEM 2
Thrombocytopenia
fair

## 2022-11-03 ENCOUNTER — APPOINTMENT (OUTPATIENT)
Dept: HEPATOLOGY | Facility: CLINIC | Age: 57
End: 2022-11-03

## 2022-11-16 NOTE — PHYSICAL EXAM
S:     Carrillo Mariano is a 24 year old  at 39w0d EDC 2022 for routine return OB visit.    Reports some sandeep johnson cramping. Reports fetal movement. Slight vaginal discharge this week, denies leakage of fluid, no vaginal bleeding.     O:   Visit Vitals  /76 (BP Location: LUE - Left upper extremity, Patient Position: Sitting, Cuff Size: Small Adult) Comment (Cuff Size): Long cuff   Pulse 93   Resp 18   Ht 5' 3\" (1.6 m)   Wt 67.4 kg (148 lb 9.4 oz)   LMP 2022 (Exact Date)   BMI 26.32 kg/m²       SEE ACOG FLOWSHEET  General- no acute distress, pleasant  Lungs-respirations easy and unlabored  External genitalia- no lesions  Vagina-no lesions, small amt yellow tinged thick mucous in vault  Uterus-gravid, fundal height 39 cm   Cervix- no lesions  Adnexa- deferred     ASSESSMENT/PLAN:    1. Routine Care  -Aware of [facility] delivery hospital  - Labor  precautions/kick counts instructed  -Panorama NIPT testing, results Low Risk/AFP  Low risk   -Anatomy scan complete  -GTT normal    -S/P tdap     -declines flu vaccine  -GBS negative  -Amni test negative  (vaginal discharge normal in pregnancy)  -Follow up in 1 week         Palpitations   - S/P cardiology consult - work up  negative  -TSH  -normal     Problem List reviewed:     FWB:   -appropriate for gestational age  - precautions reviewed with patient       ALEX 39 weeks gestation    Patient Active Problem List    Diagnosis Date Noted   • 39 weeks gestation of pregnancy 2022     Priority: Low   • Vaginal discharge during pregnancy in third trimester 2022     Priority: Low   • Shellfish allergy 2022     Priority: Low   • Non-anaphylactic adverse reaction to food 2022     Priority: Low   • NSAID sensitivity 2022     Priority: Low   • 17 weeks gestation of pregnancy 2022     Priority: Low   • Normal pregnancy in second trimester 2022     Priority: Low   • 13 weeks gestation of pregnancy 2022     Priority:  Low   • Encounter for  screening 2022     Priority: Low   • Dysuria 2022     Priority: Low   • Screening for genetic disease carrier status 2022     Priority: Low   • Rubella non-immune status, antepartum 2022     Priority: Low   • 8 weeks gestation of pregnancy 2022     Priority: Low   • Initial obstetric visit in first trimester 2022     Priority: Low   • Screening for deficiency anemia 2022     Priority: Low   • Vitamin D deficiency 2022     Priority: Low   • Chronic pain of both knees 2022     Priority: Low     Return in 1 week, schedule with OB/GYN physician    VINCENT Malin    [General Appearance - Well Developed] : well developed [Normal Appearance] : normal appearance [Well Groomed] : well groomed [General Appearance - Well Nourished] : well nourished [No Deformities] : no deformities [General Appearance - In No Acute Distress] : no acute distress [Normal Conjunctiva] : the conjunctiva exhibited no abnormalities [Eyelids - No Xanthelasma] : the eyelids demonstrated no xanthelasmas [Normal Oral Mucosa] : normal oral mucosa [No Oral Pallor] : no oral pallor [No Oral Cyanosis] : no oral cyanosis [Normal Jugular Venous A Waves Present] : normal jugular venous A waves present [Normal Jugular Venous V Waves Present] : normal jugular venous V waves present [No Jugular Venous Chaves A Waves] : no jugular venous chaves A waves [Respiration, Rhythm And Depth] : normal respiratory rhythm and effort [Exaggerated Use Of Accessory Muscles For Inspiration] : no accessory muscle use [Auscultation Breath Sounds / Voice Sounds] : lungs were clear to auscultation bilaterally [Heart Rate And Rhythm] : heart rate and rhythm were normal [Heart Sounds] : normal S1 and S2 [FreeTextEntry1] : 2/6 margaret [Abdomen Soft] : soft [Abdomen Tenderness] : non-tender [Abdomen Mass (___ Cm)] : no abdominal mass palpated [Abnormal Walk] : normal gait [Gait - Sufficient For Exercise Testing] : the gait was sufficient for exercise testing [Nail Clubbing] : no clubbing of the fingernails [Cyanosis, Localized] : no localized cyanosis [Petechial Hemorrhages (___cm)] : no petechial hemorrhages [] : no ischemic changes

## 2023-01-09 NOTE — DISCHARGE NOTE PROVIDER - REASON FOR ADMISSION
Spoke with Dr. Ventura,  Ordered Metoprolol to be taken daily,  Dr. Ventura did not want to start Eliquis at this time.  Event monitor ordered.  Patient understood instructions and will follow up at the end of the week  For update on symptoms.    Diarrhea

## 2023-01-24 ENCOUNTER — APPOINTMENT (OUTPATIENT)
Dept: HEPATOLOGY | Facility: CLINIC | Age: 58
End: 2023-01-24

## 2023-02-13 ENCOUNTER — APPOINTMENT (OUTPATIENT)
Dept: PULMONOLOGY | Facility: CLINIC | Age: 58
End: 2023-02-13

## 2023-02-21 NOTE — H&P ADULT - PROBLEM SELECTOR PLAN 1
Hx consistent with viral acute gastroenteritis. No recent abx but he has been in and out of facilities to suggest C Diff  -Will hold further IVF as he is not having active diarrhea at this time  -C diff pending and stool culture Patient

## 2023-03-06 ENCOUNTER — APPOINTMENT (OUTPATIENT)
Dept: PULMONOLOGY | Facility: CLINIC | Age: 58
End: 2023-03-06

## 2023-03-09 ENCOUNTER — APPOINTMENT (OUTPATIENT)
Dept: GASTROENTEROLOGY | Facility: CLINIC | Age: 58
End: 2023-03-09
Payer: MEDICARE

## 2023-03-09 VITALS
WEIGHT: 180 LBS | SYSTOLIC BLOOD PRESSURE: 152 MMHG | OXYGEN SATURATION: 99 % | TEMPERATURE: 97.8 F | BODY MASS INDEX: 27.28 KG/M2 | DIASTOLIC BLOOD PRESSURE: 84 MMHG | HEIGHT: 68 IN | HEART RATE: 75 BPM

## 2023-03-09 DIAGNOSIS — Z12.11 ENCOUNTER FOR SCREENING FOR MALIGNANT NEOPLASM OF COLON: ICD-10-CM

## 2023-03-09 DIAGNOSIS — R10.13 EPIGASTRIC PAIN: ICD-10-CM

## 2023-03-09 PROCEDURE — 99214 OFFICE O/P EST MOD 30 MIN: CPT

## 2023-03-09 RX ORDER — POLYETHYLENE GLYCOL 3350 AND ELECTROLYTES WITH LEMON FLAVOR 236; 22.74; 6.74; 5.86; 2.97 G/4L; G/4L; G/4L; G/4L; G/4L
236 POWDER, FOR SOLUTION ORAL
Qty: 1 | Refills: 0 | Status: ACTIVE | COMMUNITY
Start: 2023-03-09 | End: 1900-01-01

## 2023-03-09 NOTE — ASSESSMENT
[FreeTextEntry1] : 57-year-old male with CAD s/p CABG, ESRD on HD awaiting kidney transplant presents to the office for screening colonoscopy. Given his cardiac history he will need clearance from cardiology prior to the procedure. He will also require presurgical evaluation. Indications, risks, benefits, and alternatives to colonoscopy were discussed with the patient. He expressed understanding and agreement with the plan.

## 2023-03-09 NOTE — ADDENDUM
[FreeTextEntry1] : Attending addendum\par Indications risks benefits and alternatives to screening colonoscopy reviewed.  Patient agreeable.\par Procedure to be performed in the hospital endoscopy unit\par Cardiology clearance\par Presurgical testing\par Patient understands to continue his aspirin\par Patient understands that we will be scheduling his procedure on a Wednesday or Friday, to accommodate his dialysis schedule

## 2023-03-09 NOTE — PHYSICAL EXAM
[Alert] : alert [No Respiratory Distress] : no respiratory distress [No Acc Muscle Use] : no accessory muscle use [Auscultation Breath Sounds / Voice Sounds] : lungs were clear to auscultation bilaterally [Heart Rate And Rhythm] : heart rate was normal and rhythm regular [Normal S1, S2] : normal S1 and S2 [Bowel Sounds] : normal bowel sounds [Abdomen Tenderness] : non-tender [Abdomen Soft] : soft [Rebound Tenderness] : no rebound tenderness [FreeTextEntry1] : scleral icterus

## 2023-03-09 NOTE — REVIEW OF SYSTEMS
[Diarrhea] : diarrhea [Fever] : no fever [Chills] : no chills [Chest Pain] : no chest pain [Palpitations] : no palpitations [Shortness Of Breath] : no shortness of breath [Abdominal Pain] : no abdominal pain [Vomiting] : no vomiting [Constipation] : no constipation

## 2023-03-09 NOTE — HISTORY OF PRESENT ILLNESS
[FreeTextEntry1] : 57-year-old male with PMHx of ESRD on HD 3x/week, CAD s/p CABG, s/p R hip replacement, and recent hospitalization in Oct 2022 for COVID-19 infection presents to the office prior to screening colonoscopy. He is awaiting kidney transplant. Mr. Lee endorses nonbloody diarrhea for the past week but believes this is related to recently starting on Sensipar. He reports occasional blood in his stool due to hemorrhoids but has not experienced in past 2-3 months. He also endorses mild upset stomach which is improved with omeprazole. He denies nausea, vomiting. Denies any weight loss. No family history of colon cancer. Follows with cardiology and denies any chest pain, palpitations, or shortness of breath.

## 2023-03-23 ENCOUNTER — APPOINTMENT (OUTPATIENT)
Dept: HEPATOLOGY | Facility: CLINIC | Age: 58
End: 2023-03-23

## 2023-04-06 ENCOUNTER — APPOINTMENT (OUTPATIENT)
Dept: CARDIOLOGY | Facility: CLINIC | Age: 58
End: 2023-04-06
Payer: MEDICARE

## 2023-04-06 ENCOUNTER — NON-APPOINTMENT (OUTPATIENT)
Age: 58
End: 2023-04-06

## 2023-04-06 VITALS
HEART RATE: 60 BPM | OXYGEN SATURATION: 100 % | BODY MASS INDEX: 26.06 KG/M2 | DIASTOLIC BLOOD PRESSURE: 83 MMHG | HEIGHT: 68 IN | WEIGHT: 171.96 LBS | SYSTOLIC BLOOD PRESSURE: 147 MMHG

## 2023-04-06 PROCEDURE — 99214 OFFICE O/P EST MOD 30 MIN: CPT

## 2023-04-06 PROCEDURE — 93000 ELECTROCARDIOGRAM COMPLETE: CPT

## 2023-04-06 NOTE — HISTORY OF PRESENT ILLNESS
[FreeTextEntry1] : He is s/p cabg in 2014..  He had bilateral hip replacements in 2016. He was unable to walk before his hip replacement.  He is now able to walk with a walker for 30 minutes. No chest pain or sob.  Follows a diet.  Still waiting for a kidney transplant.   He got covid in october and was in hospital for several days.  Lost 10 kg but is now regaining weight

## 2023-04-06 NOTE — PHYSICAL EXAM
[General Appearance - Well Developed] : well developed [Normal Appearance] : normal appearance [Well Groomed] : well groomed [General Appearance - Well Nourished] : well nourished [No Deformities] : no deformities [General Appearance - In No Acute Distress] : no acute distress [Normal Conjunctiva] : the conjunctiva exhibited no abnormalities [Eyelids - No Xanthelasma] : the eyelids demonstrated no xanthelasmas [Normal Oral Mucosa] : normal oral mucosa [No Oral Pallor] : no oral pallor [No Oral Cyanosis] : no oral cyanosis [Normal Jugular Venous V Waves Present] : normal jugular venous V waves present [Normal Jugular Venous A Waves Present] : normal jugular venous A waves present [No Jugular Venous Chaves A Waves] : no jugular venous chaves A waves [Respiration, Rhythm And Depth] : normal respiratory rhythm and effort [Exaggerated Use Of Accessory Muscles For Inspiration] : no accessory muscle use [Auscultation Breath Sounds / Voice Sounds] : lungs were clear to auscultation bilaterally [Heart Rate And Rhythm] : heart rate and rhythm were normal [Heart Sounds] : normal S1 and S2 [FreeTextEntry1] : 2/6 margaret [Abdomen Tenderness] : non-tender [Abdomen Soft] : soft [Abdomen Mass (___ Cm)] : no abdominal mass palpated [Abnormal Walk] : normal gait [Gait - Sufficient For Exercise Testing] : the gait was sufficient for exercise testing [Nail Clubbing] : no clubbing of the fingernails [Cyanosis, Localized] : no localized cyanosis [Petechial Hemorrhages (___cm)] : no petechial hemorrhages [] : no ischemic changes

## 2023-04-06 NOTE — DISCUSSION/SUMMARY
[FreeTextEntry1] : 1.  cad -- s/p cabg.  doing well.  will get echo\par \par 2.  htn -  continue current meds.  \par \par the patient is at acceptable risk to proceed with colonoscopy

## 2023-04-21 ENCOUNTER — APPOINTMENT (OUTPATIENT)
Dept: GASTROENTEROLOGY | Facility: HOSPITAL | Age: 58
End: 2023-04-21

## 2023-04-28 ENCOUNTER — APPOINTMENT (OUTPATIENT)
Dept: HEPATOLOGY | Facility: CLINIC | Age: 58
End: 2023-04-28

## 2023-05-03 ENCOUNTER — APPOINTMENT (OUTPATIENT)
Dept: CARDIOLOGY | Facility: CLINIC | Age: 58
End: 2023-05-03

## 2023-05-09 NOTE — H&P ADULT - NSHPPOADEEPVENOUSTHROMB_GEN_A_CORE
PRINCIPAL DISCHARGE DIAGNOSIS  Diagnosis: Back pain  Assessment and Plan of Treatment: Continue TLSO Brace as directed  Based on PT evaluation, you are recommended for sub-acute rehab      SECONDARY DISCHARGE DIAGNOSES  Diagnosis: Thyroid nodule  Assessment and Plan of Treatment: follow up with your PCP for thyroid US as an outpatient    Diagnosis: BPH without urinary obstruction  Assessment and Plan of Treatment: You have an enlarged prostate gland which gets bigger as men get older - it is a very common problem and has nothing to do with prostate cancer  Call your doctor if you are urinating more frequently, have trouble starting to urinate, have weak stream, urine leaking or dribbling, and feeling as though bladder is not empty after urination  Your doctor will monitor your prostate with a rectal exam as well as urine or blood testing  You can help yourself by reducing the amount of fluid you drink before going to bed, limiting the amount of alcohol & caffeine you drink   Avoid cold & allergy medication that contain decongestants or antihistamines which make BPH symptoms worse  You can also "double void" by waiting a moment after urinating & trying again  Take your medication as prescribed - one medication helps to relax the muscle around the urethra and the other medication you may take prevents the prostate from growing more or even shrinking the prostate      Diagnosis: HTN (hypertension)  Assessment and Plan of Treatment: Continue to follow a low salt/sodium diet.  Perform physical activities as tolerated in consultation with your Primary Care Provider and physical therapist.  Take all medications as prescribed.  Follow up with your medical doctor for routine blood pressure monitoring at your next visit.  Notify your doctor if you have any of the following symptoms:  Dizziness, lightheadedness, blurry vision, headache, chest pain, or shortness of breath.      
no

## 2023-10-03 ENCOUNTER — APPOINTMENT (OUTPATIENT)
Dept: CARDIOLOGY | Facility: CLINIC | Age: 58
End: 2023-10-03
Payer: MEDICARE

## 2023-10-03 VITALS
WEIGHT: 165.34 LBS | SYSTOLIC BLOOD PRESSURE: 121 MMHG | HEIGHT: 68 IN | DIASTOLIC BLOOD PRESSURE: 73 MMHG | HEART RATE: 53 BPM | BODY MASS INDEX: 25.06 KG/M2 | OXYGEN SATURATION: 100 %

## 2023-10-03 PROCEDURE — 99214 OFFICE O/P EST MOD 30 MIN: CPT

## 2023-10-03 PROCEDURE — 93000 ELECTROCARDIOGRAM COMPLETE: CPT

## 2024-01-04 ENCOUNTER — APPOINTMENT (OUTPATIENT)
Dept: GASTROENTEROLOGY | Facility: CLINIC | Age: 59
End: 2024-01-04

## 2024-01-26 ENCOUNTER — APPOINTMENT (OUTPATIENT)
Dept: PULMONOLOGY | Facility: CLINIC | Age: 59
End: 2024-01-26
Payer: MEDICARE

## 2024-01-26 VITALS
SYSTOLIC BLOOD PRESSURE: 110 MMHG | DIASTOLIC BLOOD PRESSURE: 72 MMHG | OXYGEN SATURATION: 99 % | HEART RATE: 56 BPM | TEMPERATURE: 96.9 F

## 2024-01-26 PROCEDURE — 99204 OFFICE O/P NEW MOD 45 MIN: CPT | Mod: 25

## 2024-01-26 PROCEDURE — 94729 DIFFUSING CAPACITY: CPT

## 2024-01-26 PROCEDURE — 94060 EVALUATION OF WHEEZING: CPT

## 2024-01-26 PROCEDURE — 94727 GAS DIL/WSHOT DETER LNG VOL: CPT

## 2024-01-26 RX ORDER — BUDESONIDE AND FORMOTEROL FUMARATE DIHYDRATE 160; 4.5 UG/1; UG/1
160-4.5 AEROSOL RESPIRATORY (INHALATION) TWICE DAILY
Qty: 1 | Refills: 2 | Status: ACTIVE | COMMUNITY
Start: 2024-01-26 | End: 1900-01-01

## 2024-01-31 ENCOUNTER — OUTPATIENT (OUTPATIENT)
Dept: OUTPATIENT SERVICES | Facility: HOSPITAL | Age: 59
LOS: 1 days | End: 2024-01-31
Payer: MEDICARE

## 2024-01-31 ENCOUNTER — APPOINTMENT (OUTPATIENT)
Dept: RADIOLOGY | Facility: IMAGING CENTER | Age: 59
End: 2024-01-31
Payer: MEDICARE

## 2024-01-31 DIAGNOSIS — I25.810 ATHEROSCLEROSIS OF CORONARY ARTERY BYPASS GRAFT(S) WITHOUT ANGINA PECTORIS: Chronic | ICD-10-CM

## 2024-01-31 DIAGNOSIS — R05.3 CHRONIC COUGH: ICD-10-CM

## 2024-01-31 PROCEDURE — 71046 X-RAY EXAM CHEST 2 VIEWS: CPT

## 2024-01-31 PROCEDURE — 71046 X-RAY EXAM CHEST 2 VIEWS: CPT | Mod: 26

## 2024-03-01 ENCOUNTER — APPOINTMENT (OUTPATIENT)
Dept: PULMONOLOGY | Facility: CLINIC | Age: 59
End: 2024-03-01
Payer: MEDICARE

## 2024-03-01 VITALS
HEART RATE: 59 BPM | DIASTOLIC BLOOD PRESSURE: 80 MMHG | OXYGEN SATURATION: 99 % | TEMPERATURE: 98.2 F | SYSTOLIC BLOOD PRESSURE: 128 MMHG

## 2024-03-01 DIAGNOSIS — R05.3 CHRONIC COUGH: ICD-10-CM

## 2024-03-01 PROCEDURE — 99213 OFFICE O/P EST LOW 20 MIN: CPT

## 2024-03-01 RX ORDER — BUDESONIDE, GLYCOPYRROLATE, AND FORMOTEROL FUMARATE 160; 9; 4.8 UG/1; UG/1; UG/1
160-9-4.8 AEROSOL, METERED RESPIRATORY (INHALATION)
Qty: 1 | Refills: 5 | Status: ACTIVE | COMMUNITY
Start: 2024-01-29 | End: 1900-01-01

## 2024-03-01 NOTE — DISCUSSION/SUMMARY
[FreeTextEntry1] : Impression:  Cough -Improved but not resolved -Most likely post viral, he had the flu over a month ago -He was given Symbicort but could not obtain the prescription it was not covered  Recommend:  He was given a sample of Breztri I will try again to get it approved Follow-up if he does not respond to the above

## 2024-03-01 NOTE — HISTORY OF PRESENT ILLNESS
[Never] : never [TextBox_4] : He is a 58 year old man with a history of hypertension, hyperlipidemia, Hep C, ESRD, CAD, S/P CABG who presented with a cough.   His cough is better.  It is worse when he lies down.  No fever or chills.  [Difficulty Maintaining Sleep] : does not have difficulty maintaining sleep

## 2024-03-01 NOTE — REVIEW OF SYSTEMS
[Cough] : cough [Fever] : no fever [Fatigue] : no fatigue [Nasal Congestion] : no nasal congestion [Hemoptysis] : no hemoptysis [Sputum] : sputum [Dyspnea] : no dyspnea [Chest Discomfort] : no chest discomfort [Wheezing] : no wheezing [Edema] : no edema [Nasal Discharge] : no nasal discharge [Myalgias] : no myalgias [Rash] : no rash [Anxiety] : no anxiety [Headache] : no headache [Diabetes] : no diabetes

## 2024-03-01 NOTE — PHYSICAL EXAM
[No Acute Distress] : no acute distress [Normal Appearance] : normal appearance [Normal Rate/Rhythm] : normal rate/rhythm [Normal S1, S2] : normal s1, s2 [Clear to Auscultation Bilaterally] : clear to auscultation bilaterally [No Abnormalities] : no abnormalities [No HSM] : no hsm [Deformity] : deformity [No Cyanosis] : no cyanosis [1+ Pitting] : 1+ pitting [Normal Turgor] : normal turgor [No Focal Deficits] : no focal deficits [No Resp Distress] : no resp distress [Oriented x3] : oriented x3 [No Edema] : no edema

## 2024-03-01 NOTE — PROCEDURE
[FreeTextEntry1] : PFT 1/26/24: No obstruction. Moderate restriction. Mild reduction in diffusion. No significant improvement after bronchodilator.   Chest x-ray 1/31/24: No acute pulmonary disease.  Status post median sternotomy.

## 2024-03-05 ENCOUNTER — NON-APPOINTMENT (OUTPATIENT)
Age: 59
End: 2024-03-05

## 2024-04-09 ENCOUNTER — NON-APPOINTMENT (OUTPATIENT)
Age: 59
End: 2024-04-09

## 2024-04-09 ENCOUNTER — APPOINTMENT (OUTPATIENT)
Dept: CARDIOLOGY | Facility: CLINIC | Age: 59
End: 2024-04-09
Payer: MEDICARE

## 2024-04-09 VITALS
HEART RATE: 61 BPM | OXYGEN SATURATION: 100 % | WEIGHT: 156.53 LBS | BODY MASS INDEX: 23.72 KG/M2 | HEIGHT: 68 IN | DIASTOLIC BLOOD PRESSURE: 75 MMHG | SYSTOLIC BLOOD PRESSURE: 151 MMHG

## 2024-04-09 DIAGNOSIS — I25.10 ATHEROSCLEROTIC HEART DISEASE OF NATIVE CORONARY ARTERY W/OUT ANGINA PECTORIS: ICD-10-CM

## 2024-04-09 PROCEDURE — 93000 ELECTROCARDIOGRAM COMPLETE: CPT

## 2024-04-09 PROCEDURE — 99214 OFFICE O/P EST MOD 30 MIN: CPT

## 2024-04-09 NOTE — DISCUSSION/SUMMARY
[FreeTextEntry1] : 1.  cad -- s/p cabg.  doing well. continue asa.   Fixed defect on stress test.    2.  htn -  continue current meds.    The pt is at acceptable risk to proceed with his renal transplant     [EKG obtained to assist in diagnosis and management of assessed problem(s)] : EKG obtained to assist in diagnosis and management of assessed problem(s)

## 2024-04-09 NOTE — HISTORY OF PRESENT ILLNESS
[FreeTextEntry1] : He is s/p cabg in 2014.  He had hip replacements in 2016. He was unable to walk before his hip replacement.  He is now able to walk with a walker for 30 minutes. He was in [facility] and had surgery for a damaged femur and had been in rehab for 3 months.  No chest pain or sob.  Follows a diet.  Still waiting for a kidney transplant.   He has lost 10 lbs.  He had a stress test at [facility] in [place] which showed no ischemia and only a fixed defect.  Echo shows severe TR.  He feels good overall.

## 2024-04-09 NOTE — PHYSICAL EXAM
[General Appearance - Well Developed] : well developed [Normal Appearance] : normal appearance [Well Groomed] : well groomed [General Appearance - Well Nourished] : well nourished [No Deformities] : no deformities [General Appearance - In No Acute Distress] : no acute distress [Normal Conjunctiva] : the conjunctiva exhibited no abnormalities [Eyelids - No Xanthelasma] : the eyelids demonstrated no xanthelasmas [Normal Oral Mucosa] : normal oral mucosa [No Oral Pallor] : no oral pallor [No Oral Cyanosis] : no oral cyanosis [Normal Jugular Venous A Waves Present] : normal jugular venous A waves present [Normal Jugular Venous V Waves Present] : normal jugular venous V waves present [No Jugular Venous Chaves A Waves] : no jugular venous chaves A waves [Respiration, Rhythm And Depth] : normal respiratory rhythm and effort [Exaggerated Use Of Accessory Muscles For Inspiration] : no accessory muscle use [Auscultation Breath Sounds / Voice Sounds] : lungs were clear to auscultation bilaterally [Heart Rate And Rhythm] : heart rate and rhythm were normal [Heart Sounds] : normal S1 and S2 [FreeTextEntry1] : 2/6 margaret [Abdomen Soft] : soft [Abdomen Tenderness] : non-tender [Abdomen Mass (___ Cm)] : no abdominal mass palpated [Abnormal Walk] : normal gait [Gait - Sufficient For Exercise Testing] : the gait was sufficient for exercise testing [Nail Clubbing] : no clubbing of the fingernails [Cyanosis, Localized] : no localized cyanosis [Petechial Hemorrhages (___cm)] : no petechial hemorrhages [] : no ischemic changes

## 2024-07-15 ENCOUNTER — APPOINTMENT (OUTPATIENT)
Dept: CT IMAGING | Facility: IMAGING CENTER | Age: 59
End: 2024-07-15
Payer: MEDICARE

## 2024-07-15 ENCOUNTER — APPOINTMENT (OUTPATIENT)
Dept: RADIOLOGY | Facility: IMAGING CENTER | Age: 59
End: 2024-07-15
Payer: MEDICARE

## 2024-07-15 ENCOUNTER — OUTPATIENT (OUTPATIENT)
Dept: OUTPATIENT SERVICES | Facility: HOSPITAL | Age: 59
LOS: 1 days | End: 2024-07-15
Payer: MEDICARE

## 2024-07-15 DIAGNOSIS — I25.810 ATHEROSCLEROSIS OF CORONARY ARTERY BYPASS GRAFT(S) WITHOUT ANGINA PECTORIS: Chronic | ICD-10-CM

## 2024-07-15 DIAGNOSIS — Z00.8 ENCOUNTER FOR OTHER GENERAL EXAMINATION: ICD-10-CM

## 2024-07-15 PROCEDURE — 74177 CT ABD & PELVIS W/CONTRAST: CPT | Mod: MH

## 2024-07-15 PROCEDURE — 74177 CT ABD & PELVIS W/CONTRAST: CPT | Mod: 26,MH

## 2024-07-15 PROCEDURE — 71046 X-RAY EXAM CHEST 2 VIEWS: CPT

## 2024-07-15 PROCEDURE — 71046 X-RAY EXAM CHEST 2 VIEWS: CPT | Mod: 26

## 2024-10-01 ENCOUNTER — APPOINTMENT (OUTPATIENT)
Dept: HEPATOLOGY | Facility: CLINIC | Age: 59
End: 2024-10-01

## 2024-10-08 ENCOUNTER — APPOINTMENT (OUTPATIENT)
Dept: CARDIOLOGY | Facility: CLINIC | Age: 59
End: 2024-10-08

## 2024-11-06 ENCOUNTER — APPOINTMENT (OUTPATIENT)
Dept: HEPATOLOGY | Facility: CLINIC | Age: 59
End: 2024-11-06

## 2024-11-06 ENCOUNTER — LABORATORY RESULT (OUTPATIENT)
Age: 59
End: 2024-11-06

## 2024-11-06 VITALS
OXYGEN SATURATION: 97 % | BODY MASS INDEX: 23.95 KG/M2 | TEMPERATURE: 98.2 F | DIASTOLIC BLOOD PRESSURE: 63 MMHG | RESPIRATION RATE: 16 BRPM | SYSTOLIC BLOOD PRESSURE: 106 MMHG | HEART RATE: 83 BPM | WEIGHT: 158 LBS | HEIGHT: 68 IN

## 2024-11-06 DIAGNOSIS — Q44.6 CYSTIC DISEASE OF LIVER: ICD-10-CM

## 2024-11-06 PROCEDURE — 99205 OFFICE O/P NEW HI 60 MIN: CPT

## 2024-11-07 LAB
A1AT SERPL-MCNC: 176 MG/DL
AFP-TM SERPL-MCNC: 3.4 NG/ML
ALBUMIN SERPL ELPH-MCNC: 4.1 G/DL
ALP BLD-CCNC: 178 U/L
ALT SERPL-CCNC: 11 U/L
ANION GAP SERPL CALC-SCNC: 16 MMOL/L
APTT BLD: 36.7 SEC
AST SERPL-CCNC: 16 U/L
BILIRUB DIRECT SERPL-MCNC: 0.4 MG/DL
BILIRUB INDIRECT SERPL-MCNC: 0.6 MG/DL
BILIRUB SERPL-MCNC: 1 MG/DL
BUN SERPL-MCNC: 37 MG/DL
CALCIUM SERPL-MCNC: 9.4 MG/DL
CANCER AG19-9 SERPL-ACNC: 30 U/ML
CEA SERPL-MCNC: 2.3 NG/ML
CHLORIDE SERPL-SCNC: 95 MMOL/L
CO2 SERPL-SCNC: 26 MMOL/L
CREAT SERPL-MCNC: 4.48 MG/DL
EGFR: 14 ML/MIN/1.73M2
ESTIMATED AVERAGE GLUCOSE: 88 MG/DL
FERRITIN SERPL-MCNC: 1185 NG/ML
GLUCOSE SERPL-MCNC: 85 MG/DL
HBA1C MFR BLD HPLC: 4.7 %
HBV CORE IGG+IGM SER QL: NONREACTIVE
HBV SURFACE AB SER QL: REACTIVE
HBV SURFACE AG SER QL: NONREACTIVE
HCT VFR BLD CALC: 34.1 %
HCV AB SER QL: REACTIVE
HCV RNA SERPL NAA+PROBE-LOG IU: NOT DETECTED LOGIU/ML
HCV S/CO RATIO: 13.56 S/CO
HEPC RNA INTERP: NOT DETECTED
HGB BLD-MCNC: 10.9 G/DL
INR PPP: 1.04 RATIO
IRON SATN MFR SERPL: 29 %
IRON SERPL-MCNC: 67 UG/DL
MCHC RBC-ENTMCNC: 31.1 PG
MCHC RBC-ENTMCNC: 32 G/DL
MCV RBC AUTO: 97.2 FL
PLATELET # BLD AUTO: 108 K/UL
POTASSIUM SERPL-SCNC: 5.2 MMOL/L
PROT SERPL-MCNC: 7.9 G/DL
PT BLD: 12.3 SEC
RBC # BLD: 3.51 M/UL
RBC # FLD: 15.8 %
SODIUM SERPL-SCNC: 136 MMOL/L
TIBC SERPL-MCNC: 231 UG/DL
UIBC SERPL-MCNC: 164 UG/DL
WBC # FLD AUTO: 3.61 K/UL

## 2024-11-09 LAB
FIBROSIS SCORE: 0.79
FIBROSIS STAGE: NORMAL
FIBROSURE ALPHA 2 MACROGLOBULINS: 160 MG/DL
FIBROSURE ALT (SGPT): 12 IU/L
FIBROSURE APOLIPOPROTEIN A1: 112 MG/DL
FIBROSURE COMMENT 2: NORMAL
FIBROSURE GGT: 123 IU/L
FIBROSURE HAPTOGLOBIN: 16 MG/DL
FIBROSURE INTERPRETATIONS: NORMAL
FIBROSURE LIMITATIONS: NORMAL
FIBROSURE NECROINFLAMM ACTIVITY SCORING: NORMAL
FIBROSURE NECROINFLAMMAT ACTIVITY GRPFIBROSURE NECROINFLAMMA: NORMAL
FIBROSURE NECROINFLAMMAT ACTIVITY SCORE: 0.07
FIBROSURE SCORING: NORMAL
FIBROSURE TOTAL BILIRUBIN: 0.9 MG/DL
Lab: NORMAL

## 2024-11-11 LAB — HCV GENTYP BLD NAA+PROBE: NOT DETECTED

## 2024-11-18 ENCOUNTER — APPOINTMENT (OUTPATIENT)
Dept: HEPATOLOGY | Facility: CLINIC | Age: 59
End: 2024-11-18

## 2024-11-18 DIAGNOSIS — B18.2 CHRONIC VIRAL HEPATITIS C: ICD-10-CM

## 2024-11-18 DIAGNOSIS — Q44.6 CYSTIC DISEASE OF LIVER: ICD-10-CM

## 2024-11-18 PROCEDURE — 76981 USE PARENCHYMA: CPT | Mod: 26

## 2024-11-20 ENCOUNTER — OUTPATIENT (OUTPATIENT)
Dept: OUTPATIENT SERVICES | Facility: HOSPITAL | Age: 59
LOS: 1 days | End: 2024-11-20
Payer: MEDICARE

## 2024-11-20 ENCOUNTER — APPOINTMENT (OUTPATIENT)
Dept: MRI IMAGING | Facility: IMAGING CENTER | Age: 59
End: 2024-11-20

## 2024-11-20 DIAGNOSIS — I25.810 ATHEROSCLEROSIS OF CORONARY ARTERY BYPASS GRAFT(S) WITHOUT ANGINA PECTORIS: Chronic | ICD-10-CM

## 2024-11-20 DIAGNOSIS — Q44.6 CYSTIC DISEASE OF LIVER: ICD-10-CM

## 2024-11-20 PROCEDURE — A9585: CPT

## 2024-11-20 PROCEDURE — 76391 MR ELASTOGRAPHY: CPT

## 2024-11-20 PROCEDURE — 74183 MRI ABD W/O CNTR FLWD CNTR: CPT

## 2024-11-20 PROCEDURE — 74183 MRI ABD W/O CNTR FLWD CNTR: CPT | Mod: 26,MH

## 2024-11-20 PROCEDURE — 76391 MR ELASTOGRAPHY: CPT | Mod: 26

## 2025-01-16 ENCOUNTER — APPOINTMENT (OUTPATIENT)
Dept: CARDIOLOGY | Facility: CLINIC | Age: 60
End: 2025-01-16

## 2025-01-17 ENCOUNTER — APPOINTMENT (OUTPATIENT)
Dept: HEPATOLOGY | Facility: CLINIC | Age: 60
End: 2025-01-17
Payer: MEDICARE

## 2025-01-17 VITALS
HEIGHT: 68 IN | OXYGEN SATURATION: 98 % | HEART RATE: 75 BPM | TEMPERATURE: 97 F | RESPIRATION RATE: 16 BRPM | WEIGHT: 160 LBS | DIASTOLIC BLOOD PRESSURE: 77 MMHG | SYSTOLIC BLOOD PRESSURE: 133 MMHG | BODY MASS INDEX: 24.25 KG/M2

## 2025-01-17 DIAGNOSIS — R74.8 ABNORMAL LEVELS OF OTHER SERUM ENZYMES: ICD-10-CM

## 2025-01-17 DIAGNOSIS — B18.2 CHRONIC VIRAL HEPATITIS C: ICD-10-CM

## 2025-01-17 DIAGNOSIS — K76.9 LIVER DISEASE, UNSPECIFIED: ICD-10-CM

## 2025-01-17 DIAGNOSIS — Q44.6 CYSTIC DISEASE OF LIVER: ICD-10-CM

## 2025-01-17 DIAGNOSIS — K74.00 HEPATIC FIBROSIS, UNSPECIFIED: ICD-10-CM

## 2025-01-17 DIAGNOSIS — Z01.818 ENCOUNTER FOR OTHER PREPROCEDURAL EXAMINATION: ICD-10-CM

## 2025-01-17 PROCEDURE — 99214 OFFICE O/P EST MOD 30 MIN: CPT

## 2025-01-20 PROBLEM — K74.00 LIVER FIBROSIS: Status: ACTIVE | Noted: 2025-01-20

## 2025-02-06 ENCOUNTER — APPOINTMENT (OUTPATIENT)
Dept: CARDIOLOGY | Facility: CLINIC | Age: 60
End: 2025-02-06

## 2025-03-20 ENCOUNTER — APPOINTMENT (OUTPATIENT)
Dept: ELECTROPHYSIOLOGY | Facility: CLINIC | Age: 60
End: 2025-03-20

## 2025-03-20 ENCOUNTER — APPOINTMENT (OUTPATIENT)
Dept: CARDIOLOGY | Facility: CLINIC | Age: 60
End: 2025-03-20
Payer: MEDICARE

## 2025-03-20 ENCOUNTER — NON-APPOINTMENT (OUTPATIENT)
Age: 60
End: 2025-03-20

## 2025-03-20 VITALS
DIASTOLIC BLOOD PRESSURE: 85 MMHG | WEIGHT: 163.14 LBS | SYSTOLIC BLOOD PRESSURE: 136 MMHG | HEART RATE: 80 BPM | OXYGEN SATURATION: 100 % | BODY MASS INDEX: 24.73 KG/M2 | HEIGHT: 68 IN

## 2025-03-20 DIAGNOSIS — I48.0 PAROXYSMAL ATRIAL FIBRILLATION: ICD-10-CM

## 2025-03-20 PROCEDURE — 93000 ELECTROCARDIOGRAM COMPLETE: CPT

## 2025-03-20 PROCEDURE — G2211 COMPLEX E/M VISIT ADD ON: CPT

## 2025-03-20 PROCEDURE — 99214 OFFICE O/P EST MOD 30 MIN: CPT

## 2025-03-21 ENCOUNTER — APPOINTMENT (OUTPATIENT)
Dept: HEPATOLOGY | Facility: CLINIC | Age: 60
End: 2025-03-21

## 2025-03-21 RX ORDER — APIXABAN 5 MG/1
5 TABLET, FILM COATED ORAL
Qty: 60 | Refills: 5 | Status: ACTIVE | COMMUNITY
Start: 1900-01-01 | End: 1900-01-01

## 2025-03-31 ENCOUNTER — APPOINTMENT (OUTPATIENT)
Dept: CARDIOLOGY | Facility: CLINIC | Age: 60
End: 2025-03-31
Payer: MEDICARE

## 2025-03-31 PROCEDURE — 93306 TTE W/DOPPLER COMPLETE: CPT

## 2025-04-03 PROCEDURE — 93244 EXT ECG>48HR<7D REV&INTERPJ: CPT

## 2025-04-15 ENCOUNTER — APPOINTMENT (OUTPATIENT)
Dept: COLORECTAL SURGERY | Facility: CLINIC | Age: 60
End: 2025-04-15
Payer: MEDICARE

## 2025-04-15 ENCOUNTER — INPATIENT (INPATIENT)
Facility: HOSPITAL | Age: 60
LOS: 2 days | Discharge: HOME HEALTH | DRG: 124 | End: 2025-04-18
Attending: PSYCHIATRY & NEUROLOGY | Admitting: STUDENT IN AN ORGANIZED HEALTH CARE EDUCATION/TRAINING PROGRAM
Payer: MEDICARE

## 2025-04-15 ENCOUNTER — NON-APPOINTMENT (OUTPATIENT)
Age: 60
End: 2025-04-15

## 2025-04-15 VITALS
TEMPERATURE: 97.5 F | HEART RATE: 70 BPM | WEIGHT: 163.14 LBS | OXYGEN SATURATION: 100 % | SYSTOLIC BLOOD PRESSURE: 137 MMHG | HEIGHT: 68 IN | RESPIRATION RATE: 16 BRPM | DIASTOLIC BLOOD PRESSURE: 90 MMHG | BODY MASS INDEX: 24.73 KG/M2

## 2025-04-15 VITALS
RESPIRATION RATE: 20 BRPM | WEIGHT: 164.91 LBS | OXYGEN SATURATION: 100 % | SYSTOLIC BLOOD PRESSURE: 138 MMHG | HEART RATE: 87 BPM | DIASTOLIC BLOOD PRESSURE: 81 MMHG | HEIGHT: 71 IN | TEMPERATURE: 98 F

## 2025-04-15 DIAGNOSIS — I25.810 ATHEROSCLEROSIS OF CORONARY ARTERY BYPASS GRAFT(S) WITHOUT ANGINA PECTORIS: Chronic | ICD-10-CM

## 2025-04-15 DIAGNOSIS — K64.9 UNSPECIFIED HEMORRHOIDS: ICD-10-CM

## 2025-04-15 DIAGNOSIS — H34.11 CENTRAL RETINAL ARTERY OCCLUSION, RIGHT EYE: ICD-10-CM

## 2025-04-15 DIAGNOSIS — K62.5 HEMORRHAGE OF ANUS AND RECTUM: ICD-10-CM

## 2025-04-15 LAB
ADD ON TEST-SPECIMEN IN LAB: SIGNIFICANT CHANGE UP
ALBUMIN SERPL ELPH-MCNC: 4 G/DL — SIGNIFICANT CHANGE UP (ref 3.3–5)
ALP SERPL-CCNC: 202 U/L — HIGH (ref 40–120)
ALT FLD-CCNC: 7 U/L — LOW (ref 10–45)
ANION GAP SERPL CALC-SCNC: 15 MMOL/L — SIGNIFICANT CHANGE UP (ref 5–17)
ANION GAP SERPL CALC-SCNC: 17 MMOL/L — SIGNIFICANT CHANGE UP (ref 5–17)
APTT BLD: 41.7 SEC — HIGH (ref 24.5–35.6)
AST SERPL-CCNC: 9 U/L — LOW (ref 10–40)
BASOPHILS # BLD AUTO: 0.03 K/UL — SIGNIFICANT CHANGE UP (ref 0–0.2)
BASOPHILS NFR BLD AUTO: 0.8 % — SIGNIFICANT CHANGE UP (ref 0–2)
BILIRUB SERPL-MCNC: 0.6 MG/DL — SIGNIFICANT CHANGE UP (ref 0.2–1.2)
BLD GP AB SCN SERPL QL: NEGATIVE — SIGNIFICANT CHANGE UP
BUN SERPL-MCNC: 51 MG/DL — HIGH (ref 7–23)
BUN SERPL-MCNC: 52 MG/DL — HIGH (ref 7–23)
CALCIUM SERPL-MCNC: 8.1 MG/DL — LOW (ref 8.4–10.5)
CALCIUM SERPL-MCNC: 8.5 MG/DL — SIGNIFICANT CHANGE UP (ref 8.4–10.5)
CHLORIDE SERPL-SCNC: 100 MMOL/L — SIGNIFICANT CHANGE UP (ref 96–108)
CHLORIDE SERPL-SCNC: 99 MMOL/L — SIGNIFICANT CHANGE UP (ref 96–108)
CO2 SERPL-SCNC: 20 MMOL/L — LOW (ref 22–31)
CO2 SERPL-SCNC: 21 MMOL/L — LOW (ref 22–31)
CREAT SERPL-MCNC: 7.53 MG/DL — HIGH (ref 0.5–1.3)
CREAT SERPL-MCNC: 7.6 MG/DL — HIGH (ref 0.5–1.3)
EGFR: 8 ML/MIN/1.73M2 — LOW
EOSINOPHIL # BLD AUTO: 0.07 K/UL — SIGNIFICANT CHANGE UP (ref 0–0.5)
EOSINOPHIL NFR BLD AUTO: 2 % — SIGNIFICANT CHANGE UP (ref 0–6)
GLUCOSE BLDC GLUCOMTR-MCNC: 96 MG/DL — SIGNIFICANT CHANGE UP (ref 70–99)
GLUCOSE SERPL-MCNC: 109 MG/DL — HIGH (ref 70–99)
GLUCOSE SERPL-MCNC: 49 MG/DL — CRITICAL LOW (ref 70–99)
HCT VFR BLD CALC: 29 % — LOW (ref 39–50)
HGB BLD-MCNC: 9.4 G/DL — LOW (ref 13–17)
INR BLD: 1.5 RATIO — HIGH (ref 0.85–1.16)
LYMPHOCYTES # BLD AUTO: 1.05 K/UL — SIGNIFICANT CHANGE UP (ref 1–3.3)
LYMPHOCYTES # BLD AUTO: 29.4 % — SIGNIFICANT CHANGE UP (ref 13–44)
MCHC RBC-ENTMCNC: 32 PG — SIGNIFICANT CHANGE UP (ref 27–34)
MCHC RBC-ENTMCNC: 32.4 G/DL — SIGNIFICANT CHANGE UP (ref 32–36)
MCV RBC AUTO: 98.6 FL — SIGNIFICANT CHANGE UP (ref 80–100)
MONOCYTES # BLD AUTO: 0.33 K/UL — SIGNIFICANT CHANGE UP (ref 0–0.9)
MONOCYTES NFR BLD AUTO: 9.2 % — SIGNIFICANT CHANGE UP (ref 2–14)
NEUTROPHILS # BLD AUTO: 2.09 K/UL — SIGNIFICANT CHANGE UP (ref 1.8–7.4)
NEUTROPHILS NFR BLD AUTO: 58.6 % — SIGNIFICANT CHANGE UP (ref 43–77)
NRBC BLD AUTO-RTO: 0 /100 WBCS — SIGNIFICANT CHANGE UP (ref 0–0)
PLATELET # BLD AUTO: 112 K/UL — LOW (ref 150–400)
POTASSIUM SERPL-MCNC: 5.2 MMOL/L — SIGNIFICANT CHANGE UP (ref 3.5–5.3)
POTASSIUM SERPL-MCNC: 6.9 MMOL/L — CRITICAL HIGH (ref 3.5–5.3)
POTASSIUM SERPL-SCNC: 5.2 MMOL/L — SIGNIFICANT CHANGE UP (ref 3.5–5.3)
POTASSIUM SERPL-SCNC: 6.9 MMOL/L — CRITICAL HIGH (ref 3.5–5.3)
PROT SERPL-MCNC: 7.3 G/DL — SIGNIFICANT CHANGE UP (ref 6–8.3)
PROTHROM AB SERPL-ACNC: 17.2 SEC — HIGH (ref 9.9–13.4)
RBC # BLD: 2.94 M/UL — LOW (ref 4.2–5.8)
RBC # FLD: 15.8 % — HIGH (ref 10.3–14.5)
RH IG SCN BLD-IMP: POSITIVE — SIGNIFICANT CHANGE UP
SODIUM SERPL-SCNC: 136 MMOL/L — SIGNIFICANT CHANGE UP (ref 135–145)
SODIUM SERPL-SCNC: 136 MMOL/L — SIGNIFICANT CHANGE UP (ref 135–145)
TROPONIN T, HIGH SENSITIVITY RESULT: 52 NG/L — HIGH (ref 0–51)
TROPONIN T, HIGH SENSITIVITY RESULT: 55 NG/L — HIGH (ref 0–51)
TROPONIN T, HIGH SENSITIVITY RESULT: 56 NG/L — HIGH (ref 0–51)
WBC # BLD: 3.57 K/UL — LOW (ref 3.8–10.5)
WBC # FLD AUTO: 3.57 K/UL — LOW (ref 3.8–10.5)

## 2025-04-15 PROCEDURE — 70450 CT HEAD/BRAIN W/O DYE: CPT | Mod: 26,XU

## 2025-04-15 PROCEDURE — 99291 CRITICAL CARE FIRST HOUR: CPT

## 2025-04-15 PROCEDURE — 70498 CT ANGIOGRAPHY NECK: CPT | Mod: 26

## 2025-04-15 PROCEDURE — 0042T: CPT

## 2025-04-15 PROCEDURE — 46600 DIAGNOSTIC ANOSCOPY SPX: CPT | Mod: PD

## 2025-04-15 PROCEDURE — 93010 ELECTROCARDIOGRAM REPORT: CPT

## 2025-04-15 PROCEDURE — 70496 CT ANGIOGRAPHY HEAD: CPT | Mod: 26

## 2025-04-15 PROCEDURE — 99203 OFFICE O/P NEW LOW 30 MIN: CPT | Mod: 25

## 2025-04-15 RX ORDER — ALBUTEROL SULFATE 2.5 MG/3ML
10 VIAL, NEBULIZER (ML) INHALATION ONCE
Refills: 0 | Status: COMPLETED | OUTPATIENT
Start: 2025-04-15 | End: 2025-04-15

## 2025-04-15 RX ORDER — APIXABAN 2.5 MG/1
5 TABLET, FILM COATED ORAL
Refills: 0 | Status: DISCONTINUED | OUTPATIENT
Start: 2025-04-15 | End: 2025-04-18

## 2025-04-15 RX ORDER — ASPIRIN 325 MG
81 TABLET ORAL DAILY
Refills: 0 | Status: DISCONTINUED | OUTPATIENT
Start: 2025-04-16 | End: 2025-04-18

## 2025-04-15 RX ORDER — POLYETHYLENE GLYCOL 3350 17 G/17G
17 POWDER, FOR SOLUTION ORAL
Refills: 0 | Status: DISCONTINUED | OUTPATIENT
Start: 2025-04-15 | End: 2025-04-18

## 2025-04-15 RX ORDER — SENNA 187 MG
2 TABLET ORAL AT BEDTIME
Refills: 0 | Status: DISCONTINUED | OUTPATIENT
Start: 2025-04-15 | End: 2025-04-18

## 2025-04-15 RX ORDER — DEXTROSE 50 % IN WATER 50 %
50 SYRINGE (ML) INTRAVENOUS ONCE
Refills: 0 | Status: COMPLETED | OUTPATIENT
Start: 2025-04-15 | End: 2025-04-15

## 2025-04-15 RX ORDER — ATORVASTATIN CALCIUM 80 MG/1
10 TABLET, FILM COATED ORAL AT BEDTIME
Refills: 0 | Status: DISCONTINUED | OUTPATIENT
Start: 2025-04-15 | End: 2025-04-16

## 2025-04-15 RX ORDER — METOPROLOL SUCCINATE 50 MG/1
12.5 TABLET, EXTENDED RELEASE ORAL EVERY 12 HOURS
Refills: 0 | Status: DISCONTINUED | OUTPATIENT
Start: 2025-04-15 | End: 2025-04-15

## 2025-04-15 RX ORDER — HYDROCORTISONE 25 MG/G
2.5 CREAM TOPICAL
Qty: 1 | Refills: 5 | Status: ACTIVE | COMMUNITY
Start: 2025-04-15 | End: 1900-01-01

## 2025-04-15 RX ORDER — METOPROLOL SUCCINATE 50 MG/1
12.5 TABLET, EXTENDED RELEASE ORAL EVERY 12 HOURS
Refills: 0 | Status: DISCONTINUED | OUTPATIENT
Start: 2025-04-15 | End: 2025-04-18

## 2025-04-15 RX ORDER — SODIUM ZIRCONIUM CYCLOSILICATE 5 G/5G
10 POWDER, FOR SUSPENSION ORAL ONCE
Refills: 0 | Status: COMPLETED | OUTPATIENT
Start: 2025-04-15 | End: 2025-04-15

## 2025-04-15 RX ORDER — ACETAMINOPHEN 500 MG/5ML
650 LIQUID (ML) ORAL EVERY 6 HOURS
Refills: 0 | Status: DISCONTINUED | OUTPATIENT
Start: 2025-04-15 | End: 2025-04-18

## 2025-04-15 RX ORDER — ASPIRIN 325 MG
81 TABLET ORAL DAILY
Refills: 0 | Status: DISCONTINUED | OUTPATIENT
Start: 2025-04-15 | End: 2025-04-15

## 2025-04-15 RX ADMIN — SODIUM ZIRCONIUM CYCLOSILICATE 10 GRAM(S): 5 POWDER, FOR SUSPENSION ORAL at 17:07

## 2025-04-15 RX ADMIN — APIXABAN 5 MILLIGRAM(S): 2.5 TABLET, FILM COATED ORAL at 21:59

## 2025-04-15 RX ADMIN — Medication 10 MILLIGRAM(S): at 17:07

## 2025-04-15 RX ADMIN — Medication 50 MILLILITER(S): at 17:07

## 2025-04-15 RX ADMIN — METOPROLOL SUCCINATE 12.5 MILLIGRAM(S): 50 TABLET, EXTENDED RELEASE ORAL at 21:59

## 2025-04-15 RX ADMIN — ATORVASTATIN CALCIUM 10 MILLIGRAM(S): 80 TABLET, FILM COATED ORAL at 22:00

## 2025-04-15 RX ADMIN — Medication 1 APPLICATION(S): at 22:18

## 2025-04-15 RX ADMIN — Medication 5 UNIT(S): at 17:07

## 2025-04-15 RX ADMIN — Medication 2 TABLET(S): at 21:59

## 2025-04-15 NOTE — CONSULT NOTE ADULT - ASSESSMENT
59M with multiple vascular risk factors including a fib on eliquis, last dose 10:30AM 4/15 and CAD s/p stents on ASA 81 mg p/w right monocular visual field deficit. Vessels with severe athero at carotid bifurcations and intracranially as well. CTH neg. CTP with motion.     Impression: Right monocular visual disturbance. R/o BRAO vs primary optho etiology vs stroke.     Recommendations:   [] Optho consult   []  C/w eliquis 5 mg bid for secondary stroke prevention  [] Aspirin per cardiology   []  Atorvastatin 80, titrate to LDL<70  []  MRI brain w/o contrast   [] TTE without bubble study and telemetry   []  DVT prophylaxis   []  Telemonitoring;  Neurochecks q4  []  Permissive HTN up to 220/120 mmHg for first 24 hours after the symptom onset followed by gradual normotension.   []  Please send HbA1C and Lipid Panel  []  PT/OT evaluation  []  NPO until clears Dysphagia screen, otherwise Swallow evaluation  []  Stroke education provided     D/w stroke attending Dr. Trisha Robledo  59M with multiple vascular risk factors including a fib on eliquis, last dose 10:30AM 4/15 and CAD s/p stents on ASA 81 mg p/w right monocular visual field deficit. Vessels with severe athero at carotid bifurcations and intracranially as well. CTH neg. CTP with motion.     Impression: Right monocular visual disturbance 2/2 Right CRAO. Mechanism ESUS.     Recommendations:   [] Optho following  [] Neuro IR consulted   []  C/w eliquis 5 mg bid for secondary stroke prevention  [] Aspirin per cardiology   []  Atorvastatin 80, titrate to LDL<70  []  MRI brain w/o contrast   [] TTE without bubble study and telemetry   []  DVT prophylaxis   []  Telemonitoring;  Neurochecks q4  []  Permissive HTN up to 220/120 mmHg for first 24 hours after the symptom onset followed by gradual normotension.   []  Please send HbA1C and Lipid Panel  []  PT/OT evaluation  []  NPO until clears Dysphagia screen, otherwise Swallow evaluation  []  Stroke education provided     D/w stroke attending and stroke fellow

## 2025-04-15 NOTE — ED ADULT TRIAGE NOTE - MODE OF ARRIVAL
VASCULAR SURGERY SERVICE    CHIEF COMPLAINT:  Functional left AV graft dysfunctional left AV graft    HISTORY OF PRESENT ILLNESS: Eva Bloom is a 58 y.o. female end-stage renal disease, with multiple serious medical comorbidities including admission for sepsis 2 months ago, EF 20%, AFib on Eliquis, who is having increasing bleeding after her dialysis runs.  I placed a revision, left AV graft with long interposition Accu Seal 12/28/2022.  She has had uninterrupted use of this graft ever since.    Recent weeks she is had increased bleeding after dialysis sticks    S/p:    1aaa.  Excision, left AV graft for infection 9/27/2023 ([facility]) (07/21/2023, Dr. Werner)  1aa. Reclosure, L AVG conter-incision 5/1/2023  1a. PTA, L AVG 4/19/2023  Excision, excluded L AVG and permacath 1/25/2023  revision, left AV graft with long interposition Accu Seal 12/28/2022  Original left AV graft 2017 with subsequent covered stent placement in venous outflow    09/1/2023:  She now returns after excision of the left AV graft for infection proximally 5 weeks ago.  She is been dialyzing through a PermCath.    09/22/2023:  Now returns with breakdown of prior counter incision for graft excision.  Notably, she has metastatic renal cell carcinoma to the lungs    10/20/2023:  She now returns after excision of the left AV graft remnant.  She is continued to deteriorate with weight loss, now BMI 13, was recently hospitalized for her declining state, has now been put on hospice.  This point she is still dialyzing through a PermCath    2/2024:  referred by Dr. Farooq for L 3rd toe wound.  Pt has resumed Oncologic care after stopping hospice care and desires to proceed with ulcer healing treatment.    4/2024:  +wound care with healing wounds.    Past Medical History:   Diagnosis Date    Anemia     Anticoagulant long-term use     Atrial fibrillation     Bronchitis 03/01/2017    Cancer 2016    kidney cancer    CHF (congestive heart failure),  NYHA class II, chronic, systolic     CMV (cytomegalovirus) antibody positive     CVA (cerebral vascular accident) 1/3/2024    Encounter for blood transfusion     ESRD (end stage renal disease)     Essential hypertension 09/23/2015    H/O herpes simplex type 2 infection     Herpes simplex type 1 antibody positive     History of kidney cancer     s/p left nephrectomy 1/2016    Hyperparathyroidism, unspecified     Hyperuricemia without signs of inflammatory arthritis and tophaceous disease     Kidney stones     LGSIL (low grade squamous intraepithelial dysplasia)     Myocardiopathy 07/21/2017    Prediabetes     Proteinuria     Renal disorder     Thyroid nodule     Urate nephropathy        Past Surgical History:   Procedure Laterality Date    BREAST CYST EXCISION      COLONOSCOPY N/A 11/12/2015    COLONOSCOPY N/A 03/12/2021    Procedure: COLONOSCOPY;  Surgeon: Brendon Lanier MD;  Location: [facility] ENDO;  Service: Endoscopy;  Laterality: N/A;  covid test 3/9, labs prior, prep instr mailed -ml    EXCISION OF ARTERIOVENOUS FISTULA Left 09/27/2023    Procedure: EXCISION, AV FISTULA;  Surgeon: FARIDEH Muñoz III, MD;  Location: [facility] OR [address] [place] [address];  Service: Vascular;  Laterality: Left;  LUE AV graft excision    INSERTION OF BIVENTRICULAR IMPLANTABLE CARDIOVERTER-DEFIBRILLATOR (ICD)  04/2021    INSERTION OF TUNNELED CENTRAL VENOUS CATHETER (CVC) WITH SUBCUTANEOUS PORT N/A 01/25/2023    Procedure: INSERTION, DUAL LUMEN CATHETER WITH PORT, WITH IMAGING GUIDANCE;  Surgeon: FARDIEH Muñoz III, MD;  [facility];  Service: Peripheral Vascular;  Laterality: N/A;  possinble permcath placment    NEPHRECTOMY-LAPAROSCOPIC Left 01/12/2016    PERCUTANEOUS TRANSLUMINAL ANGIOPLASTY OF ARTERIOVENOUS FISTULA Left 04/19/2023    Procedure: PTA, AV FISTULA;  Surgeon: FARIDEH Muñoz III, MD;  Location: [facility] CATH LAB;  Service: Peripheral Vascular;  Laterality: Left;    PERITONEAL CATHETER INSERTION      Permacath insertion   01/12/2017    PLACEMENT OF ARTERIOVENOUS GRAFT  12/28/2022    Procedure: INSERTION, GRAFT, ARTERIOVENOUS;  Surgeon: FARIDEH Muñoz III, MD;  Location: [facility] OR 2ND FLR;  Service: Peripheral Vascular;;    REMOVAL, GRAFT, ARTERIOVENOUS, UPPER EXTREMITY Left 01/25/2023    Procedure: REMOVAL, GRAFT, ARTERIOVENOUS, UPPER EXTREMITY;  Surgeon: FARIDEH Muñoz III, MD;  Location: [facility] OR 2ND FLR;  Service: Peripheral Vascular;  Laterality: Left;    REVISION OF ARTERIOVENOUS FISTULA Left 05/01/2023    Procedure: REVISION, AV FISTULA;  Surgeon: FARIDEH Muñoz III, MD;  Location: [facility] OR 2ND FLR;  Service: Peripheral Vascular;  Laterality: Left;  LUE AVG revision    REVISION OF PROCEDURE INVOLVING ARTERIOVENOUS GRAFT Left 12/28/2022    Procedure: REVISION, PROCEDURE INVOLVING ARTERIOVENOUS GRAFT;  Surgeon: FARIDEH Muñoz III, MD;  Location: [facility] OR 2ND FLR;  Service: Peripheral Vascular;  Laterality: Left;    REVISION OF PROCEDURE INVOLVING ARTERIOVENOUS GRAFT Left 07/21/2023    Procedure: LEFT ARM ARTERIOVENOUS GRAFT EXCISION  AND LIGATION;  Surgeon: Obi Werner MD;  Location: [facility] OR;  Service: Vascular;  Laterality: Left;  Left AVG excision and revision with interposition    SALPINGOOPHORECTOMY Right 2016    KJB---DAVINCI    TONSILLECTOMY      TUBAL LIGATION           Current Outpatient Medications:     acetaminophen (TYLENOL) 500 MG tablet, Take 500 mg by mouth every 6 (six) hours as needed for Pain., Disp: , Rfl:     acetaminophen (TYLENOL) 500 MG tablet, Take 1 tablet (500 mg total) by mouth every 4 (four) hours as needed for Pain or Temperature greater than (100.5 or greater)., Disp: 30 tablet, Rfl: 0    albuterol-ipratropium (DUO-NEB) 2.5 mg-0.5 mg/3 mL nebulizer solution, Take by nebulization., Disp: , Rfl:     apixaban (ELIQUIS) 2.5 mg Tab, Take 1 tablet (2.5 mg total) by mouth 2 (two) times daily., Disp: 60 tablet, Rfl: 11    aspirin (ECOTRIN) 81 MG EC tablet, Take 1 tablet (81 mg total) by mouth  Ambulance once daily., Disp: 90 tablet, Rfl: 3    atorvastatin (LIPITOR) 40 MG tablet, Take 1 tablet (40 mg total) by mouth once daily., Disp: 90 tablet, Rfl: 3    bisacodyL (DULCOLAX) 10 mg Supp, Place 10 mg rectally., Disp: , Rfl:     epoetin david-epbx (RETACRIT INJ), Epoetin david - epbx (Retacrit), Disp: , Rfl:     hyoscyamine (LEVSIN/SL) 0.125 mg Subl, Place under the tongue., Disp: , Rfl:     lanthanum (FOSRENOL) 1000 MG chewable tablet, Take 1 tablet by mouth., Disp: , Rfl:     levothyroxine (SYNTHROID) 75 MCG tablet, TAKE 1 TABLET BY MOUTH EVERY DAY, Disp: 90 tablet, Rfl: 3    lidocaine-prilocaine (EMLA) cream, APPLY ATLEAST 30 MINUTES BEFORE TREATMENT 3 TIMES A WEEK, Disp: 30 g, Rfl: 11    LORAZEPAM INTENSOL 2 mg/mL Conc, Take by mouth., Disp: , Rfl:     metoprolol succinate (TOPROL-XL) 25 MG 24 hr tablet, Take 1 tablet (25 mg total) by mouth once daily., Disp: 30 tablet, Rfl: 11    mirtazapine (REMERON) 7.5 MG Tab, Take 1 tablet (7.5 mg total) by mouth every evening., Disp: 30 tablet, Rfl: 11    mv,Ca,min-folic acid-vit K1 (ONE-A-DAY WOMEN'S 50 PLUS) 400-20 mcg Tab, Take 1 tablet by mouth once daily., Disp: , Rfl:     naloxone (NARCAN) 1 mg/mL injection, 2 mg (1 mg per nostril) by Nasal route as needed for opioid overdose; may repeat in 3 to 5 minutes if not effective. Call 911, Disp: 2 mL, Rfl: 11    nystatin (MYCOSTATIN) cream, SMARTSIG:sparingly Topical 2-4 Times Daily PRN, Disp: , Rfl:     ondansetron (ZOFRAN-ODT) 4 MG TbDL, Take by mouth., Disp: , Rfl:     oxyCODONE (ROXICODONE) 5 MG immediate release tablet, Take 1 tablet (5 mg total) by mouth every 6 (six) hours as needed for Pain., Disp: 11 tablet, Rfl: 0    pantoprazole (PROTONIX) 40 MG tablet, Take 1 tablet (40 mg total) by mouth 2 (two) times daily., Disp: 60 tablet, Rfl: 11    traMADoL (ULTRAM) 50 mg tablet, Take 1 tablet (50 mg total) by mouth every 12 (twelve) hours as needed for Pain., Disp: 30 each, Rfl: 1    cabozantinib (CABOMETYX) 60 mg Tab, TAKE  "ONE TABLET BY MOUTH ONCE DAILY AT THE SAME TIME ON AN EMPTY STOMACH AT LEAST 1 HOUR BEFORE OR 2 HOURS AFTER EATING. AVOID GRAPEFRUIT PRODUCTS (Patient not taking: Reported on 4/29/2024), Disp: 30 tablet, Rfl: 4  No current facility-administered medications for this visit.    Facility-Administered Medications Ordered in Other Visits:     0.9%  NaCl infusion, , Intravenous, Continuous, Soni Bhatti MD, New Bag at 07/21/23 1116    Review of patient's allergies indicates:   Allergen Reactions    Carvedilol Other (See Comments)     Nausea/vomiting    Allopurinol      Other reaction(s): abnormal transaminases       Family History   Problem Relation Name Age of Onset    Hypertension Mother      Diabetes Father      Kidney disease Father      No Known Problems Sister Sophy     Heart disease Sister Maria A     No Known Problems Sister Claudine     No Known Problems Brother Dax     No Known Problems Brother Don     Cataracts Maternal Aunt      No Known Problems Maternal Uncle      No Known Problems Paternal Aunt      No Known Problems Paternal Uncle      No Known Problems Maternal Grandmother      Diabetes Maternal Grandfather      No Known Problems Paternal Grandmother      No Known Problems Paternal Grandfather      No Known Problems Son      No Known Problems Son      No Known Problems Other      Breast cancer Neg Hx      Colon cancer Neg Hx      Cancer Neg Hx      Stroke Neg Hx      Amblyopia Neg Hx      Blindness Neg Hx      Glaucoma Neg Hx      Macular degeneration Neg Hx      Retinal detachment Neg Hx      Strabismus Neg Hx      Thyroid disease Neg Hx         Social History     Tobacco Use    Smoking status: Never     Passive exposure: Never    Smokeless tobacco: Never   Substance Use Topics    Alcohol use: No    Drug use: Yes     Types: Hydrocodone       PHYSICAL EXAM:   /83 (BP Location: Right arm, Patient Position: Sitting)   Pulse 82   Ht 5' 4" (1.626 m)   Wt 44.5 kg (98 lb)   LMP 10/24/2016   BMI 16.82 " kg/m²   Constitutional:  She appears very frail and deconditioned   Neurological: Normal speech  no focal findings  CN II - XII grossly intact.    Psychiatric: Mood and affect appropriate and symmetric.   HEENT: Normocephalic / atraumatic  PERRLA  Midline trachea  No scars across the neck   Cardiac: Regular rate and rhythm.   Pulmonary: Normal pulmonary effort.   Abdomen: Soft, not distended.     Skin: Warm and well perfused.    Vascular:  Radial pulses are nonpalpable bilaterally.   Extremities/  Musculoskeletal: No edema.   Right arm inc well healed, L 2-3 toes with edema and dry ischemic changes       IMAGIN/2024  Right lower extremity pressures and waveforms indicate moderate to severe arterial occlusive disease.  Left lower extremity pressures and waveforms indicate moderate to severe arterial occlusive disease.  Ankle pressures are non-compressible indicating possible medial calcinosis.     Left lower extremity arterial ultrasound shows decreased external iliac artery flow indicating proximal stenosis with decreased distal femoropopliteal artery flow, heavily calcified arteries, an occluded PT and peroneal arteries.     IMPRESSION:   Status post excision, left AV graft remnant.    Patient's medical status continued to deteriorate and now off home hospice  AFib on Eliquis  Severe systolic dysfunction EF 20%  Metastatic renal cell to lung    PLAN  -NUVIA / TP show moderate to severe arterial occlusive disease  -LLE arterial US to eval flow s/p L AT/PT PTA 2023  -Wound care recs  -F/u 2-3 mo after Onc prognosis / plan appt     I spent 11 minutes evaluating this patient and greater than 50% of the time was spent counseling, coordinator care and discussing the plan of care.  All questions were answered and patient stated understanding with agreement with the above treatment plan.    Corby Casey M.D., R.P.V.I. [facility]                             EMS

## 2025-04-15 NOTE — ED ADULT NURSE NOTE - NS ED NURSE TRANSPORT WITH
Cardiac Monitor/Defib/ACLS/Rescue Kit/O2/BVM/pulse ox/ACLS Rescue Kit ED Tech & MD Farooq/Cardiac Monitor/Defib/ACLS/Rescue Kit/O2/BVM/pulse ox/ACLS Rescue Kit

## 2025-04-15 NOTE — PROGRESS NOTE ADULT - SUBJECTIVE AND OBJECTIVE BOX
HOSPITAL COURSE:  59y man with a PMHx significant for atrial fibrillation on eliquis 5 mg BID (last dose 4/15 at 10:30 am), HTN, CABG 2014, ESRD on T/Th/S dialysis 2/2 PKD, CAD s/p stents on ASA 81 mg presenting with acute onset right eye blurry vision after taking shower. LKW 1:45 PM 4/15. Denies HA, weakness, numbness, vision loss. Denies prior hx of stroke.     NIHSS:2  preMRS:3, walks with walker independently due to b/l hip replacements     Not a tenecteplase candidate given abnormal coags iso eliquis use  Not a thrombectomy candidate given no LVO     Admission Scores  NIHSS: 2     24 hour Events:     4/15: Tx to NSCU for close monitoring.     Allergies    No Known Allergies    Intolerances    REVIEW OF SYSTEMS: [ ] Unable to Assess due to neurologic exam   [X ] All ROS addressed below are non-contributory, except:  Neuro: [ ] Headache [ ] Back pain [ ] Numbness [ ] Weakness [ ] Ataxia [ ] Dizziness [ ] Aphasia [ ] Dysarthria [X ] Visual disturbance  Resp: [ ] Shortness of breath/dyspnea, [ ] Orthopnea [ ] Cough  CV: [ ] Chest pain [ ] Palpitation [ ] Lightheadedness [ ] Syncope  Renal: [ ] Thirst [ ] Edema  GI: [ ] Nausea [ ] Emesis [ ] Abdominal pain [ ] Constipation [ ] Diarrhea  Hem: [ ] Hematemesis [ ] bright red blood per rectum  ID: [ ] Fever [ ] Chills [ ] Dysuria  ENT: [ ] Rhinorrhea      DEVICES:   [ ] Restraints [ ] ET tube [ ] central line [ ] arterial line [ ] hayes [ ] NGT/OGT [ ] EVD [ ] LD [ ] DEVAUGHN/HMV [ ] Trach [ ] PEG [ ] Chest Tube     VITALS:   Vital Signs Last 24 Hrs  T(C): 37.1 (16 Apr 2025 00:22), Max: 37.1 (16 Apr 2025 00:22)  T(F): 98.7 (16 Apr 2025 00:22), Max: 98.7 (16 Apr 2025 00:22)  HR: 71 (16 Apr 2025 00:22) (71 - 98)  BP: 114/61 (16 Apr 2025 00:22) (114/61 - 159/81)  BP(mean): 81 (16 Apr 2025 00:22) (81 - 113)  RR: 18 (16 Apr 2025 00:22) (17 - 22)  SpO2: 100% (16 Apr 2025 00:22) (98% - 100%)    I&O's Summary    15 Apr 2025 07:01  -  16 Apr 2025 00:27  --------------------------------------------------------  IN: 0 mL / OUT: 1500 mL / NET: -1500 mL      LABS:                        9.4    3.57  )-----------( 112      ( 15 Apr 2025 15:58 )             29.0     04-15    136  |  99  |  51[H]  ----------------------------<  49[LL]  5.2   |  20[L]  |  7.60[H]             MEDICATION LEVELS:     IVF FLUIDS/MEDICATIONS:   MEDICATIONS  (STANDING):  apixaban 5 milliGRAM(s) Oral two times a day  aspirin  chewable 81 milliGRAM(s) Oral daily  atorvastatin 10 milliGRAM(s) Oral at bedtime  chlorhexidine 4% Liquid 1 Application(s) Topical <User Schedule>  metoprolol tartrate 12.5 milliGRAM(s) Oral every 12 hours  pantoprazole    Tablet 40 milliGRAM(s) Oral before breakfast  polyethylene glycol 3350 17 Gram(s) Oral two times a day  senna 2 Tablet(s) Oral at bedtime    MEDICATIONS  (PRN):  acetaminophen     Tablet .. 650 milliGRAM(s) Oral every 6 hours PRN Temp greater or equal to 38C (100.4F), Mild Pain (1 - 3)        IMAGING:  < from: CT Angio Brain Stroke Protocol  w/ IV Cont (04.15.25 @ 16:10) >  IMPRESSION: Small vessel white matter ischemic changes. No hemorrhage.   Heterogeneous dense calvarium and cervical spine may be related to renal   osteodystrophy. CTA of the head and neck demonstrates calcification of   the carotid bifurcations bilaterally without significant stenosis as well   as distal V4 segment calcifications. CT perfusion demonstrates a small   amount of tissue with delayed mean transit time which may be artifactual   due to motion.      EXAMINATION:  PHYSICAL EXAM:    Constitutional: No Acute Distress     Neurological: AOx3, EOMI, PERRL, FC, R nasal visual field cut, RUE 5/5 except triceps 4/5, LUE 5/5, RLE 3/5, LLE 3/5 w/ assistance (baseline per pt). Ambulates w/ walker and wheelchair.     Pulmonary: Clear to Auscultation, No rales, No rhonchi, No wheezes     Cardiovascular: S1, S2, Regular rate and rhythm     Gastrointestinal: Soft, Non-tender, Non-distended     Extremities: No calf tenderness

## 2025-04-15 NOTE — ED PROVIDER NOTE - CLINICAL SUMMARY MEDICAL DECISION MAKING FREE TEXT BOX
This is a 59-year-old male, history significant for A-fib, on Eliquis, HTN, CABG in 2014, ESRD, on dialysis Tuesday Thursday Saturday, presenting with acute onset of right high blurry vision.  The patient states that he is getting ready to go to dialysis when this suddenly happened.  The patient is denying any ocular pain.  Patient denies any other symptoms of numbness or weakness.  The patient states that he has never had the symptoms before.  The patient's last known normal was at 1:45 PM.   given patient's symptoms a code stroke was called.  The patient denies any other symptoms.  ROS is otherwise negative.    VS.  On arrival patient's vitals within normal limits  PE.  Left-sided visual field defect with right eye.  Pupils equal to light.  Patient with no sensory or motor deficits.    Differential includes but is not limited to CRAO, CRVO,  CVA, Amaurosis fugax, retinal detachment. Neurology and ophthalmology consulted, Will obtain basic labs, CT imaging. Final dispo pending labs, imaging and reassessment. Pt likely to be admitted. This is a 59-year-old male, history significant for A-fib, on Eliquis, HTN, CABG in 2014, ESRD, on dialysis Tuesday Thursday Saturday, presenting with acute onset of right high blurry vision.  The patient states that he is getting ready to go to dialysis when this suddenly happened.  The patient is denying any ocular pain.  Patient denies any other symptoms of numbness or weakness.  The patient states that he has never had the symptoms before.  The patient's last known normal was at 1:45 PM.   given patient's symptoms a code stroke was called.  The patient denies any other symptoms.  ROS is otherwise negative.    VS.  On arrival patient's vitals within normal limits  PE.  Left-sided visual field defect with right eye.  Pupils equal to light.  Patient with no sensory or motor deficits.    Differential includes but is not limited to CRAO, CRVO,  CVA, Amaurosis fugax, retinal detachment. Neurology and ophthalmology consulted, Will obtain basic labs, CT imaging. Final dispo pending labs, imaging and reassessment. Pt likely to be admitted.    Dr. Braswell (Attending Physician)

## 2025-04-15 NOTE — ED ADULT NURSE NOTE - OBJECTIVE STATEMENT
58 y/o male with PMH HTN , ESRD last dialysis saturday, CAD, BIBEMS presenting to ED for right eye blurry vision after taking a shower today at 1345, arrived at his dialysis appointment and brought it to the attention of staff who called EMS. Upon exam pt is AOx4 breathing even unlabored spontaneously, see stroke flow sheet for neuro exam, walks with walker but is currently in wheel chair d/t b/l hip replacements . pt denies chest pain, sob, ha, n/v/d, abdominal pain, f/c, urinary symptoms, hematuria.

## 2025-04-15 NOTE — CONSULT NOTE ADULT - SUBJECTIVE AND OBJECTIVE BOX
Patient is a 59y old  Male who presents with a chief complaint of CRAO (15 Apr 2025 17:05)      HPI:  EDGAR CLAUDIO is a 59y (1965) man with a PMHx significant for atrial fibrillation on eliquis 5 mg bid, HTN, CABG 2014, ESRD on T/Th/S dialysis 2/2 PKD, CAD s/p stents on ASA 81 mg presenting with acute onset right eye blurry vision after taking shower. LKW 1:45 PM 4/15. Denies HA, weakness, numbness, vision loss. Denies prior hx of stroke. Nephrology consulted to arrange dialysis. Patient gets HD at [facility] under the care of Dr. Javy Taylor. LAst HD was Saturday     Last eliquis dose 10:30 AM 4/15.     NIHSS:2  preMRS:3, walks with walker independently due to b/l hip replacements     Not a tenecteplase candidate given abnormal coags iso eliquis use  Not a thrombectomy candidate given no LVO  (15 Apr 2025 17:05)      PAST MEDICAL & SURGICAL HISTORY:  HTN (hypertension)      End stage renal disease on dialysis      Coronary artery disease      Polycystic kidney      Polycystic liver disease      Hepatitis C      AV fistula      Stented coronary artery      Coronary artery disease involving other coronary artery bypass graft          MEDICATIONS  (STANDING):      Allergies    No Known Allergies    Intolerances        SOCIAL HISTORY:  Denies ETOh,Smoking,     FAMILY HISTORY:  No pertinent family history in first degree relatives        REVIEW OF SYSTEMS:  CONSTITUTIONAL: No weakness, fevers or chills  EYES/ENT: No visual changes;  No vertigo or throat pain   NECK: No pain or stiffness  RESPIRATORY: No cough, wheezing, hemoptysis; No shortness of breath  CARDIOVASCULAR: No chest pain or palpitations  GASTROINTESTINAL: No abdominal or epigastric pain. No nausea, vomiting, or hematemesis; No diarrhea or constipation. No melena or hematochezia.  GENITOURINARY: No dysuria, frequency or hematuria  NEUROLOGICAL: No numbness or weakness  SKIN: No itching, burning, rashes, or lesions   All other review of systems is negative unless indicated above.    VITAL:  T(C): , Max: 36.7 (04-15-25 @ 15:46)  T(F): , Max: 98 (04-15-25 @ 15:46)  HR: 87 (04-15-25 @ 15:46)  BP: 138/81 (04-15-25 @ 15:46)  BP(mean): --  RR: 20 (04-15-25 @ 15:46)  SpO2: 100% (04-15-25 @ 15:46)  Wt(kg): --    PHYSICAL EXAM:  Constitutional: NAD, Alert  HEENT: NCAT, MMM  Neck: Supple, No JVD  Respiratory: CTA-b/l  Cardiovascular: RRR s1s2, no m/r/g  Gastrointestinal: BS+, soft, NT/ND  Extremities: No peripheral edema b/l  Neurological: no focal deficits; strength grossly intact  Back: no CVAT b/l  Skin: No rashes, no nevi    LABS:                        9.4    3.57  )-----------( 112      ( 15 Apr 2025 15:58 )             29.0     Na(136)/K(6.9)/Cl(100)/HCO3(21)/BUN(52)/Cr(7.53)Glu(109)/Ca(8.5)/Mg(--)/PO4(--)    04-15 @ 15:58    Urinalysis Basic - ( 15 Apr 2025 15:58 )    Color: x / Appearance: x / SG: x / pH: x  Gluc: 109 mg/dL / Ketone: x  / Bili: x / Urobili: x   Blood: x / Protein: x / Nitrite: x   Leuk Esterase: x / RBC: x / WBC x   Sq Epi: x / Non Sq Epi: x / Bacteria: x            IMAGING:    ASSESSMENT:  EDGAR CLAUDIO is a 59y (1965) man with a PMHx significant for atrial fibrillation on eliquis 5 mg bid, HTN, CABG 2014, ESRD on T/Th/S dialysis 2/2 PKD, CAD s/p stents on ASA 81 mg presenting with acute onset right eye blurry visio    ESRD On HD  Hyperkalemia - needs HD today   BP     RECOMMEND:  Plan for HD today   Hyperkalemia protocol in the interim   Dose meds for intermittent HD       D/w ER attending   Thank you for involving [facility] in this patient's care.    With warm regards,    Haley Nguyễn MD   [facility]  Office: [phone]

## 2025-04-15 NOTE — CONSULT NOTE ADULT - SUBJECTIVE AND OBJECTIVE BOX
Neurology - Consult Note    -  Spectra: [phone] ([facility]), [phone] ([facility])  -    HPI: Patient EDGAR CLAUDIO is a 59y (1965) man with a PMHx significant for atrial fibrillation on eliquis 5 mg bid, HTN, CABG 2014, ESRD on T/Th/S dialysis 2/2 PKD, CAD s/p stents on ASA 81 mg presenting with acute onset right eye blurry vision after taking shower. LKW 1:45 PM 4/15. Denies HA, weakness, numbness, vision loss. Denies prior hx of stroke.     Last eliquis dose 10:30 AM 4/15.     NIHSS:2  preMRS:3, walks with walker independently due to b/l hip replacements     Not a tenecteplase candidate given abnormal coags iso eliquis use  Not a thrombectomy candidate given no LVO       Review of Systems:   All other review of systems is negative unless indicated above.    Allergies:  No Known Allergies      PMHx/PSHx/Family Hx: As above, otherwise see below   HTN (hypertension)    End stage renal disease on dialysis    Coronary artery disease    Polycystic kidney    Polycystic liver disease    Hepatitis C        Social Hx:  No current use of tobacco, alcohol, or illicit drugs      Medications:  MEDICATIONS  (STANDING):    MEDICATIONS  (PRN):      Vitals:  T(C): 36.7 (04-15-25 @ 15:46), Max: 36.7 (04-15-25 @ 15:46)  HR: 87 (04-15-25 @ 15:46) (87 - 87)  BP: 138/81 (04-15-25 @ 15:46) (138/81 - 138/81)  RR: 20 (04-15-25 @ 15:46) (20 - 20)  SpO2: 100% (04-15-25 @ 15:46) (100% - 100%)    Neurologic Exam:  Mental status - Awake, Alert, Oriented to person, place, and time. Speech fluent, repetition and naming intact. Follows simple and complex commands. Attention/concentration, recent and remote memory (including registration and recall), and fund of knowledge intact    Cranial nerves - PERRLA, right nasal visual field cut, EOMI, face sensation (V1-V3) intact b/l, facial strength intact without asymmetry b/l, hearing intact b/l, palate with symmetric elevation, trapezius  5/5 strength b/l, tongue midline on protrusion with full lateral movement    Motor - Normal bulk and tone throughout. No pronator drift.  Strength testing            Deltoid      Biceps      Triceps     Wrist Extension    Wrist Flexion     Interossei         R            5                 5               5                     5                              5                        5                 5  L             5                 5               5                     5                              5                        5                 5              Hip Flexion    Hip Extension    Knee Flexion    Knee Extension    Dorsiflexion    Plantar Flexion  R              5                           5                       5                           5                            5                          5  L              5                           5                        5                           5                            5                          5    Sensation - Light touch intact throughout    DTR's -  did not assess given focused neuro exam     Coordination - Finger to Nose intact b/l. No tremors appreciated    Gait and station - sawyer given c/f safety     Labs:                        9.4    3.57  )-----------( 112      ( 15 Apr 2025 15:58 )             29.0     04-15    136  |  100  |  52[H]  ----------------------------<  109[H]  6.9[HH]   |  21[L]  |  7.53[H]    Ca    8.5      15 Apr 2025 15:58    TPro  7.3  /  Alb  4.0  /  TBili  0.6  /  DBili  x   /  AST  9[L]  /  ALT  7[L]  /  AlkPhos  202[H]  04-15    CAPILLARY BLOOD GLUCOSE      POCT Blood Glucose.: 108 mg/dL (15 Apr 2025 15:53)    LIVER FUNCTIONS - ( 15 Apr 2025 15:58 )  Alb: 4.0 g/dL / Pro: 7.3 g/dL / ALK PHOS: 202 U/L / ALT: 7 U/L / AST: 9 U/L / GGT: x             PT/INR - ( 15 Apr 2025 15:58 )   PT: 17.2 sec;   INR: 1.50 ratio         PTT - ( 15 Apr 2025 15:58 )  PTT:41.7 sec  CSF:                  Radiology:  < from: CT Angio Neck Stroke Protocol w/ IV Cont (04.15.25 @ 16:11) >  IMPRESSION: Small vessel white matter ischemic changes. No hemorrhage.   Heterogeneous dense calvarium and cervical spine may be related to renal   osteodystrophy. CTA of the head and neck demonstrates calcification of   the carotid bifurcations bilaterally without significant stenosis as well   as distal V4 segment calcifications. CT perfusion demonstrates a small   amount of tissue with delayed mean transit time which may be artifactual   due to motion.    < end of copied text >

## 2025-04-15 NOTE — H&P ADULT - NSHPLABSRESULTS_GEN_ALL_CORE
< from: CT Angio Neck Stroke Protocol w/ IV Cont (04.15.25 @ 16:11) >  IMPRESSION: Small vessel white matter ischemic changes. No hemorrhage.   Heterogeneous dense calvarium and cervical spine may be related to renal   osteodystrophy. CTA of the head and neck demonstrates calcification of   the carotid bifurcations bilaterally without significant stenosis as well   as distal V4 segment calcifications. CT perfusion demonstrates a small   amount of tissue with delayed mean transit time which may be artifactual   due to motion.    < end of copied text >

## 2025-04-15 NOTE — CONSULT NOTE ADULT - ASSESSMENT
Assessment and Recommendations:  59y male with PMHx atrial fibrillation (on AC), HTN, CAD s/p stents, CABG, ESRD on HD presenting to ED for acute onset blurred vision in the right eye, found to have early CRAO OD.     #CRAO, right eye   -Patient presenting to ED with acute onset blurred vision starting at 1:45 PM today  -No HA, scalp tenderness, jaw claudication, fevers, or diplopia   -VA 20/400 OD with + APD. CVF with superonasal constriction OD, full OS. Color 6/12 OD, 12/12 OS.   -On fundus exam, found to have retinal whitening involving the superior portion of the fovea with early cherry red spot OD. OS wnl.  -Given constellation of findings including 20/400 vision, APD OD, and retinal whitening involving the fovea, findings consistent with CRAO OD  -Recommend ESR and CRP. Please notify on call ophthalmologist of results.   -Recommend full stroke work-up including MRI brain & orbits w/ and w/o contrast   -Interventional neurology contacted, pending final plan regarding intra-arterial tPA    -Ophtho to follow     Patient discussed with Dr. Owens, consult attending and Dr. Kraft, retina attending     Outpatient follow-up: Patient should follow-up with his/her ophthalmologist or with [facility] Department of Ophthalmology at the address below     [address] [place] [address]. [address]  [place] [address]  [phone]     Assessment and Recommendations:  59y male with PMHx atrial fibrillation (on AC), HTN, CAD s/p stents, CABG, ESRD on HD presenting to ED for acute onset blurred vision in the right eye, found to have early ciloretinal artery occlusion OD.     #Ciloretinal artery Occlusion OD  -Patient presenting to ED with acute onset blurred vision starting at 1:45 PM today  -No HA, scalp tenderness, jaw claudication, fevers, or diplopia   -VA 20/400 OD with + APD OD. CVF with superonasal constriction OD, full OS. Color 6/12 OD, 12/12 OS.   -On fundus exam, found to have retinal whitening involving the superior portion of the fovea OD. OS wnl.  -Recommend ESR and CRP. Please notify on call ophthalmologist of results.   -Recommend full stroke work-up including MRI brain & orbits w/ and w/o contrast   -Interventional neurology contacted, pending final plan regarding intra-arterial tPA    -Ophtho to follow     Patient discussed with Dr. Owens, consult attending and Dr. Kraft, retina attending     Outpatient follow-up: Patient should follow-up with his/her ophthalmologist or with [facility] Department of Ophthalmology at the address below     [address] [place] [address]. [address]  [place] [address]  [phone]     Assessment and Recommendations:  59y male with PMHx atrial fibrillation (on AC), HTN, CAD s/p stents, CABG, ESRD on HD presenting to ED for acute onset blurred vision in the right eye, found to have ciloretinal artery occlusion OD and likely CRAO OD.     #Suspected CRAO OD  #Ciloretinal artery Occlusion OD  -Patient presenting to ED with acute onset blurred vision starting at 1:45 PM today  -No HA, scalp tenderness, jaw claudication, fevers, or diplopia   -VA 20/400 OD with + APD OD. CVF with superonasal constriction OD, full OS. Color 6/12 OD, 12/12 OS.   -On fundus exam, found to have retinal whitening involving the superior portion of the fovea OD. OS wnl.  -Examination findings consistent with cilioretinal artery occlusion OD. However, findings do not explain degree of APD identified on exam and degree of visual field loss. High suspicion for early CRAO.   -Recommend ESR and CRP. Please notify on call ophthalmologist of results.   -Recommend full stroke work-up including MRI brain & orbits w/ and w/o contrast   -Interventional neurology contacted, pending final plan regarding intra-arterial tPA    -Ophtho to follow     Patient discussed with Dr. Owens, consult attending and Dr. Kraft, retina attending     Outpatient follow-up: Patient should follow-up with his/her ophthalmologist or with [facility] Department of Ophthalmology at the address below     [address] [place] [address]. [address]  [place] [address]  [phone]

## 2025-04-15 NOTE — ED PROVIDER NOTE - PROGRESS NOTE DETAILS
Sara Hickman, PGY-3 DO:  Ophthalmology evaluated, concern for possible early CRAO. Pending final neuro recs. Patient also hyperkalemic, patient given insulin dextrose and lokelma. Patient TBA. Pending final ophthalmology recs.

## 2025-04-15 NOTE — H&P ADULT - ASSESSMENT
59M with multiple vascular risk factors including a fib on eliquis, last dose 10:30AM 4/15 and CAD s/p stents on ASA 81 mg p/w right monocular visual field deficit. Vessels with severe athero at carotid bifurcations and intracranially as well. CTH neg. CTP with motion.     Impression: Right monocular visual disturbance 2/2 Right CRAO. Mechanism ESUS.     Recommendations:   [] Optho following  [] Neuro IR consulted   []  C/w eliquis 5 mg bid for secondary stroke prevention  [] Aspirin per cardiology   []  Atorvastatin 80, titrate to LDL<70  []  MRI brain w/o contrast   [] TTE without bubble study and telemetry   []  DVT prophylaxis   []  Telemonitoring;  Neurochecks q4  []  Permissive HTN up to 220/120 mmHg for first 24 hours after the symptom onset followed by gradual normotension.   []  Please send HbA1C and Lipid Panel  []  PT/OT evaluation  []  NPO until clears Dysphagia screen, otherwise Swallow evaluation  []  Stroke education provided     D/w stroke attending and stroke fellow

## 2025-04-15 NOTE — PROGRESS NOTE ADULT - CRITICAL CARE ATTENDING COMMENT
I personally saw and examined Mr. Lee and agree with the findings documented above.   He is a gentleman with ESRD on HD and afib on Eliquis. He has a right superior field cut of his right eye. CTA head and neck revealed significant athero at the R ICA bifurcation. He is a high risk of clinical deterioration due to stroke. Plan is to continue AC and his ASA, have him complete his scheduled HD,  and closely monitor his exam.

## 2025-04-15 NOTE — CONSULT NOTE ADULT - SUBJECTIVE AND OBJECTIVE BOX
[facility] DEPARTMENT OF OPHTHALMOLOGY - INITIAL ADULT CONSULT  ----------------------------------------------------------------------------------------------------  wSati Hernandez MD, PGY-1  -------------------------------------------------------------------------------------------------    HPI:  EDGAR CLAUDIO is a 59y (1965) man with a PMHx significant for atrial fibrillation on eliquis 5 mg bid, HTN, CABG 2014, ESRD on T/Th/S dialysis 2/2 PKD, CAD s/p stents on ASA 81 mg presenting with acute onset right eye blurry vision after taking shower. LKW 1:45 PM 4/15. Denies HA, weakness, numbness, vision loss. Denies prior hx of stroke.     Last eliquis dose 10:30 AM 4/15.     NIHSS:2  preMRS:3, walks with walker independently due to b/l hip replacements     Not a tenecteplase candidate given abnormal coags iso eliquis use  Not a thrombectomy candidate given no LVO  (15 Apr 2025 17:05)      Interval History: Patient reports that at 1:45 today, he developed blurred vision of the right eye abruptly. Denies black out vision, headache, scalp tenderness, jaw claudication, and diplopia.       PAST MEDICAL & SURGICAL HISTORY:  HTN (hypertension)      End stage renal disease on dialysis      Coronary artery disease      Polycystic kidney      Polycystic liver disease      Hepatitis C      AV fistula      Stented coronary artery      Coronary artery disease involving other coronary artery bypass graft        Past Ocular History: no reported hx   Ophthalmic Medications: none  FAMILY HISTORY:  No pertinent family history in first degree relatives    MEDICATIONS  (STANDING):    MEDICATIONS  (PRN):    Allergies & Intolerances:   No Known Allergies    Review of Systems:  Constitutional: No fever, chills  Eyes: as per above   Neuro: No tremors  Cardiovascular: No chest pain, palpitations  Respiratory: No SOB, no cough  GI: No nausea, vomiting, abdominal pain  : No dysuria  Skin: no rash  Psych: no depression  Endocrine: no polyuria, polydipsia  Heme/lymph: no swelling    VITALS: T(C): 36.7 (04-15-25 @ 17:15)  T(F): 98.1 (04-15-25 @ 17:15), Max: 98.1 (04-15-25 @ 17:15)  HR: 91 (04-15-25 @ 17:15) (87 - 91)  BP: 159/81 (04-15-25 @ 17:15) (138/81 - 159/81)  RR:  (20 - 22)  SpO2:  (100% - 100%)  Wt(kg): --  General: AAO x 3, appropriate mood and affect    Ophthalmology Exam:  Visual acuity (sc): 20/400 OD, 20/20 OS  Pupils: PERRL OU, + APD OD  Ttono: 17 OD, 17 OS  Extraocular movements (EOMs): Full OU, no pain, no diplopia  Confrontational Visual Field (CVF): Superonasal constriction OD, full OS  Color Plates: 6/12 OD, 12/12 OS    Pen Light Exam (PLE)  External: Flat OU  Lids/Lashes/Lacrimal Ducts: Flat OU    Sclera/Conjunctiva: W+Q OU  Cornea: Cl OU  Anterior Chamber: D+F OU    Iris: Flat OU  Lens: NS/CC OD>OS    Fundus Exam: dilated with 1% tropicamide and 2.5% phenylephrine  Approval obtained from primary team for dilation  Patient aware that pupils can remained dilated for at least 4-6 hours  Exam performed with 20D lens    Vitreous: wnl OU  Disc, cup/disc: sharp and pink, 0.3 OU  Macula: retinal whitening predominantly superiorly with faint cherry red spot OD. OS wnl.   Vessels: Sclerotic appearing vessel emanating from disc inferiorly OD, otherwise wnl.   Periphery: wnl OU    Labs/Imaging:  < from: CT Angio Neck Stroke Protocol w/ IV Cont (04.15.25 @ 16:11) >    IMPRESSION: Small vessel white matter ischemic changes. No hemorrhage.   Heterogeneous dense calvarium and cervical spine may be related to renal   osteodystrophy. CTA of the head and neck demonstrates calcification of   the carotid bifurcations bilaterally without significant stenosis as well   as distal V4 segment calcifications. CT perfusion demonstrates a small   amount of tissue with delayed mean transit time which may be artifactual   due to motion.    < end of copied text >     [facility] DEPARTMENT OF OPHTHALMOLOGY - INITIAL ADULT CONSULT  ----------------------------------------------------------------------------------------------------  Swati Hernandez MD, PGY-3  -------------------------------------------------------------------------------------------------    HPI:  EDGAR CLAUDIO is a 59y (1965) man with a PMHx significant for atrial fibrillation on eliquis 5 mg bid, HTN, CABG 2014, ESRD on T/Th/S dialysis 2/2 PKD, CAD s/p stents on ASA 81 mg presenting with acute onset right eye blurry vision after taking shower. LKW 1:45 PM 4/15. Denies HA, weakness, numbness, vision loss. Denies prior hx of stroke.     Last eliquis dose 10:30 AM 4/15.     NIHSS:2  preMRS:3, walks with walker independently due to b/l hip replacements     Not a tenecteplase candidate given abnormal coags iso eliquis use  Not a thrombectomy candidate given no LVO  (15 Apr 2025 17:05)      Interval History: Patient reports that at 1:45 today, he developed blurred vision of the right eye abruptly. Denies black out vision, headache, scalp tenderness, jaw claudication, and diplopia.       PAST MEDICAL & SURGICAL HISTORY:  HTN (hypertension)      End stage renal disease on dialysis      Coronary artery disease      Polycystic kidney      Polycystic liver disease      Hepatitis C      AV fistula      Stented coronary artery      Coronary artery disease involving other coronary artery bypass graft        Past Ocular History: no reported hx   Ophthalmic Medications: none  FAMILY HISTORY:  No pertinent family history in first degree relatives    MEDICATIONS  (STANDING):    MEDICATIONS  (PRN):    Allergies & Intolerances:   No Known Allergies    Review of Systems:  Constitutional: No fever, chills  Eyes: as per above   Neuro: No tremors  Cardiovascular: No chest pain, palpitations  Respiratory: No SOB, no cough  GI: No nausea, vomiting, abdominal pain  : No dysuria  Skin: no rash  Psych: no depression  Endocrine: no polyuria, polydipsia  Heme/lymph: no swelling    VITALS: T(C): 36.7 (04-15-25 @ 17:15)  T(F): 98.1 (04-15-25 @ 17:15), Max: 98.1 (04-15-25 @ 17:15)  HR: 91 (04-15-25 @ 17:15) (87 - 91)  BP: 159/81 (04-15-25 @ 17:15) (138/81 - 159/81)  RR:  (20 - 22)  SpO2:  (100% - 100%)  Wt(kg): --  General: AAO x 3, appropriate mood and affect    Ophthalmology Exam:  Visual acuity (sc): 20/400 OD, 20/20 OS  Pupils: PERRL OU, + APD OD  Ttono: 17 OD, 17 OS  Extraocular movements (EOMs): Full OU, no pain, no diplopia  Confrontational Visual Field (CVF): Superonasal constriction OD, full OS  Color Plates: 6/12 OD, 12/12 OS    Pen Light Exam (PLE)  External: Flat OU  Lids/Lashes/Lacrimal Ducts: Flat OU    Sclera/Conjunctiva: W+Q OU  Cornea: Cl OU  Anterior Chamber: D+F OU    Iris: Flat OU  Lens: NS/CC OD>OS    Fundus Exam: dilated with 1% tropicamide and 2.5% phenylephrine  Approval obtained from primary team for dilation  Patient aware that pupils can remained dilated for at least 4-6 hours  Exam performed with 20D lens    Vitreous: wnl OU  Disc, cup/disc: sharp and pink, 0.3 OU  Macula: retinal whitening predominantly superiorly with faint cherry red spot OD. OS wnl.   Vessels: Sclerotic appearing vessel emanating from disc inferiorly OD, otherwise wnl.   Periphery: wnl OU    Labs/Imaging:  < from: CT Angio Neck Stroke Protocol w/ IV Cont (04.15.25 @ 16:11) >    IMPRESSION: Small vessel white matter ischemic changes. No hemorrhage.   Heterogeneous dense calvarium and cervical spine may be related to renal   osteodystrophy. CTA of the head and neck demonstrates calcification of   the carotid bifurcations bilaterally without significant stenosis as well   as distal V4 segment calcifications. CT perfusion demonstrates a small   amount of tissue with delayed mean transit time which may be artifactual   due to motion.    < end of copied text >

## 2025-04-15 NOTE — H&P ADULT - NSHPPHYSICALEXAM_GEN_ALL_CORE
Neurologic Exam:  Mental status - Awake, Alert, Oriented to person, place, and time. Speech fluent, repetition and naming intact. Follows simple and complex commands. Attention/concentration, recent and remote memory (including registration and recall), and fund of knowledge intact    Cranial nerves - PERRLA, right nasal visual field cut, EOMI, face sensation (V1-V3) intact b/l, facial strength intact without asymmetry b/l, hearing intact b/l, palate with symmetric elevation, trapezius  5/5 strength b/l, tongue midline on protrusion with full lateral movement    Motor - Normal bulk and tone throughout. No pronator drift.  Strength testing            Deltoid      Biceps      Triceps     Wrist Extension    Wrist Flexion     Interossei         R            5                 5               5                     5                              5                        5                 5  L             5                 5               5                     5                              5                        5                 5              Hip Flexion    Hip Extension    Knee Flexion    Knee Extension    Dorsiflexion    Plantar Flexion  R              5                           5                       5                           5                            5                          5  L              5                           5                        5                           5                            5                          5    Sensation - Light touch intact throughout    DTR's -  did not assess given focused neuro exam     Coordination - Finger to Nose intact b/l. No tremors appreciated    Gait and station - sawyer given c/f safety

## 2025-04-15 NOTE — H&P ADULT - NSHPADDITIONALINFOADULT_GEN_ALL_CORE
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 Last dose of apixaban at 10:30 AM 4/15/25.    Pre-mRS: 3.  LKN: 13:45 PM 4/15/25.  NIHSS: 0 (NIHSS does not account for monocular vision loss).    CTH: no acute pathology.  CTA: calcification of b/l cervical carotids without significant stenosis & distal V4 segment calcifications. (*** incomplete ***)  CTP: 0 mL core, 4 mL penumbra in L centrum semiovale.    Patient not a candidate for intravenous tenecteplase due to apixaban use within 3-48 hours of presentation with abnormal coagulation panel on arrival.  Patient not a candidate for mechanical thrombectomy due to no large vessel occlusion.    Patient was admitted from ED to NSCU overnight due to requiring hemodialysis as well as close neurological / ophthalmological monitoring in the event that symptoms worsened and would have potentially been eligible for intra-arterial alteplase (time window 15 hours from onset of symptoms).    Patient re-evaluated in NSCU during morning rounds. Eyes open, attends to examiner, oriented to age and month, fluent speech, follows commands, rAPD in OD, EOMI at least horizontally, visual loss throughout all visual fields of OD with exception of superotemporal visual field; no facial paresis; intact sensation b/l V1-V3; no drifts in b/l UE; 5/5 LUE hand , biceps, and triceps; 5/5 RUE hand  and biceps; 4-/5 (at best) RUE triceps (patient doesn't seem to notice); focused b/l LE examination d/t h/o b/l hip surgeries -- 5/5 b/l dorsiflexion & plantarflexion; 5/5 b/l hip flexion (limited exam with patient bracing his heels into the bed); sensation intact throughout.    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 Last dose of apixaban at 10:30 AM 4/15/25.    Pre-mRS: 3.  LKN: 13:45 PM 4/15/25.  NIHSS: 0 (NIHSS does not account for monocular vision loss).    CTH: no acute pathology.  CTA: calcification of b/l cervical carotids without significant stenosis & distal V4 segment calcifications. CTA imaging personally reviewed between myself and neuro-radiology -- ophthalmic artery and ophthalmic vein identified within the right orbit. CTA review of ophthalmic artery with contrast is inherently limited. Per neuro-radiology, the right ophthalmic artery appears to be normal, given the limitations inherent to CTA imaging.  CTP: 0 mL core, 4 mL penumbra in L centrum semiovale.    Patient not a candidate for intravenous tenecteplase due to apixaban use within 3-48 hours of presentation with abnormal coagulation panel on arrival.  Patient not a candidate for mechanical thrombectomy due to no large vessel occlusion.    Patient was admitted from ED to NSCU overnight due to requiring hemodialysis as well as close neurological / ophthalmological monitoring in the event that symptoms worsened and would have potentially been eligible for intra-arterial alteplase (time window 15 hours from onset of symptoms).    Patient re-evaluated in NSCU during morning rounds. Eyes open, attends to examiner, oriented to age and month, fluent speech, follows commands, rAPD in OD, EOMI at least horizontally, visual loss throughout all visual fields of OD with exception of superotemporal visual field; no facial paresis; intact sensation b/l V1-V3; no drifts in b/l UE; 5/5 LUE hand , biceps, and triceps; 5/5 RUE hand  and biceps; 4-/5 (at best) RUE triceps (patient doesn't seem to notice); focused b/l LE examination d/t h/o b/l hip surgeries -- 5/5 b/l dorsiflexion & plantarflexion; 5/5 b/l hip flexion (limited exam with patient bracing his heels into the bed); sensation intact throughout.    Impression: (1) Partial vision loss primarily sparing superotemporal visual field of OD due to OD cilioretinal artery occlusion. Mechanism likely due to cardioembolism from atrial fibrillation. (2) Proximal RUE weakness of unclear chronicity. Will rule out central etiology.    Recommendations:  - MRI brain w/o contrast.  - TTE w/o bubble: LVSF mildly decreased, regional wall motion abnormalities present, LA severely dilated, no mitral valve stenosis.  - A1c: 4.4%, LDL 59.  - Continue with home apixaban 5 mg BID.  - Continue with home aspirin 81 mg QD - cardiovascular indication. Can check ARU.  - Patient has been below systolic 140 mmHg since admission w/o anti-HTN medications. Okay to continue permissive for now.  - PT/OT.  - f/u outpatient Dr. Don Stone (neuro-ophthalmology).  - f/u outpatient neurovascular clinic at [address] [place] [address].    -  Frank Knox MD  Neurovascular Fellow .  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 Last dose of apixaban at 10:30 AM 4/15/25.    Pre-mRS: 3.  LKN: 13:45 PM 4/15/25.  NIHSS: 0 (NIHSS does not account for monocular vision loss).    CTH: no acute pathology.  CTA: calcification of b/l cervical carotids without significant stenosis & distal V4 segment calcifications. CTA imaging personally reviewed between myself and neuro-radiology -- ophthalmic artery and ophthalmic vein identified within the right orbit. CTA review of ophthalmic artery with contrast is inherently limited. Per neuro-radiology, the right ophthalmic artery appears to be normal, given the limitations inherent to CTA imaging.  CTP: 0 mL core, 4 mL penumbra in L centrum semiovale.    Patient not a candidate for intravenous tenecteplase due to apixaban use within 3-48 hours of presentation with abnormal coagulation panel on arrival.  Patient not a candidate for mechanical thrombectomy due to no large vessel occlusion.    Patient was admitted from ED to NSCU overnight due to requiring hemodialysis as well as close neurological / ophthalmological monitoring in the event that symptoms worsened and would have potentially been eligible for intra-arterial alteplase (time window 15 hours from onset of symptoms).    Patient re-evaluated in NSCU during morning rounds. Eyes open, attends to examiner, oriented to age and month, fluent speech, follows commands, rAPD in OD, EOMI at least horizontally, visual loss throughout all visual fields of OD with exception of superotemporal visual field; no facial paresis; intact sensation b/l V1-V3; no drifts in b/l UE; 5/5 LUE hand , biceps, and triceps; 5/5 RUE hand  and biceps; 4-/5 (at best) RUE triceps (patient doesn't seem to notice); focused b/l LE examination d/t h/o b/l hip surgeries -- 5/5 b/l dorsiflexion & plantarflexion; 5/5 b/l hip flexion (limited exam with patient bracing his heels into the bed); sensation intact throughout.    Impression: (1) Partial vision loss primarily sparing superotemporal visual field of OD due to OD cilioretinal artery occlusion. Mechanism likely due to cardioembolism from atrial fibrillation. (2) Proximal RUE weakness of unclear chronicity. Will rule out central etiology.    Recommendations:  - MRI brain w/o contrast.  - TTE w/o bubble: LVSF mildly decreased, regional wall motion abnormalities present, LA severely dilated, no mitral valve stenosis.  - A1c: 4.4%, LDL 59.  - Continue with home apixaban 5 mg BID.  - Continue with home aspirin 81 mg QD - cardiovascular indication. Can check ARU.  - Patient has been below systolic 140 mmHg since admission w/o anti-HTN medications. Okay to continue permissive for now.  - PT/OT.  - f/u outpatient Dr. Don Stone (neuro-ophthalmology).  - f/u outpatient neurovascular clinic at [address] [place] [address].    -  Frank Knox MD  Neurovascular Fellow .  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 Last dose of apixaban at 10:30 AM 4/15/25.    Pre-mRS: 3.  LKN: 13:45 PM 4/15/25.  NIHSS: 0 (NIHSS does not account for monocular vision loss).    CTH: no acute pathology.  CTA: calcification of b/l cervical carotids without significant stenosis & distal V4 segment calcifications. CTA imaging personally reviewed between myself and neuro-radiology -- ophthalmic artery and ophthalmic vein identified within the right orbit. CTA review of ophthalmic artery with contrast is inherently limited. Per neuro-radiology, the right ophthalmic artery appears to be normal, given the limitations inherent to CTA imaging.  CTP: 0 mL core, 4 mL penumbra in L centrum semiovale.    Patient not a candidate for intravenous tenecteplase due to apixaban use within 3-48 hours of presentation with abnormal coagulation panel on arrival.  Patient not a candidate for mechanical thrombectomy due to no large vessel occlusion.    Patient was admitted from ED to NSCU overnight due to requiring hemodialysis as well as close neurological / ophthalmological monitoring in the event that symptoms worsened and would have potentially been eligible for intra-arterial alteplase (time window 15 hours from onset of symptoms).    Patient re-evaluated in NSCU during morning rounds. Eyes open, attends to examiner, oriented to age and month, fluent speech, follows commands, rAPD in OD, EOMI at least horizontally, visual loss throughout all visual fields of OD with exception of superotemporal visual field; no facial paresis; intact sensation b/l V1-V3; no drifts in b/l UE; 5/5 LUE hand , biceps, and triceps; 5/5 RUE hand  and biceps; 4-/5 (at best) RUE triceps (patient doesn't seem to notice); focused b/l LE examination d/t h/o b/l hip surgeries -- 5/5 b/l dorsiflexion & plantarflexion; 5/5 b/l hip flexion (limited exam with patient bracing his heels into the bed); sensation intact throughout.    Impression: (1) Partial vision loss primarily sparing superotemporal visual field of OD due to OD cilioretinal artery occlusion. CTA with heavy calcifications at right carotid bulb and bifurcations but read as without significant stenosis. Will evaluate further with carotid dopplers. If no significant stenosis, mechanism of cilioretinal artery occlusion is likely due to cardioembolism from atrial fibrillation. (2) Proximal RUE weakness of unclear chronicity. Will rule out central etiology.    Recommendations:  - MRI brain w/o contrast.  - f/u carotid dopplers  - TTE w/o bubble: LVSF mildly decreased, regional wall motion abnormalities present, LA severely dilated, no mitral valve stenosis.  - A1c: 4.4%, LDL 59.  - Continue with home apixaban 5 mg BID.  - Continue with home aspirin 81 mg QD - cardiovascular indication. Can check ARU.  - Patient has been below systolic 140 mmHg since admission w/o anti-HTN medications. Okay to continue permissive for now.  - PT/OT.  - f/u outpatient Dr. Don Stone (neuro-ophthalmology).  - f/u outpatient neurovascular clinic at [address] [place] [address].    -  Frank Knox MD  Neurovascular Fellow

## 2025-04-15 NOTE — CHART NOTE - NSCHARTNOTEFT_GEN_A_CORE
CAPRINI SCORE [CLOT] Score on Admission for     AGE RELATED RISK FACTORS                                                       MOBILITY RELATED FACTORS  [X ] Age 41-60 years                                            (1 Point)                  [ ] Bed rest                                                        (1 Point)  [ ] Age: 61-74 years                                           (2 Points)                 [ ] Plaster cast                                                   (2 Points)  [ ] Age= 75 years                                              (3 Points)                 [ ] Bed bound for more than 72 hours                 (2 Points)    DISEASE RELATED RISK FACTORS                                               GENDER SPECIFIC FACTORS  [ ] Edema in the lower extremities                       (1 Point)                  [ ] Pregnancy                                                     (1 Point)  [ ] Varicose veins                                               (1 Point)                  [ ] Post-partum < 6 weeks                                   (1 Point)             [ ] BMI > 25 Kg/m2                                            (1 Point)                  [ ] Hormonal therapy  or oral contraception          (1 Point)                 [ ] Sepsis (in the previous month)                        (1 Point)                  [ ] History of pregnancy complications                 (1 point)  [ ] Pneumonia or serious lung disease                                               [ ] Unexplained or recurrent                     (1 Point)           (in the previous month)                               (1 Point)  [ ] Abnormal pulmonary function test                     (1 Point)                 SURGERY RELATED RISK FACTORS (include planned surgeries)  [ ] Acute myocardial infarction                              (1 Point)                 [ ]  Section                                             (1 Point)  [ ] Congestive heart failure (in the previous month)  (1 Point)                [X ] Minor surgery                                                  (1 Point)   [ ] Inflammatory bowel disease                             (1 Point)                 [ ] Arthroscopic surgery                                        (2 Points)  [ ] Central venous access                                      (2 Points)                [ ] General surgery lasting more than 45 minutes   (2 Points)       [X ] Stroke (in the previous month)                          (5 Points)               [ ] Elective arthroplasty                                         (5 Points)            [ ] current or past malignancy                              (2 Points)                                                                                                       HEMATOLOGY RELATED FACTORS                                                 TRAUMA RELATED RISK FACTORS  [ ] Prior episodes of VTE                                     (3 Points)                [ ] Fracture of the hip, pelvis, or leg                       (5 Points)  [ ] Positive family history for VTE                         (3 Points)                 [ ] Acute spinal cord injury (in the previous month)  (5 Points)  [ ] Prothrombin 55008 A                                     (3 Points)                 [ ] Paralysis  (less than 1 month)                             (5 Points)  [ ] Factor V Leiden                                             (3 Points)                  [ ] Multiple Trauma within 1 month                        (5 Points)  [ ] Lupus anticoagulants                                     (3 Points)                                                           [ ] Anticardiolipin antibodies                               (3 Points)                                                       [ ] High homocysteine in the blood                      (3 Points)                                             [ ] Other congenital or acquired thrombophilia      (3 Points)                                                [ ] Heparin induced thrombocytopenia                  (3 Points)                                          Total Score [ 7  ]    Risk:  Very low 0   Low 1 to 2   Moderate 3 to 4   High =5       VTE Prophylasix Recommednations:  [X ] mechanical pneumatic compression devices                                      [ ] contraindicated: _____________________  [ ] chemo prophylasix                                                                                   [X ] contraindicated crao    **** HIGH LIKELIHOOD DVT PRESENT ON ADMISSION  [X ] (please order LE dopplers within 24 hours of admission)

## 2025-04-15 NOTE — PATIENT PROFILE ADULT - FALL HARM RISK - HARM RISK INTERVENTIONS

## 2025-04-15 NOTE — H&P ADULT - ATTENDING COMMENTS
I reviewed available diagnostic studies, and reviewed images personally. I agree with resident & fellow's history, exam, orders placed, and plan of care. Total care time spent, 75 min. This excludes any time spent on separate procedures or teaching. Medical issues needing to be addressed include: Central retinal artery occlusion of R eye, monocular vision deficit, ESRD on HD,  cerebral athero, Afib on Eliquis. Partial vision loss, YVONNE aware, was monitored in NSCU for 15 hrs for any worsening for potential IA alteplase. Vision stayed the same, exam as above. Pt does have mild RUE weakness also with questionable L hemispheric perfusion changes. F up MRI brain, orbits, echo, esr, crp. A1C 4.4, LDL 59. LLE exam with decreased strength but is very pain limited. Cont asa Eliquis at this time and atorvastatin 80mg. Service provided on 4/16/2025.

## 2025-04-15 NOTE — PROGRESS NOTE ADULT - ASSESSMENT
ASSESSMENT: HPI:  59M with multiple vascular risk factors including a fib on eliquis, last dose 10:30AM 4/15 and CAD s/p stents on ASA 81 mg p/w right monocular visual field deficit 2/2 to Right CRAO    NIHSS:2  preMRS:3, uses a wheelchair and walker independently due to b/l hip replacements     NEURO:  Right monocular visual deficit 2/2 CRAO  -Neuro Q1H  -CTA showed right carotid calcifications, Carotid US pending.   -c/w ASA and Eliquis  -Stroke following  -Stroke core measure sent  -Opthalmology consulted, recommended ESR and CRP  -MR brain and orbits w/wo pending  -IR consulted pending plan for intra-arterial TPA  -Pain control w/ tylenol PRN  Activity: [X] OOB as tolerated [] Bedrest [X] PT [X] OT [] PMNR    PULM:  -Incentive spirometry, mobilize as tolerated  -Room air, SpO2>92%    CV:  Hx Afib on Eliquis 5mg BID  -Permissive HTN, SBP goal 100-200  -c/w metoprolol 12.5mg BID  -Holding home dose isosorbide 30mg at this time  -TTE pending  -Lipid panel pending  -c/w atorvastatin 80mg daily    RENAL:  Hx ESRD on HD T/Th/S  -HD today  -Hyperkalemia 6.9    GI:  -Diet: NPO pending Dysphagia screen  -GI prophylaxis [] not indicated [X] PPI [] other: home dose   -Bowel regimen [] colace [X] senna [] other: miralax  -LBM: PTA    ENDO:   -Goal euglycemia (-180)  -A1c and thyroid panel pending    HEME/ONC:  -LED pending  -SCDs    ID:  -Afebrile    MISC:    SOCIAL/FAMILY:  [] awaiting [X] updated at bedside [] family meeting    CODE STATUS:  [X] Full Code [] DNR [] DNI [] Palliative/Comfort Care    DISPOSITION:  [X] ICU [] Stroke Unit [] Floor [] EMU [] RCU [] PCU    Contact: [phone]

## 2025-04-15 NOTE — H&P ADULT - HISTORY OF PRESENT ILLNESS
EDGAR CLAUDIO is a 59y (1965) man with a PMHx significant for atrial fibrillation on eliquis 5 mg bid, HTN, CABG 2014, ESRD on T/Th/S dialysis 2/2 PKD, CAD s/p stents on ASA 81 mg presenting with acute onset right eye blurry vision after taking shower. LKW 1:45 PM 4/15. Denies HA, weakness, numbness, vision loss. Denies prior hx of stroke.     Last eliquis dose 10:30 AM 4/15.     NIHSS:2  preMRS:3, walks with walker independently due to b/l hip replacements     Not a tenecteplase candidate given abnormal coags iso eliquis use  Not a thrombectomy candidate given no LVO

## 2025-04-16 ENCOUNTER — RESULT REVIEW (OUTPATIENT)
Age: 60
End: 2025-04-16

## 2025-04-16 LAB
A1C WITH ESTIMATED AVERAGE GLUCOSE RESULT: 4.4 % — SIGNIFICANT CHANGE UP (ref 4–5.6)
CHOLEST SERPL-MCNC: 115 MG/DL — SIGNIFICANT CHANGE UP
ESTIMATED AVERAGE GLUCOSE: 80 MG/DL — SIGNIFICANT CHANGE UP (ref 68–114)
HDLC SERPL-MCNC: 32 MG/DL — LOW
LDLC SERPL-MCNC: 59 MG/DL — SIGNIFICANT CHANGE UP
LIPID PNL WITH DIRECT LDL SERPL: 59 MG/DL — SIGNIFICANT CHANGE UP
MRSA PCR RESULT.: DETECTED
NONHDLC SERPL-MCNC: 83 MG/DL — SIGNIFICANT CHANGE UP
S AUREUS DNA NOSE QL NAA+PROBE: DETECTED
T3 SERPL-MCNC: 102 NG/DL — SIGNIFICANT CHANGE UP (ref 80–200)
T4 AB SER-ACNC: 6.9 UG/DL — SIGNIFICANT CHANGE UP (ref 4.6–12)
T4 FREE SERPL-MCNC: 1 NG/DL — SIGNIFICANT CHANGE UP (ref 0.9–1.8)
TRIGL SERPL-MCNC: 131 MG/DL — SIGNIFICANT CHANGE UP
TSH SERPL-MCNC: 1.92 UIU/ML — SIGNIFICANT CHANGE UP (ref 0.27–4.2)

## 2025-04-16 PROCEDURE — 99233 SBSQ HOSP IP/OBS HIGH 50: CPT

## 2025-04-16 PROCEDURE — 70543 MRI ORBT/FAC/NCK W/O &W/DYE: CPT | Mod: 26

## 2025-04-16 PROCEDURE — 93306 TTE W/DOPPLER COMPLETE: CPT | Mod: 26

## 2025-04-16 PROCEDURE — 99223 1ST HOSP IP/OBS HIGH 75: CPT

## 2025-04-16 PROCEDURE — 93880 EXTRACRANIAL BILAT STUDY: CPT | Mod: 26

## 2025-04-16 PROCEDURE — 93970 EXTREMITY STUDY: CPT | Mod: 26

## 2025-04-16 PROCEDURE — 93010 ELECTROCARDIOGRAM REPORT: CPT

## 2025-04-16 PROCEDURE — 70553 MRI BRAIN STEM W/O & W/DYE: CPT | Mod: 26

## 2025-04-16 RX ORDER — ATORVASTATIN CALCIUM 80 MG/1
80 TABLET, FILM COATED ORAL AT BEDTIME
Refills: 0 | Status: DISCONTINUED | OUTPATIENT
Start: 2025-04-16 | End: 2025-04-18

## 2025-04-16 RX ORDER — MUPIROCIN CALCIUM 20 MG/G
1 CREAM TOPICAL
Refills: 0 | Status: DISCONTINUED | OUTPATIENT
Start: 2025-04-16 | End: 2025-04-18

## 2025-04-16 RX ADMIN — POLYETHYLENE GLYCOL 3350 17 GRAM(S): 17 POWDER, FOR SOLUTION ORAL at 17:10

## 2025-04-16 RX ADMIN — Medication 81 MILLIGRAM(S): at 11:27

## 2025-04-16 RX ADMIN — MUPIROCIN CALCIUM 1 APPLICATION(S): 20 CREAM TOPICAL at 06:06

## 2025-04-16 RX ADMIN — POLYETHYLENE GLYCOL 3350 17 GRAM(S): 17 POWDER, FOR SOLUTION ORAL at 06:06

## 2025-04-16 RX ADMIN — MUPIROCIN CALCIUM 1 APPLICATION(S): 20 CREAM TOPICAL at 17:10

## 2025-04-16 RX ADMIN — ATORVASTATIN CALCIUM 80 MILLIGRAM(S): 80 TABLET, FILM COATED ORAL at 21:20

## 2025-04-16 RX ADMIN — METOPROLOL SUCCINATE 12.5 MILLIGRAM(S): 50 TABLET, EXTENDED RELEASE ORAL at 17:10

## 2025-04-16 RX ADMIN — APIXABAN 5 MILLIGRAM(S): 2.5 TABLET, FILM COATED ORAL at 17:11

## 2025-04-16 RX ADMIN — Medication 40 MILLIGRAM(S): at 06:07

## 2025-04-16 RX ADMIN — APIXABAN 5 MILLIGRAM(S): 2.5 TABLET, FILM COATED ORAL at 06:06

## 2025-04-16 NOTE — SPEECH LANGUAGE PATHOLOGY EVALUATION - SLP DIAGNOSIS
Pt is a 58 y/o male who presents with functional receptive and expressive language skills. However, reduced sustained attention suspected to impact comprehension with material of increased complexity. In addition, impairments of executive function were evident.

## 2025-04-16 NOTE — PROGRESS NOTE ADULT - ASSESSMENT
Assessment and Recommendations:  59y male with PMHx atrial fibrillation (on AC), HTN, CAD s/p stents, CABG, ESRD on HD presenting to ED for acute onset blurred vision in the right eye, found to have ciloretinal artery occlusion OD and likely CRAO OD.     #Suspected CRAO OD  #Ciloretinal artery Occlusion OD  -Patient presenting to ED with acute onset blurred vision starting at 1:45 PM today  -No HA, scalp tenderness, jaw claudication, fevers, or diplopia   -VA 20/400 OD with + APD OD. CVF with superonasal constriction OD, full OS. Color 6/12 OD, 12/12 OS.   -On fundus exam, found to have retinal whitening involving the superior portion of the fovea OD. OS wnl.  -Examination findings consistent with cilioretinal artery occlusion OD. However, findings do not explain degree of APD identified on exam and degree of visual field loss. High suspicion for early CRAO.   -ESR slightly elevated at 40, but CRP <3 and GCA ROS negative. Low suspicion for GCA at this time.   -Not a candidate for intra-arterial tPA per interventional neuro  -Recommend full stroke work-up including MRI brain & orbits w/ and w/o contrast   -Ophtho to follow     Discussed with Dr. Briceno, consult attending and Dr. Kraft, retina attending       Outpatient follow-up: Patient should follow-up with his/her ophthalmologist or with [facility] Department of Ophthalmology at the address below     [address]  [place] [address]  [phone]     Assessment and Recommendations:  59y male with PMHx atrial fibrillation (on AC), HTN, CAD s/p stents, CABG, ESRD on HD presenting to ED for acute onset blurred vision in the right eye, found to have ciloretinal artery occlusion OD and likely CRAO OD.     #Suspected CRAO OD  #Ciloretinal artery Occlusion OD  -Patient presenting to ED with acute onset blurred vision starting at 1:45 PM today  -No HA, scalp tenderness, jaw claudication, fevers, or diplopia   -On initial exam, VA 20/400 OD with + APD OD. CVF with superonasal constriction OD, full OS. Color 6/12 OD, 12/12 OS.   -On fundus exam, found to have retinal whitening involving the superior portion of the fovea OD. OS wnl.  -Examination findings consistent with cilioretinal artery occlusion OD. However, findings do not explain degree of APD identified on exam and degree of visual field loss. High suspicion for early CRAO.   -4/16: VA slightly worsened at 20/800 eccentrically with + APD OD.   -ESR slightly elevated at 40, but CRP <3 and GCA ROS negative. Low suspicion for GCA at this time.   -Not a candidate for intra-arterial tPA per interventional neuro  -Recommend full stroke work-up including MRI brain & orbits w/ and w/o contrast   -Ophtho to follow     Discussed with Dr. Briceno, consult attending and Dr. Kraft, retina attending       Outpatient follow-up: Patient should follow-up with his/her ophthalmologist or with [facility] Department of Ophthalmology at the address below     [address] [place] [address]. [address]  [place] [address]  [phone]

## 2025-04-16 NOTE — PROGRESS NOTE ADULT - ATTENDING COMMENTS
59yo man adm CRAO R partial monocular vision loss, deemed not a candidate for IA tPA  Eliquis for afib, ASA for CABG in 2024, CAD s/p stents, ESRD on HD T/Th/Sa  vitals reviewed  no drips, afebrile  labs reviewed  HD 4/15  meds reviewed  ASA, Eliquis, lipitor 80, 12.5mg metoprolol Q12  vision grossly unchanged this am, patient reports no changes since admission     Q2 checks  ophtho consult -->rec ESR/CRP  stroke core measures  carotid US pending R ICA calcification   defer MRI/further imaging to stroke neurology   cont ASA/Eliquis  tylenol prn for pain control  PT/OT OOB as tolerated  -180  RA/IS  renal diet   LBM 4/15, cont bowel regimen  IVL, renal consulted for HD  maintain euglycemia, A1C 4.4  BLE dopplers pending  on Eliquis  afebrile  appropriate for stroke unit transfer   not critically ill but medically complex 55min spent separate from teaching/procedures

## 2025-04-16 NOTE — PROGRESS NOTE ADULT - SUBJECTIVE AND OBJECTIVE BOX
[facility] DEPARTMENT OF OPHTHALMOLOGY  ------------------------------------------------------------------------------  Swati Hernandez MD PGY-3  ------------------------------------------------------------------------------    Interval History: No acute events overnight.  Reports vision in the right eye is unchanged.     MEDICATIONS  (STANDING):  apixaban 5 milliGRAM(s) Oral two times a day  aspirin  chewable 81 milliGRAM(s) Oral daily  atorvastatin 80 milliGRAM(s) Oral at bedtime  metoprolol tartrate 12.5 milliGRAM(s) Oral every 12 hours  mupirocin 2% Nasal 1 Application(s) Both Nostrils two times a day  pantoprazole    Tablet 40 milliGRAM(s) Oral before breakfast  polyethylene glycol 3350 17 Gram(s) Oral two times a day  senna 2 Tablet(s) Oral at bedtime    MEDICATIONS  (PRN):  acetaminophen     Tablet .. 650 milliGRAM(s) Oral every 6 hours PRN Temp greater or equal to 38C (100.4F), Mild Pain (1 - 3)      VITALS: T(C): 36.3 (04-16-25 @ 17:01)  T(F): 97.3 (04-16-25 @ 17:01), Max: 98.7 (04-16-25 @ 00:22)  HR: 76 (04-16-25 @ 17:01) (68 - 98)  BP: 120/82 (04-16-25 @ 17:01) (97/56 - 137/80)  RR:  (16 - 24)  SpO2:  (93% - 100%)  Wt(kg): --  General: AAO x 3, appropriate mood and affect    Ophthalmology Exam:  Visual acuity (sc): 20/800 eccentrically OD  Pupils: PERRL OU, + APD OD  Ttono: STP OU  Extraocular movements (EOMs): Full OU, no pain, no diplopia    Pen Light Exam (PLE)  External: Flat OU  Lids/Lashes/Lacrimal Ducts: Flat OU    Sclera/Conjunctiva: W+Q OU  Cornea: Cl OU  Anterior Chamber: D+F OU    Iris: Flat OU  Lens: NS/CC OD>OS

## 2025-04-16 NOTE — PROGRESS NOTE ADULT - SUBJECTIVE AND OBJECTIVE BOX
HOSPITAL COURSE:  59y man with a PMHx significant for atrial fibrillation on eliquis 5 mg BID (last dose 4/15 at 10:30 am), HTN, CABG 2014, ESRD on T/Th/S dialysis 2/2 PKD, CAD s/p stents on ASA 81 mg presenting with acute onset right eye blurry vision after taking shower. LKW 1:45 PM 4/15. Denies HA, weakness, numbness, vision loss. Denies prior hx of stroke.     NIHSS:2         GCS: 15  preMRS:3, walks with walker independently due to b/l hip replacements     Not a tenecteplase candidate given abnormal coags iso eliquis use  Not a thrombectomy candidate given no LVO     Admission Scores  NIHSS: 2     ICU COURSE:  4/15: Tx to NSCU for close monitoring.     Allergies    No Known Allergies    Intolerances    REVIEW OF SYSTEMS: [ ] Unable to Assess due to neurologic exam   [X ] All ROS addressed below are non-contributory, except:  Neuro: [ ] Headache [ ] Back pain [ ] Numbness [ ] Weakness [ ] Ataxia [ ] Dizziness [ ] Aphasia [ ] Dysarthria [X ] Visual disturbance  Resp: [ ] Shortness of breath/dyspnea, [ ] Orthopnea [ ] Cough  CV: [ ] Chest pain [ ] Palpitation [ ] Lightheadedness [ ] Syncope  Renal: [ ] Thirst [ ] Edema  GI: [ ] Nausea [ ] Emesis [ ] Abdominal pain [ ] Constipation [ ] Diarrhea  Hem: [ ] Hematemesis [ ] bright red blood per rectum  ID: [ ] Fever [ ] Chills [ ] Dysuria  ENT: [ ] Rhinorrhea      DEVICES:   [ ] Restraints [ ] ET tube [ ] central line [ ] arterial line [ ] hayes [ ] NGT/OGT [ ] EVD [ ] LD [ ] DEVAUGHN/HMV [ ] Trach [ ] PEG [ ] Chest Tube         ICU Vital Signs Last 24 Hrs  T(C): 36.9 (16 Apr 2025 03:00), Max: 37.1 (16 Apr 2025 00:22)  T(F): 98.5 (16 Apr 2025 03:00), Max: 98.7 (16 Apr 2025 00:22)  HR: 82 (16 Apr 2025 06:00) (71 - 98)  BP: 105/67 (16 Apr 2025 06:00) (98/56 - 159/81)  BP(mean): 81 (16 Apr 2025 06:00) (73 - 113)  ABP: --  ABP(mean): --  RR: 20 (16 Apr 2025 06:00) (16 - 22)  SpO2: 99% (16 Apr 2025 06:00) (98% - 100%)    O2 Parameters below as of 15 Apr 2025 20:50  Patient On (Oxygen Delivery Method): room air            I&O's Summary    15 Apr 2025 07:01  -  16 Apr 2025 00:27  --------------------------------------------------------  IN: 0 mL / OUT: 1500 mL / NET: -1500 mL      LABS:                        9.4    3.57  )-----------( 112      ( 15 Apr 2025 15:58 )             29.0     04-15    136  |  99  |  51[H]  ----------------------------<  49[LL]  5.2   |  20[L]  |  7.60[H]             MEDICATION LEVELS:     IVF FLUIDS/MEDICATIONS:   MEDICATIONS  (STANDING):  apixaban 5 milliGRAM(s) Oral two times a day  aspirin  chewable 81 milliGRAM(s) Oral daily  atorvastatin 10 milliGRAM(s) Oral at bedtime  chlorhexidine 4% Liquid 1 Application(s) Topical <User Schedule>  metoprolol tartrate 12.5 milliGRAM(s) Oral every 12 hours  pantoprazole    Tablet 40 milliGRAM(s) Oral before breakfast  polyethylene glycol 3350 17 Gram(s) Oral two times a day  senna 2 Tablet(s) Oral at bedtime    MEDICATIONS  (PRN):  acetaminophen     Tablet .. 650 milliGRAM(s) Oral every 6 hours PRN Temp greater or equal to 38C (100.4F), Mild Pain (1 - 3)        IMAGING:  < from: CT Angio Brain Stroke Protocol  w/ IV Cont (04.15.25 @ 16:10) >  IMPRESSION: Small vessel white matter ischemic changes. No hemorrhage.   Heterogeneous dense calvarium and cervical spine may be related to renal   osteodystrophy. CTA of the head and neck demonstrates calcification of   the carotid bifurcations bilaterally without significant stenosis as well   as distal V4 segment calcifications. CT perfusion demonstrates a small   amount of tissue with delayed mean transit time which may be artifactual   due to motion.      EXAMINATION:  PHYSICAL EXAM:    Constitutional: No Acute Distress     Neurological: AOx3, EOMI, PERRL, FC, R nasal visual field cut, RUE 5/5 except triceps 4/5, LUE 5/5, RLE 3/5, LLE 3/5 w/ assistance (baseline per pt). Ambulates w/ walker and wheelchair.     Pulmonary: Clear to Auscultation, No rales, No rhonchi, No wheezes     Cardiovascular: S1, S2, Regular rate and rhythm     Gastrointestinal: Soft, Non-tender, Non-distended     Extremities: No calf tenderness

## 2025-04-16 NOTE — PHYSICAL THERAPY INITIAL EVALUATION ADULT - PERTINENT HX OF CURRENT PROBLEM, REHAB EVAL
PMHx: atrial fibrillation on eliquis 5mg bid, HTN, CABG 2014, ESRD on T/Th/S dialysis due to PKD, CAD s/p stents on ASA 81mg. presenting with acute onset R eye blurry vision after taking shower. LKW 1:45 PM 4/15. Denies HA, weakness, numbness, vision loss. Denies prior hx of stroke. Last eliquis dose 10:30 AM 4/15. NIHSS:2. CTH neg. Not a tenecteplase candidate given abnormal coags iso eliquis use. Not a thrombectomy candidate given no LVO. 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 No hemorrhage. Heterogeneous dense calvarium and cervical spine may be related to renal osteodystrophy.   CTA head/neck: calcification of the carotid bifurcations bilaterally without significant stenosis as well as distal V4 segment calcifications.   CTP: small amount of tissue with delayed mean transit time which may be artifactual due to motion. PMHx: atrial fibrillation on eliquis 5mg bid, HTN, CABG 2014, ESRD on T/Th/S dialysis due to PKD, CAD s/p stents on ASA 81mg. presenting with acute onset R eye blurry vision after taking shower. LKW 1:45 PM 4/15. Denies HA, weakness, numbness, vision loss. Denies prior hx of stroke. Last eliquis dose 10:30 AM 4/15. NIHSS:2. CTH neg. Not a tenecteplase candidate given abnormal coags iso eliquis use. Not a thrombectomy candidate given no LVO. 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 No hemorrhage. Heterogeneous dense calvarium and cervical spine may be related to renal osteodystrophy.   CTA head/neck: calcification of the carotid bifurcations bilaterally without significant stenosis as well as distal V4 segment calcifications.   CTP: small amount of tissue with delayed mean transit time which may be artifactual due to motion.    MR brain and orbits: Moderate periventricular and marked deep and subcortical chronic white matter ischemia. Empty sella syndrome is noted. Prominent optic nerve root sheaths with kinking. These findings may indicate benign idiopathic intracranial hypertension.

## 2025-04-16 NOTE — OCCUPATIONAL THERAPY INITIAL EVALUATION ADULT - VISUAL ASSESSMENT: VISUAL NEGLECT
Retention Suture Text: Retention sutures were placed to support the closure and prevent dehiscence. none

## 2025-04-16 NOTE — OCCUPATIONAL THERAPY INITIAL EVALUATION ADULT - VISUAL ASSESSMENT: TRACKING
R WNL. L eye impaired + c/o blurred vision , but able to track L WNL. R eye impaired + c/o blurred vision , but able to track

## 2025-04-16 NOTE — OCCUPATIONAL THERAPY INITIAL EVALUATION ADULT - BALANCE TRAINING, PT EVAL
GOAL: Pt will demonstrate improved  dynamic standing balance by one grade while completing grooming task at sink independently in 3 weeks.

## 2025-04-16 NOTE — PROGRESS NOTE ADULT - SUBJECTIVE AND OBJECTIVE BOX
Overnight events noted      VITAL:  T(C): , Max: 37.1 (04-16-25 @ 00:22)  T(F): , Max: 98.7 (04-16-25 @ 00:22)  HR: 82 (04-16-25 @ 06:00)  BP: 105/67 (04-16-25 @ 06:00)  BP(mean): 81 (04-16-25 @ 06:00)  RR: 20 (04-16-25 @ 06:00)  SpO2: 99% (04-16-25 @ 06:00)  Wt(kg): --      PHYSICAL EXAM:  Constitutional: NAD, Alert  HEENT: NCAT, MMM  Neck: Supple, No JVD  Respiratory: CTA-b/l  Cardiovascular: RRR s1s2, no m/r/g  Gastrointestinal: BS+, soft, NT/ND  Extremities: No peripheral edema b/l  Neurological: no focal deficits; strength grossly intact  Back: no CVAT b/l  Skin: No rashes, no nevi    LABS:                        9.4    3.57  )-----------( 112      ( 15 Apr 2025 15:58 )             29.0     Na(136)/K(5.2)/Cl(99)/HCO3(20)/BUN(51)/Cr(7.60)Glu(49)/Ca(8.1)/Mg(--)/PO4(--)    04-15 @ 18:19  Na(136)/K(6.9)/Cl(100)/HCO3(21)/BUN(52)/Cr(7.53)Glu(109)/Ca(8.5)/Mg(--)/PO4(--)    04-15 @ 15:58      IMAGING  < from: CT Angio Neck Stroke Protocol w/ IV Cont (04.15.25 @ 16:11) >  IMPRESSION: Small vessel white matter ischemic changes. No hemorrhage.   Heterogeneous dense calvarium and cervical spine may be related to renal   osteodystrophy. CTA of the head and neck demonstrates calcification of   the carotid bifurcations bilaterally without significant stenosis as well   as distal V4 segment calcifications. CT perfusion demonstrates a small   amount of tissue with delayed mean transit time which may be artifactual   due to motion.            IMPRESSION: 59M w/ HTN, CAD-CABG, HCV, and UUDVN-DCKC-XR, 4/14/25 p/w acute right monocular visual field deficit 2/2 to Right CRAO    (1)Renal - ESRD - HD TTS - last dialyzed yesterday; due for next HD tomorrow    (2)Hyperkalemia - much improved, s/p HD last night    (3)Ophtho/Neuro - acute CRAO - in NSICU - Optho and Stroke teams on board - MR pending - IR consulted/planned for intraarterial TPA. Would plan for HD directly post MR (and day post MR) for gadolinium clearance/to minimize risk of nephrogenic systemic fibrosis.      RECOMMEND:  (1)Assuming MR with/without contrast to take place today, will plan for HD directly after MR today, and will plan for HD tomorrow  (2)Renal diet once diet reinitiated  (3)BMP at least q12h - Lokelma 10gm PO prn K >5.5; could give SPS 15gm [place] if unable to take meds PO. Would give D50/Insulin/Albuterol prn K>6 and would plan for urgent HD if K>6.  (4)Meds for GFR<10/HD             Simon Andersen MD  [facility]  Office/on call physician: [phone]  Cell (7a-7p): [phone]       (+)blurry vision on R; no other complaints      VITAL:  T(C): , Max: 37.1 (04-16-25 @ 00:22)  T(F): , Max: 98.7 (04-16-25 @ 00:22)  HR: 82 (04-16-25 @ 06:00)  BP: 105/67 (04-16-25 @ 06:00)  BP(mean): 81 (04-16-25 @ 06:00)  RR: 20 (04-16-25 @ 06:00)  SpO2: 99% (04-16-25 @ 06:00)  Wt(kg): --      PHYSICAL EXAM:  Constitutional: NAD, Alert  HEENT: NCAT, MMM  Neck: Supple, No JVD  Respiratory: CTA-b/l  Cardiovascular: RRR s1s2, no m/r/g  Gastrointestinal: BS+, soft, NT/ND  Extremities: No peripheral edema b/l  Neurological: no focal deficits; strength grossly intact  Back: no CVAT b/l  Skin: No rashes, no nevi    LABS:                        9.4    3.57  )-----------( 112      ( 15 Apr 2025 15:58 )             29.0     Na(136)/K(5.2)/Cl(99)/HCO3(20)/BUN(51)/Cr(7.60)Glu(49)/Ca(8.1)/Mg(--)/PO4(--)    04-15 @ 18:19  Na(136)/K(6.9)/Cl(100)/HCO3(21)/BUN(52)/Cr(7.53)Glu(109)/Ca(8.5)/Mg(--)/PO4(--)    04-15 @ 15:58      IMAGING  < from: CT Angio Neck Stroke Protocol w/ IV Cont (04.15.25 @ 16:11) >  IMPRESSION: Small vessel white matter ischemic changes. No hemorrhage.   Heterogeneous dense calvarium and cervical spine may be related to renal   osteodystrophy. CTA of the head and neck demonstrates calcification of   the carotid bifurcations bilaterally without significant stenosis as well   as distal V4 segment calcifications. CT perfusion demonstrates a small   amount of tissue with delayed mean transit time which may be artifactual   due to motion.            IMPRESSION: 59M w/ HTN, CAD-CABG, HCV, and [id number], 4/14/25 p/w acute right monocular visual field deficit 2/2 to Right CRAO    (1)Renal - ESRD - HD TTS - last dialyzed yesterday; due for next HD tomorrow    (2)Hyperkalemia - much improved, s/p HD last night    (3)Ophtho/Neuro - acute CRAO - in NSICU - Optho and Stroke teams on board - MR pending - IR consulted/planned for intraarterial TPA. Would plan for HD directly post MR (and day post MR) for gadolinium clearance/to minimize risk of nephrogenic systemic fibrosis.      RECOMMEND:  (1)Assuming MR with/without contrast to take place today, will plan for HD directly after MR today, and will plan for HD tomorrow  (2)Renal diet once diet reinitiated  (3)BMP at least q12h - Lokelma 10gm PO prn K >5.5; could give SPS 15gm [place] if unable to take meds PO. Would give D50/Insulin/Albuterol prn K>6 and would plan for urgent HD if K>6.  (4)Meds for GFR<10/HD             Simon Andersen MD  [facility]  Office/on call physician: [phone]  Cell (7a-7p): [phone]

## 2025-04-16 NOTE — PHYSICAL THERAPY INITIAL EVALUATION ADULT - ADDITIONAL COMMENTS
lives with wife in private house with steps to enter with bilateral rails but also a ramp, no steps inside, right hand dominant lives with wife in private house with steps to enter with bilateral rails but also a ramp, no steps inside, right hand dominant, +shower bench

## 2025-04-16 NOTE — PROGRESS NOTE ADULT - ASSESSMENT
ASSESSMENT: HPI:  59M with multiple vascular risk factors including a fib on eliquis, last dose 10:30AM 4/15 and CAD s/p stents on ASA 81 mg p/w right monocular visual field deficit 2/2 to Right CRAO    NIHSS:2  preMRS:3, uses a wheelchair and walker independently due to b/l hip replacements     NEURO:  Right monocular visual deficit 2/2 CRAO  -Neuro Q1H  -CTA showed right carotid calcifications, Carotid US pending.   -c/w ASA and Eliquis  -Stroke following  -Stroke core measure sent  -Opthalmology consulted, recommended ESR and CRP  -MR brain and orbits w/wo pending  -IR consulted pending plan for intra-arterial TPA  -Pain control w/ tylenol PRN  Activity: [X] OOB as tolerated [] Bedrest [X] PT [X] OT [] PMNR    PULM:  -Incentive spirometry, mobilize as tolerated  -Room air, SpO2>92%    CV:  Hx Afib on Eliquis 5mg BID  -Permissive HTN, SBP goal 100-200  -c/w metoprolol 12.5mg BID  -Holding home dose isosorbide 30mg at this time  -TTE pending  -Lipid panel pending  -c/w atorvastatin 80mg daily    RENAL:  Hx ESRD on HD T/Th/S  -HD today  -Hyperkalemia 6.9    GI:  -Diet: NPO pending Dysphagia screen  -GI prophylaxis [] not indicated [X] PPI [] other: home dose   -Bowel regimen [] colace [X] senna [] other: miralax  -LBM: PTA    ENDO:   -Goal euglycemia (-180)  -A1c and thyroid panel pending    HEME/ONC:  -LED pending  -SCDs    ID:  -Afebrile    MISC:    SOCIAL/FAMILY:  [] awaiting [X] updated at bedside [] family meeting    CODE STATUS:  [X] Full Code [] DNR [] DNI [] Palliative/Comfort Care    DISPOSITION:  [X] ICU [] Stroke Unit [] Floor [] EMU [] RCU [] PCU    Contact: [phone] ASSESSMENT: HPI:  59M with multiple vascular risk factors including a fib on eliquis, last dose 10:30AM 4/15 and CAD s/p stents on ASA 81 mg p/w right monocular visual field deficit 2/2 to Right CRAO    NIHSS:2  preMRS:3, uses a wheelchair and walker independently due to b/l hip replacements     NEURO:  Right monocular visual deficit 2/2 CRAO  -Neuro Q2H  -CTA showed right carotid calcifications, Carotid US pending.   -C/w ASA81 and Eliquis 5BID  -Stroke following  -Stroke core measure sent  -Opthalmology consulted, recommended ESR and CRP  -MR brain and orbits w/wo pending  -Pain control w/ tylenol PRN  Activity: [X] OOB as tolerated [] Bedrest [X] PT [X] OT [] PMNR    PULM:  -Incentive spirometry, mobilize as tolerated  -Room air, SpO2>92%    CV:  Hx Afib on Eliquis 5mg BID  -Permissive HTN, SBP goal 100-180  -c/w metoprolol 12.5mg BID  -Holding home dose isosorbide 30mg at this time  -TTE pending  -Lipid panel wnl  -c/w atorvastatin 80mg daily    RENAL:  IVL  Hx ESRD on HD T/Th/S    GI:  -Diet: Renal  -GI prophylaxis [] not indicated [X] PPI [] other: home dose   -Bowel regimen [] colace [X] senna [] other: miralax  -LBM: PTA    ENDO:   -Goal euglycemia (-180)  -A1c 4.4  -TSH wnl    HEME/ONC:  -LED pending  -SCDs    ID:  -Afebrile    MISC:    SOCIAL/FAMILY:  [x] awaiting [] updated at bedside [] family meeting    CODE STATUS:  [X] Full Code [] DNR [] DNI [] Palliative/Comfort Care    DISPOSITION:  [] ICU [x] Stroke Unit [] Floor [] EMU [] RCU [] PCU

## 2025-04-16 NOTE — OCCUPATIONAL THERAPY INITIAL EVALUATION ADULT - NSOTDISCHREC_GEN_A_CORE
follow-up with neuro ophthalmologist. Pt would benefit from home OT to ensure independence and safety with all ADLs/mobility in home environment. Pt owns all needed DME (shower stool, rw, and w/c)

## 2025-04-16 NOTE — PROGRESS NOTE ADULT - SUBJECTIVE AND OBJECTIVE BOX
Patient seen and examined at bedside.    --Anticoagulation--  apixaban 5 milliGRAM(s) Oral two times a day  aspirin  chewable 81 milliGRAM(s) Oral daily    T(C): 37.1 (04-16-25 @ 00:22), Max: 37.1 (04-16-25 @ 00:22)  HR: 71 (04-16-25 @ 00:22) (71 - 98)  BP: 114/61 (04-16-25 @ 00:22) (114/61 - 159/81)  RR: 18 (04-16-25 @ 00:22) (17 - 22)  SpO2: 100% (04-16-25 @ 00:22) (98% - 100%)  Wt(kg): --    Exam:AOx3, EOMI, PERRL, FC, R nasal visual field cut, RUE 5/5 except triceps 4/5, LUE 5/5, RLE 3/5, LLE 3/5 w/ assistance (baseline per pt). Ambulates w/ walker and wheelchair.

## 2025-04-16 NOTE — PROGRESS NOTE ADULT - ASSESSMENT
59M with multiple vascular risk factors including a fib on eliquis, last dose 10:30AM 4/15 and CAD s/p stents on ASA 81 mg p/w right monocular visual field deficit 2/2 to Right CRAO  Right monocular visual deficit 2/2 CRAO  -Neuro Q1H  -CTA showed right carotid calcifications, Carotid US pending.   -c/w ASA and Eliquis  -Stroke following  -Stroke core measure sent  -Opthalmology consulted, recommended ESR and CRP  -MR brain and orbits w/wo pending  -IR consulted pending plan for intra-arterial TPA  -Pain control w/ tylenol PRN

## 2025-04-16 NOTE — OCCUPATIONAL THERAPY INITIAL EVALUATION ADULT - ADDITIONAL COMMENTS
PTA pt reports living I an aprt with family + 6 [address]+ 1st floor set-up. Pt reports using a RW for indoor mobility and w/c for mobility outdoors. Pt also reports being independent with bathing and dressing + shower chair+ shower stool.

## 2025-04-16 NOTE — CHART NOTE - NSCHARTNOTEFT_GEN_A_CORE
Accepted by Dr. Robledo. Spoke with Resident Kristofer on 4/16 #[id number]    60M Hx afib on eliquis, CAD s/p stents on ASA, ESRD on dialysis with fistula, HTN. 4/15 found to have CRAO    Exam: AOx3, EOMI, PERRL, FC, R nasal visual field cut, RUE 5/5 except triceps 4/5, LUE 5/5, BLE 3/5 (w/ assistance for LLE 2/2 b/l hip replacement). Ambulates w/ walker and wheelchair.     - On ASA 81 and Eliquis 5mg BID  - MRI brain + orbitis w/wout pending  - carotid duplex pending  - LED pending Accepted by Dr. Robledo. Spoke with Resident Kristofer on 4/16 #[id number]. Transferring to bed 463W    60M Hx afib on eliquis, CAD s/p stents on ASA, ESRD on dialysis with fistula, HTN. 4/15 found to have CRAO    Exam: AOx3, EOMI, PERRL, FC, R nasal visual field cut, RUE 5/5 except triceps 4/5, LUE 5/5, BLE 3/5 (w/ assistance for LLE 2/2 b/l hip replacement). Ambulates w/ walker and wheelchair.     - On ASA 81 and Eliquis 5mg BID  - MRI brain + orbitis w/wout pending  - ophtho following  - nephro following, patient got HD on 4/15, need HD after MRI  - carotid duplex pending  - LED pending

## 2025-04-16 NOTE — SPEECH LANGUAGE PATHOLOGY EVALUATION - COMMENTS
59M with multiple vascular risk factors including a fib on eliquis, last dose 10:30AM 4/15 and CAD s/p stents on ASA 81 mg p/w right monocular visual field deficit. Vessels with severe athero at carotid bifurcations and intracranially as well. CTH neg. CTP with motion. Per Neuro: Impression: Right monocular visual disturbance 2/2 Right CRAO. Mechanism ESUS. Clock drawing and STEFAN deferred as Pt preferred to eat breakfast. In response to questioning, Pt with reduced insight to possible limitation of being able to drive given reduced vision at this time. [place] answers noted in response to problem solving questions with cues required to increase solutions. deferred as Pt preferred to eat breakfast. Speech language therapy post d/c; SLP to f/u on inpt basis 1-2 times per week to further probe writing skills and cognitive linguistic skills.

## 2025-04-16 NOTE — OCCUPATIONAL THERAPY INITIAL EVALUATION ADULT - PERTINENT HX OF CURRENT PROBLEM, REHAB EVAL
59y man with a PMHx significant for atrial fibrillation on eliquis 5 mg BID (last dose 4/15 at 10:30 am), HTN, CABG 2014, ESRD on T/Th/S dialysis 2/2 PKD, CAD s/p stents on ASA 81 mg presenting with acute onset right eye blurry vision after taking shower. LKW 1:45 PM 4/15. Denies HA, weakness, numbness, vision loss. Denies prior hx of stroke. Not a tenecteplase candidate given abnormal coags iso eliquis use. Not a thrombectomy candidate given no LVO.  Angio Neck Stroke Protocol:IMPRESSION: Small vessel white matter ischemic changes. No hemorrhage. Heterogeneous dense calvarium and cervical spine may be related to renal osteodystrophy. CTA of the head and neck demonstrates calcification of the carotid bifurcations bilaterally without significant stenosis as well as distal V4 segment calcifications. CT perfusion demonstrates a small amount of tissue with delayed mean transit time which may be artifactual due to motion.No core infarct is identified.

## 2025-04-17 ENCOUNTER — TRANSCRIPTION ENCOUNTER (OUTPATIENT)
Age: 60
End: 2025-04-17

## 2025-04-17 LAB
ANION GAP SERPL CALC-SCNC: 16 MMOL/L — SIGNIFICANT CHANGE UP (ref 5–17)
BUN SERPL-MCNC: 37 MG/DL — HIGH (ref 7–23)
CALCIUM SERPL-MCNC: 8.7 MG/DL — SIGNIFICANT CHANGE UP (ref 8.4–10.5)
CHLORIDE SERPL-SCNC: 95 MMOL/L — LOW (ref 96–108)
CO2 SERPL-SCNC: 24 MMOL/L — SIGNIFICANT CHANGE UP (ref 22–31)
CREAT SERPL-MCNC: 6.56 MG/DL — HIGH (ref 0.5–1.3)
CRP SERPL-MCNC: <3 MG/L — SIGNIFICANT CHANGE UP (ref 0–4)
EGFR: 9 ML/MIN/1.73M2 — LOW
EGFR: 9 ML/MIN/1.73M2 — LOW
GLUCOSE SERPL-MCNC: 86 MG/DL — SIGNIFICANT CHANGE UP (ref 70–99)
HCT VFR BLD CALC: 27.4 % — LOW (ref 39–50)
HGB BLD-MCNC: 9 G/DL — LOW (ref 13–17)
MAGNESIUM SERPL-MCNC: 2.1 MG/DL — SIGNIFICANT CHANGE UP (ref 1.6–2.6)
MCHC RBC-ENTMCNC: 31.9 PG — SIGNIFICANT CHANGE UP (ref 27–34)
MCHC RBC-ENTMCNC: 32.8 G/DL — SIGNIFICANT CHANGE UP (ref 32–36)
MCV RBC AUTO: 97.2 FL — SIGNIFICANT CHANGE UP (ref 80–100)
NRBC BLD AUTO-RTO: 0 /100 WBCS — SIGNIFICANT CHANGE UP (ref 0–0)
PHOSPHATE SERPL-MCNC: 6.2 MG/DL — HIGH (ref 2.5–4.5)
PLATELET # BLD AUTO: 83 K/UL — LOW (ref 150–400)
PLATELET RESPONSE ASPIRIN RESULT: 406 ARU — SIGNIFICANT CHANGE UP
POTASSIUM SERPL-MCNC: 5.1 MMOL/L — SIGNIFICANT CHANGE UP (ref 3.5–5.3)
POTASSIUM SERPL-SCNC: 5.1 MMOL/L — SIGNIFICANT CHANGE UP (ref 3.5–5.3)
RBC # BLD: 2.82 M/UL — LOW (ref 4.2–5.8)
RBC # FLD: 15.6 % — HIGH (ref 10.3–14.5)
SODIUM SERPL-SCNC: 135 MMOL/L — SIGNIFICANT CHANGE UP (ref 135–145)
WBC # BLD: 2.61 K/UL — LOW (ref 3.8–10.5)
WBC # FLD AUTO: 2.61 K/UL — LOW (ref 3.8–10.5)

## 2025-04-17 PROCEDURE — 99233 SBSQ HOSP IP/OBS HIGH 50: CPT | Mod: FS

## 2025-04-17 RX ADMIN — METOPROLOL SUCCINATE 12.5 MILLIGRAM(S): 50 TABLET, EXTENDED RELEASE ORAL at 05:36

## 2025-04-17 RX ADMIN — Medication 81 MILLIGRAM(S): at 12:46

## 2025-04-17 RX ADMIN — MUPIROCIN CALCIUM 1 APPLICATION(S): 20 CREAM TOPICAL at 05:37

## 2025-04-17 RX ADMIN — Medication 40 MILLIGRAM(S): at 05:36

## 2025-04-17 RX ADMIN — APIXABAN 5 MILLIGRAM(S): 2.5 TABLET, FILM COATED ORAL at 05:36

## 2025-04-17 RX ADMIN — MUPIROCIN CALCIUM 1 APPLICATION(S): 20 CREAM TOPICAL at 17:20

## 2025-04-17 RX ADMIN — APIXABAN 5 MILLIGRAM(S): 2.5 TABLET, FILM COATED ORAL at 17:18

## 2025-04-17 RX ADMIN — POLYETHYLENE GLYCOL 3350 17 GRAM(S): 17 POWDER, FOR SOLUTION ORAL at 07:13

## 2025-04-17 RX ADMIN — ATORVASTATIN CALCIUM 80 MILLIGRAM(S): 80 TABLET, FILM COATED ORAL at 21:11

## 2025-04-17 RX ADMIN — Medication 2 TABLET(S): at 21:11

## 2025-04-17 RX ADMIN — POLYETHYLENE GLYCOL 3350 17 GRAM(S): 17 POWDER, FOR SOLUTION ORAL at 17:18

## 2025-04-17 RX ADMIN — METOPROLOL SUCCINATE 12.5 MILLIGRAM(S): 50 TABLET, EXTENDED RELEASE ORAL at 17:18

## 2025-04-17 NOTE — DISCHARGE NOTE PROVIDER - CARE PROVIDER_API CALL
Chelsea Alcazar  NP in Family Health  [address], [address]  [place] [address]  Phone: [phone]  Fax: [phone]  Follow Up Time: 2 weeks    Simon Andersen  Nephrology  [address]  [place] [address]  Phone: [phone]  Fax: [phone]  Follow Up Time: 1-3 days   Chelsea Alcazar  NP in Family Health  [address], [address]  [place] [address]  Phone: [phone]  Fax: [phone]  Follow Up Time: 2 weeks    Simon Andersen  Nephrology  [address], [facility] [address]  [place] [address]  Phone: [phone]  Fax: [phone]  Follow Up Time: 1-3 days    Don Stone  Ophthalmology  [address], [address]  [place] [address]  Phone: [phone]  Fax: [phone]  Follow Up Time: 1 month

## 2025-04-17 NOTE — PROGRESS NOTE ADULT - SUBJECTIVE AND OBJECTIVE BOX
Overnight events noted      VITAL:  T(C): , Max: 37.1 (04-17-25 @ 00:23)  T(F): , Max: 98.8 (04-17-25 @ 00:23)  HR: 73 (04-17-25 @ 06:25)  BP: 118/79 (04-17-25 @ 06:25)  BP(mean): 76 (04-16-25 @ 11:05)  RR: 18 (04-17-25 @ 05:00)  SpO2: 98% (04-17-25 @ 05:00)  Wt(kg): --      PHYSICAL EXAM:  Constitutional: NAD, Alert  HEENT: NCAT, MMM  Neck: Supple, No JVD  Respiratory: CTA-b/l  Cardiovascular: RRR s1s2, no m/r/g  Gastrointestinal: BS+, soft, NT/ND  Extremities: No peripheral edema b/l  Neurological: no focal deficits; strength grossly intact  Back: no CVAT b/l  Skin: No rashes, no nevi      LABS:                        9.0    2.61  )-----------( 83       ( 17 Apr 2025 05:55 )             27.4     Na(135)/K(5.1)/Cl(95)/HCO3(24)/BUN(37)/Cr(6.56)Glu(86)/Ca(8.7)/Mg(2.1)/PO4(6.2)    04-17 @ 05:55  Na(136)/K(5.2)/Cl(99)/HCO3(20)/BUN(51)/Cr(7.60)Glu(49)/Ca(8.1)/Mg(--)/PO4(--)    04-15 @ 18:19  Na(136)/K(6.9)/Cl(100)/HCO3(21)/BUN(52)/Cr(7.53)Glu(109)/Ca(8.5)/Mg(--)/PO4(--)    04-15 @ 15:58      IMPRESSION: 59M w/ HTN, CAD-CABG, HCV, and [id number], 4/14/25 p/w acute right monocular visual field deficit 2/2 to Right CRAO    (1)Renal - ESRD - HD TTS - due for next HD today    (2)Hyperkalemia - mild/acceptable for now    (3)Ophtho/Neuro - acute CRAO - not a candidate for intraarterial TPA per Interventional Neurology. S/p MRI with gado yesterday. HD was ordered for performance directly after MRI with Gado. HD staff was informed that the MRI would not be taking place and therefore informed me/canceled the post-MRI HD. I see now that the MRI with gado did in fact take place yesterday.      RECOMMEND:  (1)HD 1st shift today  (2)Extra HD tomorrow  (3)Meds for GFR<10/HD             Simon Andersen MD  [facility]  Office/on call physician: [phone]  Cell (7a-7p): [phone]       seen on HD  (+)blurry vision on right      VITAL:  T(C): , Max: 37.1 (04-17-25 @ 00:23)  T(F): , Max: 98.8 (04-17-25 @ 00:23)  HR: 73 (04-17-25 @ 06:25)  BP: 118/79 (04-17-25 @ 06:25)  BP(mean): 76 (04-16-25 @ 11:05)  RR: 18 (04-17-25 @ 05:00)  SpO2: 98% (04-17-25 @ 05:00)  Wt(kg): --      PHYSICAL EXAM:  Constitutional: NAD, Alert  HEENT: NCAT, MMM  Neck: Supple, No JVD  Respiratory: CTA-b/l  Cardiovascular: RRR s1s2, no m/r/g  Gastrointestinal: BS+, soft, NT/ND  Extremities: No peripheral edema b/l  Neurological: no focal deficits; strength grossly intact  Back: no CVAT b/l  Skin: No rashes, no nevi  AVF - accessed    LABS:                        9.0    2.61  )-----------( 83       ( 17 Apr 2025 05:55 )             27.4     Na(135)/K(5.1)/Cl(95)/HCO3(24)/BUN(37)/Cr(6.56)Glu(86)/Ca(8.7)/Mg(2.1)/PO4(6.2)    04-17 @ 05:55  Na(136)/K(5.2)/Cl(99)/HCO3(20)/BUN(51)/Cr(7.60)Glu(49)/Ca(8.1)/Mg(--)/PO4(--)    04-15 @ 18:19  Na(136)/K(6.9)/Cl(100)/HCO3(21)/BUN(52)/Cr(7.53)Glu(109)/Ca(8.5)/Mg(--)/PO4(--)    04-15 @ 15:58      IMPRESSION: 59M w/ HTN, CAD-CABG, HCV, and [id number], 4/14/25 p/w acute right monocular visual field deficit 2/2 to Right CRAO    (1)Renal - ESRD - HD TTS - on HD now    (2)Hyperkalemia - mild/acceptable for now    (3)Ophtho/Neuro - acute CRAO - not a candidate for intraarterial TPA per Interventional Neurology. S/p MRI with gado yesterday. HD was ordered for performance directly after MRI with Gado. HD staff was informed that the MRI would not be taking place and therefore informed me/canceled the post-MRI HD. I see now that the MRI with gado did in fact take place yesterday.      RECOMMEND:  (1)HD 1st shift today  (2)Extra HD tomorrow  (3)Meds for GFR<10/HD             Simon Andersen MD  [facility]  Office/on call physician: [phone]  Cell (7a-7p): [phone]

## 2025-04-17 NOTE — PROGRESS NOTE ADULT - NS ATTEND AMEND GEN_ALL_CORE FT
I reviewed available diagnostic studies, and reviewed images personally. I agree with resident & fellow's history, exam, orders placed, and plan of care. Total care time spent, 50 min. This excludes any time spent on separate procedures or teaching. Medical issues needing to be addressed include: Central retinal artery occlusion of R eye, monocular vision deficit, ESRD on HD,  cerebral athero, Afib on Eliquis. Partial vision loss. MRI/MR orbits unremarkable. A1C 4.4, LDL 59. LLE exam with decreased strength but is very pain limited. Cont asa Eliquis at this time and atorvastatin 80mg. Penidn HD tomorrow.

## 2025-04-17 NOTE — PROGRESS NOTE ADULT - ASSESSMENT
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 Last dose of apixaban at 10:30 AM 4/15/25.    Pre-mRS: 3.  LKN: 13:45 PM 4/15/25.  NIHSS: 0 (NIHSS does not account for monocular vision loss).    Impression: Partial vision loss primarily sparing superotemporal visual field of OD due to OD cilioretinal artery occlusion etiology cardioembolic, known A. Fib    NEURO: Continue close monitoring for neurologic deterioration, permissive HTN, titrate statin to LDL goal less than 70, MRI Brain w/o, MRA Head w/o and Neck w/contrast. Physical therapy/OT/Speech eval/treatment.     ANTITHROMBOTIC THERAPY: Eliquis and ASA    PULMONARY: CXR clear, protecting airway, saturating well     CARDIOVASCULAR: check TTE, cardiac monitoring                              SBP goal:     GASTROINTESTINAL:  dysphagia screen       Diet:     RENAL: BUN/Cr stable, good urine output      Na Goal: Greater than 135     Brooks:    HEMATOLOGY: H/H stable, Platelets normal      DVT ppx: Eliquis    ID: afebrile, no leukocytosis     OTHER:     DISPOSITION: Rehab or home depending on PT eval once stable and workup is complete      CORE MEASURES:        Admission NIHSS: 0     Tenecteplase [] YES [x] NO      LDL/HDL:     Depression Screen:      Statin Therapy:     Dysphagia Screen: [x] PASS [] FAIL     Smoking [] YES [x] NO      Afib [x] YES [] NO     Stroke Education [x] YES [] NO 59-year-old male w/ PMHx HTN, CAD (s/p stents on ASA 81 mg QD), CABG (2014), AFib (on apixaban 5 mg BID), 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 Last dose of apixaban at 10:30 AM 4/15/25.    Pre-mRS: 3.  LKN: 13:45 PM 4/15/25.  NIHSS: 0 (NIHSS does not account for monocular vision loss).    Impression: Partial vision loss primarily sparing superotemporal visual field of OD due to OD cilioretinal artery occlusion etiology cardioembolic, known A. Fib    NEURO: Continue close monitoring for neurologic deterioration, permissive HTN, titrate statin to LDL goal less than 70, MRI Brain w/o noted above, Carotid duplex: Mild to moderate bilateral calcified atherosclerotic disease. No significant hemodynamic stenosis of the left carotid artery. Less than 50% stenosis proximal right internal carotid artery Physical therapy/OT pending, but plan for home as patient at his baseline.     ANTITHROMBOTIC THERAPY: Eliquis and ASA    PULMONARY:  protecting airway, saturating well     CARDIOVASCULAR: TTE: Left ventricular cavity is normal in size. Left ventricular wall thickness is normal. Left ventricular systolic function is mildly decreased. Regional wall motion abnormalities present. Agitated saline injection was negative for intracardiac shunt., cardiac monitoring                              SBP goal: <180    GASTROINTESTINAL:  dysphagia screen- passed       Diet: Regular    RENAL: [id number], T-TH-S. Nephro consult appreciated, plan for extra HD Friday morning and then resume Saturday     Na Goal: Greater than 135     Brooks: N    HEMATOLOGY: H/H stable, Platelets normal, VA Duplex LE: No evidence of deep venous thrombosis in either lower extremity.     DVT ppx: Eliquis    ID: afebrile, no leukocytosis, +MSSA: started on Bactroban nasal ointment for 5 days.     OTHER: plan of care discussed with patient and daughter at bedside    DISPOSITION: Home 04/18 after HD in the morning      CORE MEASURES:        Admission NIHSS: 0     Tenecteplase [] YES [x] NO      LDL/HDL: 59/32     Depression Screen: 0     Statin Therapy: Y     Dysphagia Screen: [x] PASS [] FAIL     Smoking [] YES [x] NO      Afib [x] YES [] NO     Stroke Education [x] YES [] NO

## 2025-04-17 NOTE — DISCHARGE NOTE PROVIDER - NSDCCPCAREPLAN_GEN_ALL_CORE_FT
PRINCIPAL DISCHARGE DIAGNOSIS  Diagnosis: CRAO (central retinal artery occlusion), right  Assessment and Plan of Treatment: Please follow up with neurologist in 1-2 weeks after discharge. The office will call you to schedule an appointment, if you do not hear from them please call [phone]. Continue taking medications as prescribed. If you were prescribed a statin for your cholesterol please make sure you have your liver enzymes checked with your primary care physician. Monitor your blood pressure. Reduce fat, cholesterol and salt in your diet. Increase intake of fruits and vegetables. Limit alcohol to minimum and do not smoke. You may be at risk for falling, make changes to your home to help you walk easier. Keep up to date on vaccinations.  If you experience any symptoms of facial drooping, slurred speech, arm or leg weakness, severe headache, vision changes or any worsening symptoms, notify provider immediately and return to ER.      SECONDARY DISCHARGE DIAGNOSES  Diagnosis: ESRD on dialysis  Assessment and Plan of Treatment: Please follow up with your nephrologist to monitor your kidney function, continue with low protein diet, and continue routine hemodialysis.

## 2025-04-17 NOTE — DISCHARGE NOTE PROVIDER - NSDCMRMEDTOKEN_GEN_ALL_CORE_FT
aspirin 81 mg oral delayed release tablet: 1 tab(s) orally once a day  Auryxia 210 mg oral tablet: 2 tab(s) orally 2 times a day  Eliquis 5 mg oral tablet: 1 tab(s) orally 2 times a day  isosorbide mononitrate 30 mg oral tablet, extended release: 1 tab(s) orally once a day  metoprolol succinate 25 mg oral tablet, extended release: 1 tab(s) orally once a day  omeprazole 40 mg oral delayed release capsule: 1 cap(s) orally once a day  simvastatin 20 mg oral tablet: 1 tab(s) orally once a day   acetaminophen 325 mg oral tablet: 2 tab(s) orally every 6 hours As needed Temp greater or equal to 38C (100.4F), Mild Pain (1 - 3)  aspirin 81 mg oral delayed release tablet: 1 tab(s) orally once a day  Auryxia 210 mg oral tablet: 2 tab(s) orally 2 times a day  Eliquis 5 mg oral tablet: 1 tab(s) orally 2 times a day  isosorbide mononitrate 30 mg oral tablet, extended release: 1 tab(s) orally once a day  metoprolol succinate 25 mg oral tablet, extended release: 1 tab(s) orally once a day  omeprazole 40 mg oral delayed release capsule: 1 cap(s) orally once a day  polyethylene glycol 3350 oral powder for reconstitution: 17 gram(s) orally 2 times a day  senna leaf extract oral tablet: 2 tab(s) orally once a day (at bedtime)  simvastatin 20 mg oral tablet: 1 tab(s) orally once a day   acetaminophen 325 mg oral tablet: 2 tab(s) orally every 6 hours As needed Temp greater or equal to 38C (100.4F), Mild Pain (1 - 3)  aspirin 81 mg oral delayed release tablet: 1 tab(s) orally once a day  Auryxia 210 mg oral tablet: 2 tab(s) orally 2 times a day  Eliquis 5 mg oral tablet: 1 tab(s) orally 2 times a day  isosorbide mononitrate 30 mg oral tablet, extended release: 1 tab(s) orally once a day  metoprolol succinate 25 mg oral tablet, extended release: 1 tab(s) orally once a day  omeprazole 40 mg oral delayed release capsule: 1 cap(s) orally once a day  Outpatient occupational Therapy : · Therapy Frequency  1-2x/week  · Predicted Duration of Therapy Intervention (days/wks)  3 weeks  · Occupational Therapy Discharge Recommendations  follow-up with neuro ophthalmologist. Pt would benefit from home OT to ensure independence and safety with all ADLs/mobility in home environment.     General Therapy Interventions:   · Planned Therapy Interventions- transfer training; bed mobility training; balance training  · Balance Training  GOAL: Pt will demonstrate improved  dynamic standing balance by one grade while completing grooming task at sink independently in 3 weeks.  · Bed Mobility Training  GOAL: Pt will be independent with all aspects of bed mobility in 3 weeks  Outpatient physical therapy: · Therapy Frequency- 2-3x/week  · Predicted Duration of Therapy Intervention (days/wks)  4wks  · Physical Therapy Discharge Recommendations      General Interventions:   · Planned Therapy Interventions  balance training; gait training; strengthening  · Gait Training  GOAL: (to be met in 4wks) ambulate 100ft independently with appropriate assistive device  · Balance Training  GOAL: (to be met in 4wks) increased standing balance to good to decreased risk of falls  · Strengthening  GOAL: (to be met in 4 wks) increased BUE/BLE strength to 5/5 to decrease assistance with functional activities  polyethylene glycol 3350 oral powder for reconstitution: 17 gram(s) orally 2 times a day  senna leaf extract oral tablet: 2 tab(s) orally once a day (at bedtime)  simvastatin 20 mg oral tablet: 1 tab(s) orally once a day

## 2025-04-17 NOTE — PROGRESS NOTE ADULT - SUBJECTIVE AND OBJECTIVE BOX
THE PATIENT WAS SEEN AND EXAMINED BY ME WITH THE HOUSESTAFF AND STROKE TEAM DURING MORNING ROUNDS.   HPI:  EDGAR CLAUDIO is a 59y (1965) man with a PMHx significant for atrial fibrillation on eliquis 5 mg bid, HTN, CABG 2014, ESRD on T/Th/S dialysis 2/2 PKD, CAD s/p stents on ASA 81 mg presenting with acute onset right eye blurry vision after taking shower. LKW 1:45 PM 4/15. Denies HA, weakness, numbness, vision loss. Denies prior hx of stroke.     Last eliquis dose 10:30 AM 4/15.     NIHSS:2  preMRS:3, walks with walker independently due to b/l hip replacements     Not a tenecteplase candidate given abnormal coags iso eliquis use  Not a thrombectomy candidate given no LVO       SUBJECTIVE: No events overnight.  No new neurologic complaints.      acetaminophen     Tablet .. 650 milliGRAM(s) Oral every 6 hours PRN  apixaban 5 milliGRAM(s) Oral two times a day  aspirin  chewable 81 milliGRAM(s) Oral daily  atorvastatin 80 milliGRAM(s) Oral at bedtime  metoprolol tartrate 12.5 milliGRAM(s) Oral every 12 hours  mupirocin 2% Nasal 1 Application(s) Both Nostrils two times a day  pantoprazole    Tablet 40 milliGRAM(s) Oral before breakfast  polyethylene glycol 3350 17 Gram(s) Oral two times a day  senna 2 Tablet(s) Oral at bedtime      PHYSICAL EXAM:   Vital Signs Last 24 Hrs  T(C): 36.7 (17 Apr 2025 05:00), Max: 37.1 (17 Apr 2025 00:23)  T(F): 98.1 (17 Apr 2025 05:00), Max: 98.8 (17 Apr 2025 00:23)  HR: 73 (17 Apr 2025 06:25) (68 - 91)  BP: 118/79 (17 Apr 2025 06:25) (97/56 - 122/80)  BP(mean): 76 (16 Apr 2025 11:05) (72 - 88)  RR: 18 (17 Apr 2025 05:00) (18 - 24)  SpO2: 98% (17 Apr 2025 05:00) (93% - 100%)    Parameters below as of 17 Apr 2025 05:00  Patient On (Oxygen Delivery Method): room air        General: No acute distress  HEENT: EOM intact, RHH  Abdomen: Soft, nontender, nondistended   Extremities: No edema    NEUROLOGICAL EXAM:  Mental status: Awake, alert, oriented x3, no aphasia, no neglect, normal memory   Cranial Nerves: No facial asymmetry, no nystagmus, no dysarthria,  tongue midline  Motor exam: Normal tone, no drift, 5/5 RUE, 3/5 RLE, 5/5 LUE, 0/5 LLE, normal fine finger movements.  Sensation: Intact to light touch   Coordination/ Gait: No dysmetria, PATTY intact and symmetric bilaterally    LABS:                        9.0    2.61  )-----------( 83       ( 17 Apr 2025 05:55 )             27.4    04-17    135  |  95[L]  |  37[H]  ----------------------------<  86  5.1   |  24  |  6.56[H]    Ca    8.7      17 Apr 2025 05:55  Phos  6.2     04-17  Mg     2.1     04-17    TPro  7.3  /  Alb  4.0  /  TBili  0.6  /  DBili  x   /  AST  9[L]  /  ALT  7[L]  /  AlkPhos  202[H]  04-15  PT/INR - ( 15 Apr 2025 15:58 )   PT: 17.2 sec;   INR: 1.50 ratio         PTT - ( 15 Apr 2025 15:58 )  PTT:41.7 sec      IMAGING: Reviewed by me.     04/16/25: MRI HEAD/MR Orbits No acute cerebral cortical   infarct is found.   No intracranial hemorrhage is recognized.  No mass   effect is found in the brain.   Empty sella syndrome is noted. Prominent optic nerve root sheaths with kinking.    04/15/25 CT HEAD/CTA : Small vessel white matter ischemic changes. No hemorrhage.   Heterogeneous dense calvarium and cervical spine may be related to renal   osteodystrophy. CTA of the head and neck demonstrates calcification of   the carotid bifurcations bilaterally without significant stenosis as well   as distal V4 segment calcifications.    THE PATIENT WAS SEEN AND EXAMINED BY ME WITH THE HOUSESTAFF AND STROKE TEAM DURING MORNING ROUNDS.   HPI:  EDGAR CLAUDIO is a 59y (1965) man with a PMHx significant for atrial fibrillation on eliquis 5 mg bid, HTN, CABG 2014, ESRD on T/Th/S dialysis 2/2 PKD, CAD s/p stents on ASA 81 mg presenting with acute onset right eye blurry vision after taking shower. LKW 1:45 PM 4/15. Denies HA, weakness, numbness, vision loss. Denies prior hx of stroke.     Last eliquis dose 10:30 AM 4/15.     NIHSS:2  preMRS:3, walks with walker independently due to b/l hip replacements     Not a tenecteplase candidate given abnormal coags iso eliquis use  Not a thrombectomy candidate given no LVO       SUBJECTIVE: No events overnight.  No new neurologic complaints.      acetaminophen     Tablet .. 650 milliGRAM(s) Oral every 6 hours PRN  apixaban 5 milliGRAM(s) Oral two times a day  aspirin  chewable 81 milliGRAM(s) Oral daily  atorvastatin 80 milliGRAM(s) Oral at bedtime  metoprolol tartrate 12.5 milliGRAM(s) Oral every 12 hours  mupirocin 2% Nasal 1 Application(s) Both Nostrils two times a day  pantoprazole    Tablet 40 milliGRAM(s) Oral before breakfast  polyethylene glycol 3350 17 Gram(s) Oral two times a day  senna 2 Tablet(s) Oral at bedtime      PHYSICAL EXAM:   Vital Signs Last 24 Hrs  T(C): 36.7 (17 Apr 2025 05:00), Max: 37.1 (17 Apr 2025 00:23)  T(F): 98.1 (17 Apr 2025 05:00), Max: 98.8 (17 Apr 2025 00:23)  HR: 73 (17 Apr 2025 06:25) (68 - 91)  BP: 118/79 (17 Apr 2025 06:25) (97/56 - 122/80)  BP(mean): 76 (16 Apr 2025 11:05) (72 - 88)  RR: 18 (17 Apr 2025 05:00) (18 - 24)  SpO2: 98% (17 Apr 2025 05:00) (93% - 100%)    Parameters below as of 17 Apr 2025 05:00  Patient On (Oxygen Delivery Method): room air        General: No acute distress  HEENT: EOM intact, RHH  Abdomen: Soft, nontender, nondistended   Extremities: No edema    NEUROLOGICAL EXAM:  Mental status: Awake, alert, oriented x3, no aphasia, no neglect, normal memory   Cranial Nerves: No facial asymmetry, no nystagmus, no dysarthria,  tongue midline  Motor exam: Normal tone, no drift, 4-/5 RUE ( chronic), RLE moves sponantenously, 5/5 LUE, LLE movement limited due to chronic pain, normal fine finger movements.  Sensation: Intact to light touch   Coordination/ Gait: No dysmetria, PATTY intact and symmetric bilaterally    LABS:                        9.0    2.61  )-----------( 83       ( 17 Apr 2025 05:55 )             27.4    04-17    135  |  95[L]  |  37[H]  ----------------------------<  86  5.1   |  24  |  6.56[H]    Ca    8.7      17 Apr 2025 05:55  Phos  6.2     04-17  Mg     2.1     04-17    TPro  7.3  /  Alb  4.0  /  TBili  0.6  /  DBili  x   /  AST  9[L]  /  ALT  7[L]  /  AlkPhos  202[H]  04-15  PT/INR - ( 15 Apr 2025 15:58 )   PT: 17.2 sec;   INR: 1.50 ratio         PTT - ( 15 Apr 2025 15:58 )  PTT:41.7 sec      IMAGING: Reviewed by me.     04/16/25: MRI HEAD/MR Orbits No acute cerebral cortical   infarct is found.   No intracranial hemorrhage is recognized.  No mass   effect is found in the brain.   Empty sella syndrome is noted. Prominent optic nerve root sheaths with kinking.    04/15/25 CT HEAD/CTA : Small vessel white matter ischemic changes. No hemorrhage.   Heterogeneous dense calvarium and cervical spine may be related to renal   osteodystrophy. CTA of the head and neck demonstrates calcification of   the carotid bifurcations bilaterally without significant stenosis as well   as distal V4 segment calcifications.

## 2025-04-17 NOTE — DISCHARGE NOTE PROVIDER - PROVIDER TOKENS
PROVIDER:[TOKEN:[[id number]],FOLLOWUP:[2 weeks]],PROVIDER:[TOKEN:[[id number]],FOLLOWUP:[1-3 days]] PROVIDER:[TOKEN:[[id number]],FOLLOWUP:[2 weeks]],PROVIDER:[TOKEN:[[id number]],FOLLOWUP:[1-3 days]],PROVIDER:[TOKEN:[[id number]],FOLLOWUP:[1 month]]

## 2025-04-17 NOTE — DISCHARGE NOTE PROVIDER - NSDCFUSCHEDAPPT_GEN_ALL_CORE_FT
Gloria Lechuga  [facility]  ELECTROPH [address]  Scheduled Appointment: 04/21/2025

## 2025-04-17 NOTE — DISCHARGE NOTE PROVIDER - CARE PROVIDERS DIRECT ADDRESSES
,[email or web address]

## 2025-04-17 NOTE — DISCHARGE NOTE PROVIDER - HOSPITAL COURSE
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 Last dose of apixaban at 10:30 AM 4/15/25.    Pre-mRS: 3.  LKN: 13:45 PM 4/15/25.  NIHSS: 0 (NIHSS does not account for monocular vision loss).    Impression: Partial vision loss primarily sparing superotemporal visual field of OD due to OD cilioretinal artery occlusion etiology cardioembolic, known A. Fib      04/16/25: MRI HEAD/MR Orbits No acute cerebral cortical   infarct is found.   No intracranial hemorrhage is recognized.  No mass   effect is found in the brain.   Empty sella syndrome is noted. Prominent optic nerve root sheaths with kinking.    04/15/25 CT HEAD/CTA : Small vessel white matter ischemic changes. No hemorrhage.   Heterogeneous dense calvarium and cervical spine may be related to renal   osteodystrophy. CTA of the head and neck demonstrates calcification of   the carotid bifurcations bilaterally without significant stenosis as well   as distal V4 segment calcifications.     Continued on home dose eliquis and aspirin. for secondary stroke prevention in setting of afib, and Aspirin for CAD.     TTE: Left ventricular cavity is normal in size. Left ventricular wall thickness is normal. Left ventricular systolic function is mildly decreased. Regional wall motion abnormalities present. Agitated saline injection was negative for intracardiac shunt.  Outpatient cardiology appointment 4/21.     ESRD-HD, T-TH-S. Nephro consult appreciated, plan for extra HD 4/18 AM then resume 4/19 AM     LDL 59- continue on home statin, A1C 4.4    Mild thrombocytopenia- seems chronic, follow up with PCP.     PT/OT recommended home with home care. Stable for discharge.

## 2025-04-18 ENCOUNTER — TRANSCRIPTION ENCOUNTER (OUTPATIENT)
Age: 60
End: 2025-04-18

## 2025-04-18 VITALS
RESPIRATION RATE: 18 BRPM | SYSTOLIC BLOOD PRESSURE: 101 MMHG | TEMPERATURE: 98 F | DIASTOLIC BLOOD PRESSURE: 65 MMHG | OXYGEN SATURATION: 100 % | HEART RATE: 75 BPM

## 2025-04-18 PROCEDURE — 84480 ASSAY TRIIODOTHYRONINE (T3): CPT

## 2025-04-18 PROCEDURE — 70543 MRI ORBT/FAC/NCK W/O &W/DYE: CPT | Mod: MC

## 2025-04-18 PROCEDURE — 96374 THER/PROPH/DIAG INJ IV PUSH: CPT

## 2025-04-18 PROCEDURE — 86901 BLOOD TYPING SEROLOGIC RH(D): CPT

## 2025-04-18 PROCEDURE — 82962 GLUCOSE BLOOD TEST: CPT

## 2025-04-18 PROCEDURE — 84484 ASSAY OF TROPONIN QUANT: CPT

## 2025-04-18 PROCEDURE — 70498 CT ANGIOGRAPHY NECK: CPT | Mod: MC

## 2025-04-18 PROCEDURE — 92523 SPEECH SOUND LANG COMPREHEN: CPT

## 2025-04-18 PROCEDURE — 82435 ASSAY OF BLOOD CHLORIDE: CPT

## 2025-04-18 PROCEDURE — 86850 RBC ANTIBODY SCREEN: CPT

## 2025-04-18 PROCEDURE — 86900 BLOOD TYPING SEROLOGIC ABO: CPT

## 2025-04-18 PROCEDURE — 84439 ASSAY OF FREE THYROXINE: CPT

## 2025-04-18 PROCEDURE — 70553 MRI BRAIN STEM W/O & W/DYE: CPT | Mod: MC

## 2025-04-18 PROCEDURE — 80061 LIPID PANEL: CPT

## 2025-04-18 PROCEDURE — 80053 COMPREHEN METABOLIC PANEL: CPT

## 2025-04-18 PROCEDURE — 84295 ASSAY OF SERUM SODIUM: CPT

## 2025-04-18 PROCEDURE — 85610 PROTHROMBIN TIME: CPT

## 2025-04-18 PROCEDURE — 93880 EXTRACRANIAL BILAT STUDY: CPT

## 2025-04-18 PROCEDURE — 85025 COMPLETE CBC W/AUTO DIFF WBC: CPT

## 2025-04-18 PROCEDURE — 83605 ASSAY OF LACTIC ACID: CPT

## 2025-04-18 PROCEDURE — 70450 CT HEAD/BRAIN W/O DYE: CPT | Mod: MC

## 2025-04-18 PROCEDURE — 97161 PT EVAL LOW COMPLEX 20 MIN: CPT

## 2025-04-18 PROCEDURE — 87641 MR-STAPH DNA AMP PROBE: CPT

## 2025-04-18 PROCEDURE — 83036 HEMOGLOBIN GLYCOSYLATED A1C: CPT

## 2025-04-18 PROCEDURE — A9585: CPT

## 2025-04-18 PROCEDURE — 82330 ASSAY OF CALCIUM: CPT

## 2025-04-18 PROCEDURE — 84436 ASSAY OF TOTAL THYROXINE: CPT

## 2025-04-18 PROCEDURE — 84100 ASSAY OF PHOSPHORUS: CPT

## 2025-04-18 PROCEDURE — 85014 HEMATOCRIT: CPT

## 2025-04-18 PROCEDURE — 93005 ELECTROCARDIOGRAM TRACING: CPT

## 2025-04-18 PROCEDURE — 85018 HEMOGLOBIN: CPT

## 2025-04-18 PROCEDURE — 94640 AIRWAY INHALATION TREATMENT: CPT

## 2025-04-18 PROCEDURE — 83735 ASSAY OF MAGNESIUM: CPT

## 2025-04-18 PROCEDURE — 0042T: CPT | Mod: MC

## 2025-04-18 PROCEDURE — 85027 COMPLETE CBC AUTOMATED: CPT

## 2025-04-18 PROCEDURE — 99291 CRITICAL CARE FIRST HOUR: CPT

## 2025-04-18 PROCEDURE — 97165 OT EVAL LOW COMPLEX 30 MIN: CPT

## 2025-04-18 PROCEDURE — 70496 CT ANGIOGRAPHY HEAD: CPT | Mod: MC

## 2025-04-18 PROCEDURE — 93970 EXTREMITY STUDY: CPT

## 2025-04-18 PROCEDURE — C8929: CPT

## 2025-04-18 PROCEDURE — 82803 BLOOD GASES ANY COMBINATION: CPT

## 2025-04-18 PROCEDURE — 85576 BLOOD PLATELET AGGREGATION: CPT

## 2025-04-18 PROCEDURE — 86140 C-REACTIVE PROTEIN: CPT

## 2025-04-18 PROCEDURE — 84132 ASSAY OF SERUM POTASSIUM: CPT

## 2025-04-18 PROCEDURE — 82947 ASSAY GLUCOSE BLOOD QUANT: CPT

## 2025-04-18 PROCEDURE — 87640 STAPH A DNA AMP PROBE: CPT

## 2025-04-18 PROCEDURE — 84443 ASSAY THYROID STIM HORMONE: CPT

## 2025-04-18 PROCEDURE — 99233 SBSQ HOSP IP/OBS HIGH 50: CPT | Mod: FS

## 2025-04-18 PROCEDURE — 85730 THROMBOPLASTIN TIME PARTIAL: CPT

## 2025-04-18 PROCEDURE — 85652 RBC SED RATE AUTOMATED: CPT

## 2025-04-18 PROCEDURE — 99261: CPT

## 2025-04-18 PROCEDURE — 80048 BASIC METABOLIC PNL TOTAL CA: CPT

## 2025-04-18 RX ORDER — POLYETHYLENE GLYCOL 3350 17 G/17G
17 POWDER, FOR SOLUTION ORAL
Qty: 0 | Refills: 0 | DISCHARGE
Start: 2025-04-18

## 2025-04-18 RX ORDER — ACETAMINOPHEN 500 MG/5ML
2 LIQUID (ML) ORAL
Qty: 0 | Refills: 0 | DISCHARGE
Start: 2025-04-18

## 2025-04-18 RX ORDER — SENNA 187 MG
2 TABLET ORAL
Qty: 0 | Refills: 0 | DISCHARGE
Start: 2025-04-18

## 2025-04-18 RX ADMIN — APIXABAN 5 MILLIGRAM(S): 2.5 TABLET, FILM COATED ORAL at 05:36

## 2025-04-18 RX ADMIN — MUPIROCIN CALCIUM 1 APPLICATION(S): 20 CREAM TOPICAL at 05:36

## 2025-04-18 RX ADMIN — Medication 40 MILLIGRAM(S): at 06:45

## 2025-04-18 NOTE — PROGRESS NOTE ADULT - REASON FOR ADMISSION
MAUREEN
Right eye vision loss
MAUREEN
Right eye vision loss
Right eye vision loss
MAUREEN
MAUREEN
Right eye vision loss
Right eye vision loss

## 2025-04-18 NOTE — PROGRESS NOTE ADULT - SUBJECTIVE AND OBJECTIVE BOX
Seen on HD      VITAL:  T(C): , Max: 36.8 (04-17-25 @ 14:42)  T(F): , Max: 98.3 (04-17-25 @ 14:42)  HR: 69 (04-18-25 @ 07:48)  BP: 129/71 (04-18-25 @ 07:48)  RR: 18 (04-18-25 @ 07:48)  SpO2: 98% (04-18-25 @ 07:48)  Wt(kg): --      PHYSICAL EXAM:  Constitutional: NAD, Alert  HEENT: NCAT, MMM  Neck: Supple, No JVD  Respiratory: CTA-b/l  Cardiovascular: RRR s1s2, no m/r/g  Gastrointestinal: BS+, soft, NT/ND  Extremities: No peripheral edema b/l  Neurological: no focal deficits; strength grossly intact  Back: no CVAT b/l  Skin: No rashes, no nevi  AVF - accessed    LABS:                        9.0    2.61  )-----------( 83       ( 17 Apr 2025 05:55 )             27.4     Na(135)/K(5.1)/Cl(95)/HCO3(24)/BUN(37)/Cr(6.56)Glu(86)/Ca(8.7)/Mg(2.1)/PO4(6.2)    04-17 @ 05:55  Na(136)/K(5.2)/Cl(99)/HCO3(20)/BUN(51)/Cr(7.60)Glu(49)/Ca(8.1)/Mg(--)/PO4(--)    04-15 @ 18:19  Na(136)/K(6.9)/Cl(100)/HCO3(21)/BUN(52)/Cr(7.53)Glu(109)/Ca(8.5)/Mg(--)/PO4(--)    04-15 @ 15:58        IMPRESSION: 59M w/ HTN, CAD-CABG, HCV, and [id number]-OA, 4/14/25 p/w acute right monocular visual field deficit 2/2 to Right CRAO    (1)Renal - ESRD - HD TTS - on HD now (extra treatment s/p MRI with gado) - due for HD tomorrow     (2)Hyperkalemia - mild/acceptable for now    (3)Ophtho/Neuro - acute CRAO - not a candidate for intraarterial TPA per Interventional Neurology. S/p MRI with gado yesterday. HD was ordered for performance directly after MRI with Gado. HD staff was informed that the MRI would not be taking place and therefore informed me/canceled the post-MRI HD. I see now that the MRI with gado did in fact take place yesterday.      RECOMMEND:  (1)HD today as ordered  (2)No objection to discharge after HD today if no further complications ensue; plan for HD tomorrow at [facility] as scheduled              Simon Andersen MD  [facility]  Office/on call physician: [phone]  Cell (7a-7p): [phone]       Seen on HD - well-tolerating    VITAL:  T(C): , Max: 36.8 (04-17-25 @ 14:42)  T(F): , Max: 98.3 (04-17-25 @ 14:42)  HR: 69 (04-18-25 @ 07:48)  BP: 129/71 (04-18-25 @ 07:48)  RR: 18 (04-18-25 @ 07:48)  SpO2: 98% (04-18-25 @ 07:48)  Wt(kg): --      PHYSICAL EXAM:  Constitutional: NAD, Alert  HEENT: NCAT, MMM  Neck: Supple, No JVD  Respiratory: CTA-b/l  Cardiovascular: RRR s1s2, no m/r/g  Gastrointestinal: BS+, soft, NT/ND  Extremities: No peripheral edema b/l  Neurological: no focal deficits; strength grossly intact  Back: no CVAT b/l  Skin: No rashes, no nevi  AVF - accessed    LABS:                        9.0    2.61  )-----------( 83       ( 17 Apr 2025 05:55 )             27.4     Na(135)/K(5.1)/Cl(95)/HCO3(24)/BUN(37)/Cr(6.56)Glu(86)/Ca(8.7)/Mg(2.1)/PO4(6.2)    04-17 @ 05:55  Na(136)/K(5.2)/Cl(99)/HCO3(20)/BUN(51)/Cr(7.60)Glu(49)/Ca(8.1)/Mg(--)/PO4(--)    04-15 @ 18:19  Na(136)/K(6.9)/Cl(100)/HCO3(21)/BUN(52)/Cr(7.53)Glu(109)/Ca(8.5)/Mg(--)/PO4(--)    04-15 @ 15:58        IMPRESSION: 59M w/ HTN, CAD-CABG, HCV, and [id number], 4/14/25 p/w acute right monocular visual field deficit 2/2 to Right CRAO    (1)Renal - ESRD - HD TTS - on HD now (extra treatment s/p MRI with gado) - due for HD tomorrow     (2)Hyperkalemia - mild/acceptable for now    (3)Ophtho/Neuro - acute CRAO - not a candidate for intraarterial TPA per Interventional Neurology. S/p MRI with gado yesterday. HD was ordered for performance directly after MRI with Gado. HD staff was informed that the MRI would not be taking place and therefore informed me/canceled the post-MRI HD. I see now that the MRI with gado did in fact take place yesterday.      RECOMMEND:  (1)HD today as ordered  (2)No objection to discharge after HD today if no further complications ensue; plan for HD tomorrow at [facility] as scheduled              Simon Andersen MD  [facility]  Office/on call physician: [phone]  Cell (7a-7p): [phone]

## 2025-04-18 NOTE — DISCHARGE NOTE NURSING/CASE MANAGEMENT/SOCIAL WORK - FINANCIAL ASSISTANCE
[facility] provides services at a reduced cost to those who are determined to be eligible through [facility]’s financial assistance program. Information regarding [facility]’s financial assistance program can be found by going to [email or web address] or by calling [phone].

## 2025-04-18 NOTE — PROGRESS NOTE ADULT - ASSESSMENT
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 Last dose of apixaban at 10:30 AM 4/15/25.    Pre-mRS: 3.  LKN: 13:45 PM 4/15/25.  NIHSS: 0 (NIHSS does not account for monocular vision loss).    Impression: Partial vision loss primarily sparing superotemporal visual field of OD due to OD cilioretinal artery occlusion etiology cardioembolic, known A. Fib    NEURO: Neurologically unchanged, Continue monitoring for neurologic deterioration, normotension, titrate statin to LDL goal less than 70, MRI Brain w/o noted above, Carotid duplex: Mild to moderate bilateral calcified atherosclerotic disease. No significant hemodynamic stenosis of the left carotid artery. Less than 50% stenosis proximal right internal carotid artery Physical therapy/OT home as patient at his baseline.     ANTITHROMBOTIC THERAPY: Eliquis and ASA    PULMONARY:  protecting airway, saturating well     CARDIOVASCULAR: TTE: Left ventricular cavity is normal in size. Left ventricular wall thickness is normal. Left ventricular systolic function is mildly decreased. Regional wall motion abnormalities present. Agitated saline injection was negative for intracardiac shunt., cardiac monitoring                              SBP goal: <180    GASTROINTESTINAL:  dysphagia screen- passed       Diet: Regular    RENAL: [id number], T-TH-S. Nephro consult appreciated, plan for extra HD 4/18 AM then resume 4/19 AM      Na Goal: Greater than 135     Brooks: N    HEMATOLOGY: H/H stable, Platelets thrombocytopenia 83 on 4/17- has had thrombocytopenia in the past  VA Duplex LE: No evidence of deep venous thrombosis in either lower extremity.     DVT ppx: Eliquis    ID: afebrile, no leukocytosis, +MSSA: started on Bactroban nasal ointment for 5 days.     OTHER: plan of care discussed with patient and daughter at bedside    DISPOSITION: Home 04/18 after HD in the morning      CORE MEASURES:        Admission NIHSS: 0     Tenecteplase [] YES [x] NO      LDL/HDL: 59/32     Depression Screen: 0     Statin Therapy: Y     Dysphagia Screen: [x] PASS [] FAIL     Smoking [] YES [x] NO      Afib [x] YES [] NO     Stroke Education [x] YES [] NO

## 2025-04-18 NOTE — PROGRESS NOTE ADULT - SUBJECTIVE AND OBJECTIVE BOX
THE PATIENT WAS SEEN AND EXAMINED BY ME WITH THE HOUSESTAFF AND STROKE TEAM DURING MORNING ROUNDS.   HPI:  EDGAR CLAUDIO is a 59y (1965) man with a PMHx significant for atrial fibrillation on eliquis 5 mg bid, HTN, CABG 2014, ESRD on T/Th/S dialysis 2/2 PKD, CAD s/p stents on ASA 81 mg presented with acute onset right eye blurry vision after taking shower. LKW 1:45 PM 4/15. Denies HA, weakness, numbness, vision loss. Denies prior hx of stroke. Last eliquis dose 10:30 AM 4/15. On admission: NIHSS:2, preMRS:3, walks with walker independently due to b/l hip replacements. Patient was not a tenecteplase candidate given abnormal coags iso eliquis use and not a thrombectomy candidate given no LVO       SUBJECTIVE: No events overnight.  No new neurologic complaints.  ROS reported negative unless otherwise noted.    acetaminophen     Tablet .. 650 milliGRAM(s) Oral every 6 hours PRN  apixaban 5 milliGRAM(s) Oral two times a day  aspirin  chewable 81 milliGRAM(s) Oral daily  atorvastatin 80 milliGRAM(s) Oral at bedtime  metoprolol tartrate 12.5 milliGRAM(s) Oral every 12 hours  mupirocin 2% Nasal 1 Application(s) Both Nostrils two times a day  pantoprazole    Tablet 40 milliGRAM(s) Oral before breakfast  polyethylene glycol 3350 17 Gram(s) Oral two times a day  senna 2 Tablet(s) Oral at bedtime      PHYSICAL EXAM:   Vital Signs Last 24 Hrs  T(C): 36.3 (18 Apr 2025 04:36), Max: 36.8 (17 Apr 2025 14:42)  T(F): 97.3 (18 Apr 2025 04:36), Max: 98.3 (17 Apr 2025 14:42)  HR: 77 (18 Apr 2025 04:36) (69 - 106)  BP: 110/65 (18 Apr 2025 04:36) (96/61 - 132/84)  BP(mean): --  RR: 18 (18 Apr 2025 04:36) (18 - 19)  SpO2: 98% (18 Apr 2025 04:36) (94% - 100%)    Parameters below as of 18 Apr 2025 04:36  Patient On (Oxygen Delivery Method): room air    General: No acute distress  HEENT: EOM intact, RHH  Abdomen: Soft, nontender, nondistended   Extremities: No edema    NEUROLOGICAL EXAM:  Mental status: Awake, alert, oriented x3, speech fluent, follows commands.   Cranial Nerves: No facial asymmetry, no nystagmus, no dysarthria,  tongue midline  Motor exam: Normal tone, no drift, 4-/5 RUE ( chronic), RLE moves sponantenously, 5/5 LUE, LLE movement limited due to chronic pain, normal fine finger movements.  Sensation: Intact to light touch   Coordination/ Gait: No dysmetria, PATTY intact and symmetric bilaterally    LABS:                        9.0    2.61  )-----------( 83       ( 17 Apr 2025 05:55 )             27.4    04-17    135  |  95[L]  |  37[H]  ----------------------------<  86  5.1   |  24  |  6.56[H]    Ca    8.7      17 Apr 2025 05:55  Phos  6.2     04-17  Mg     2.1     04-17          IMAGING: Reviewed by me.       04/16/25: MRI HEAD/MR Orbits No acute cerebral cortical   infarct is found.   No intracranial hemorrhage is recognized.  No mass   effect is found in the brain.   Empty sella syndrome is noted. Prominent optic nerve root sheaths with kinking.    04/15/25 CT HEAD/CTA : Small vessel white matter ischemic changes. No hemorrhage.   Heterogeneous dense calvarium and cervical spine may be related to renal   osteodystrophy. CTA of the head and neck demonstrates calcification of   the carotid bifurcations bilaterally without significant stenosis as well   as distal V4 segment calcifications.        THE PATIENT WAS SEEN AND EXAMINED BY ME WITH THE HOUSESTAFF AND STROKE TEAM DURING MORNING ROUNDS.   HPI:  EDGAR CLAUDIO is a 59y (1965) man with a PMHx significant for atrial fibrillation on eliquis 5 mg bid, HTN, CABG 2014, ESRD on T/Th/S dialysis 2/2 PKD, CAD s/p stents on ASA 81 mg presented with acute onset right eye blurry vision after taking shower. LKW 1:45 PM 4/15. Denies HA, weakness, numbness, vision loss. Denies prior hx of stroke. Last eliquis dose 10:30 AM 4/15. On admission: NIHSS:2, preMRS:3, walks with walker independently due to b/l hip replacements. Patient was not a tenecteplase candidate given abnormal coags iso eliquis use and not a thrombectomy candidate given no LVO       SUBJECTIVE: No events overnight.  No new neurologic complaints.  ROS reported negative unless otherwise noted.    acetaminophen     Tablet .. 650 milliGRAM(s) Oral every 6 hours PRN  apixaban 5 milliGRAM(s) Oral two times a day  aspirin  chewable 81 milliGRAM(s) Oral daily  atorvastatin 80 milliGRAM(s) Oral at bedtime  metoprolol tartrate 12.5 milliGRAM(s) Oral every 12 hours  mupirocin 2% Nasal 1 Application(s) Both Nostrils two times a day  pantoprazole    Tablet 40 milliGRAM(s) Oral before breakfast  polyethylene glycol 3350 17 Gram(s) Oral two times a day  senna 2 Tablet(s) Oral at bedtime      PHYSICAL EXAM:   Vital Signs Last 24 Hrs  T(C): 36.3 (18 Apr 2025 04:36), Max: 36.8 (17 Apr 2025 14:42)  T(F): 97.3 (18 Apr 2025 04:36), Max: 98.3 (17 Apr 2025 14:42)  HR: 77 (18 Apr 2025 04:36) (69 - 106)  BP: 110/65 (18 Apr 2025 04:36) (96/61 - 132/84)  BP(mean): --  RR: 18 (18 Apr 2025 04:36) (18 - 19)  SpO2: 98% (18 Apr 2025 04:36) (94% - 100%)    Parameters below as of 18 Apr 2025 04:36  Patient On (Oxygen Delivery Method): room air      seen at HD  General: No acute distress  HEENT: EOM intact, RHH  Abdomen: Soft, nontender, nondistended   Extremities: No edema    NEUROLOGICAL EXAM:  Mental status: Awake, alert, oriented x3, speech fluent, follows commands.   Cranial Nerves: No facial asymmetry, no nystagmus, no dysarthria,  tongue midline  Motor exam: Normal tone, no drift, 4-/5 RUE ( chronic), RLE moves sponantenously, 5/5 LUE, LLE movement limited due to chronic pain, normal fine finger movements.  Sensation: Intact to light touch   Coordination/ Gait: No dysmetria, PATTY intact and symmetric bilaterally    LABS:                        9.0    2.61  )-----------( 83       ( 17 Apr 2025 05:55 )             27.4    04-17    135  |  95[L]  |  37[H]  ----------------------------<  86  5.1   |  24  |  6.56[H]    Ca    8.7      17 Apr 2025 05:55  Phos  6.2     04-17  Mg     2.1     04-17          IMAGING: Reviewed by me.       04/16/25: MRI HEAD/MR Orbits No acute cerebral cortical   infarct is found.   No intracranial hemorrhage is recognized.  No mass   effect is found in the brain.   Empty sella syndrome is noted. Prominent optic nerve root sheaths with kinking.    04/15/25 CT HEAD/CTA : Small vessel white matter ischemic changes. No hemorrhage.   Heterogeneous dense calvarium and cervical spine may be related to renal   osteodystrophy. CTA of the head and neck demonstrates calcification of   the carotid bifurcations bilaterally without significant stenosis as well   as distal V4 segment calcifications.

## 2025-04-18 NOTE — DISCHARGE NOTE NURSING/CASE MANAGEMENT/SOCIAL WORK - NSDCPEELIQUISFU_GEN_ALL_CORE
Home
Go for blood tests as directed. Your doctor will do lab tests at regular visits to monitor the effects of this medicine. Please follow up with your doctor and keep your health care provider appointments.

## 2025-04-18 NOTE — PROGRESS NOTE ADULT - TIME BILLING
Patient receiving dialysis at the time of examination, no change in vision, being treated for right CRAO.  Will likely be discharged home after dialysis, follow-up with ophthalmology and neurology resident clinic.

## 2025-04-18 NOTE — DISCHARGE NOTE NURSING/CASE MANAGEMENT/SOCIAL WORK - PATIENT PORTAL LINK FT
You can access the FollowMyHealth Patient Portal offered by [facility] by registering at the following website: [email or web address]. By joining [employer]’s FollowMyHealth portal, you will also be able to view your health information using other applications (apps) compatible with our system.

## 2025-04-21 ENCOUNTER — APPOINTMENT (OUTPATIENT)
Dept: ELECTROPHYSIOLOGY | Facility: CLINIC | Age: 60
End: 2025-04-21
Payer: MEDICARE

## 2025-04-21 VITALS
HEART RATE: 76 BPM | WEIGHT: 163.14 LBS | SYSTOLIC BLOOD PRESSURE: 128 MMHG | BODY MASS INDEX: 24.81 KG/M2 | OXYGEN SATURATION: 100 % | DIASTOLIC BLOOD PRESSURE: 76 MMHG

## 2025-04-21 PROCEDURE — 99204 OFFICE O/P NEW MOD 45 MIN: CPT

## 2025-04-21 PROCEDURE — 93000 ELECTROCARDIOGRAM COMPLETE: CPT

## 2025-05-05 ENCOUNTER — NON-APPOINTMENT (OUTPATIENT)
Age: 60
End: 2025-05-05

## 2025-05-05 ENCOUNTER — APPOINTMENT (OUTPATIENT)
Dept: CV DIAGNOSITCS | Facility: HOSPITAL | Age: 60
End: 2025-05-05

## 2025-05-07 ENCOUNTER — TRANSCRIPTION ENCOUNTER (OUTPATIENT)
Age: 60
End: 2025-05-07

## 2025-05-07 ENCOUNTER — RESULT REVIEW (OUTPATIENT)
Age: 60
End: 2025-05-07

## 2025-05-07 ENCOUNTER — APPOINTMENT (OUTPATIENT)
Dept: CV DIAGNOSITCS | Facility: HOSPITAL | Age: 60
End: 2025-05-07

## 2025-05-07 ENCOUNTER — OUTPATIENT (OUTPATIENT)
Dept: OUTPATIENT SERVICES | Facility: HOSPITAL | Age: 60
LOS: 1 days | End: 2025-05-07
Payer: MEDICARE

## 2025-05-07 VITALS
OXYGEN SATURATION: 99 % | HEART RATE: 82 BPM | SYSTOLIC BLOOD PRESSURE: 119 MMHG | WEIGHT: 163.14 LBS | HEIGHT: 68 IN | DIASTOLIC BLOOD PRESSURE: 76 MMHG | RESPIRATION RATE: 15 BRPM | TEMPERATURE: 98 F

## 2025-05-07 VITALS
RESPIRATION RATE: 18 BRPM | HEART RATE: 70 BPM | DIASTOLIC BLOOD PRESSURE: 63 MMHG | OXYGEN SATURATION: 100 % | SYSTOLIC BLOOD PRESSURE: 115 MMHG

## 2025-05-07 DIAGNOSIS — I25.810 ATHEROSCLEROSIS OF CORONARY ARTERY BYPASS GRAFT(S) WITHOUT ANGINA PECTORIS: Chronic | ICD-10-CM

## 2025-05-07 DIAGNOSIS — I48.91 UNSPECIFIED ATRIAL FIBRILLATION: ICD-10-CM

## 2025-05-07 LAB
ANION GAP SERPL CALC-SCNC: 14 MMOL/L — SIGNIFICANT CHANGE UP (ref 5–17)
BUN SERPL-MCNC: 27 MG/DL — HIGH (ref 7–23)
CALCIUM SERPL-MCNC: 9.2 MG/DL — SIGNIFICANT CHANGE UP (ref 8.4–10.5)
CHLORIDE SERPL-SCNC: 99 MMOL/L — SIGNIFICANT CHANGE UP (ref 96–108)
CO2 SERPL-SCNC: 28 MMOL/L — SIGNIFICANT CHANGE UP (ref 22–31)
CREAT SERPL-MCNC: 4.63 MG/DL — HIGH (ref 0.5–1.3)
EGFR: 14 ML/MIN/1.73M2 — LOW
EGFR: 14 ML/MIN/1.73M2 — LOW
GLUCOSE SERPL-MCNC: 86 MG/DL — SIGNIFICANT CHANGE UP (ref 70–99)
HCT VFR BLD CALC: 33.1 % — LOW (ref 39–50)
HGB BLD-MCNC: 10.7 G/DL — LOW (ref 13–17)
MCHC RBC-ENTMCNC: 31.8 PG — SIGNIFICANT CHANGE UP (ref 27–34)
MCHC RBC-ENTMCNC: 32.3 G/DL — SIGNIFICANT CHANGE UP (ref 32–36)
MCV RBC AUTO: 98.2 FL — SIGNIFICANT CHANGE UP (ref 80–100)
NRBC BLD AUTO-RTO: 0 /100 WBCS — SIGNIFICANT CHANGE UP (ref 0–0)
PLATELET # BLD AUTO: 103 K/UL — LOW (ref 150–400)
POTASSIUM SERPL-MCNC: 4.6 MMOL/L — SIGNIFICANT CHANGE UP (ref 3.5–5.3)
POTASSIUM SERPL-SCNC: 4.6 MMOL/L — SIGNIFICANT CHANGE UP (ref 3.5–5.3)
RBC # BLD: 3.37 M/UL — LOW (ref 4.2–5.8)
RBC # FLD: 14.7 % — HIGH (ref 10.3–14.5)
SODIUM SERPL-SCNC: 141 MMOL/L — SIGNIFICANT CHANGE UP (ref 135–145)
WBC # BLD: 2.85 K/UL — LOW (ref 3.8–10.5)
WBC # FLD AUTO: 2.85 K/UL — LOW (ref 3.8–10.5)

## 2025-05-07 PROCEDURE — 93010 ELECTROCARDIOGRAM REPORT: CPT

## 2025-05-07 PROCEDURE — 93325 DOPPLER ECHO COLOR FLOW MAPG: CPT

## 2025-05-07 PROCEDURE — 80048 BASIC METABOLIC PNL TOTAL CA: CPT

## 2025-05-07 PROCEDURE — 93312 ECHO TRANSESOPHAGEAL: CPT

## 2025-05-07 PROCEDURE — 93320 DOPPLER ECHO COMPLETE: CPT

## 2025-05-07 PROCEDURE — 93005 ELECTROCARDIOGRAM TRACING: CPT

## 2025-05-07 PROCEDURE — 92960 CARDIOVERSION ELECTRIC EXT: CPT

## 2025-05-07 PROCEDURE — 36415 COLL VENOUS BLD VENIPUNCTURE: CPT

## 2025-05-07 PROCEDURE — 85027 COMPLETE CBC AUTOMATED: CPT

## 2025-05-07 RX ORDER — APIXABAN 2.5 MG/1
1 TABLET, FILM COATED ORAL
Refills: 0 | DISCHARGE

## 2025-05-07 RX ORDER — AMIODARONE HYDROCHLORIDE 50 MG/ML
1 INJECTION, SOLUTION INTRAVENOUS
Qty: 19 | Refills: 0
Start: 2025-05-07 | End: 2025-05-16

## 2025-05-07 RX ORDER — FERRIC CITRATE 210 MG/1
2 TABLET, COATED ORAL
Refills: 0 | DISCHARGE

## 2025-05-07 RX ORDER — AMIODARONE HYDROCHLORIDE 50 MG/ML
200 INJECTION, SOLUTION INTRAVENOUS DAILY
Refills: 0 | Status: CANCELLED | OUTPATIENT
Start: 2025-05-17 | End: 2025-05-07

## 2025-05-07 RX ORDER — AMIODARONE HYDROCHLORIDE 50 MG/ML
400 INJECTION, SOLUTION INTRAVENOUS EVERY 12 HOURS
Refills: 0 | Status: DISCONTINUED | OUTPATIENT
Start: 2025-05-07 | End: 2025-05-07

## 2025-05-07 RX ORDER — METOPROLOL SUCCINATE 50 MG/1
1 TABLET, EXTENDED RELEASE ORAL
Refills: 0 | DISCHARGE

## 2025-05-07 RX ORDER — AMIODARONE HYDROCHLORIDE 50 MG/ML
INJECTION, SOLUTION INTRAVENOUS
Refills: 0 | Status: DISCONTINUED | OUTPATIENT
Start: 2025-05-07 | End: 2025-05-07

## 2025-05-07 RX ADMIN — AMIODARONE HYDROCHLORIDE 400 MILLIGRAM(S): 50 INJECTION, SOLUTION INTRAVENOUS at 16:05

## 2025-05-07 NOTE — ASU PATIENT PROFILE, ADULT - FALL HARM RISK - HARM RISK INTERVENTIONS

## 2025-05-07 NOTE — PRE-ANESTHESIA EVALUATION ADULT - NSANTHPMHFT_GEN_ALL_CORE
60 year old male h/o HTN, HLD, MI, CAD s/p PCI and CABG 2014, THR bilat 2016, [facility] 2/2 PKD awaiting transplant, severe TR, mod-severe TI, mod AS, mildly reduced LV fxn, afib on Eliquis), Chronic Hepatitis C, who presents for ONEL/DCCV with hopes of rhythm control in the setting of severe TR with severe pulmonary hypertension.    last HD 5/6/25;  METS<4

## 2025-05-07 NOTE — H&P CARDIOLOGY - LIVES WITH, PROFILE
22yo female with no pmh presents with cp. 22yo female with no pmh presents with cp - low risk MACE with heart score 0, pt low risk PE with neg ddimer - labs wnl, likely msk pain pt stable for dc with follow up with pmd spouse

## 2025-05-07 NOTE — ASU DISCHARGE PLAN (ADULT/PEDIATRIC) - ASU DC SPECIAL INSTRUCTIONSFT
See preprinted instructions provided    Amiodarone 400mg every 12 hours for 10 days then Amiodarone 200mg daily     Follow up with Dr. Lechuga in 1 month

## 2025-05-07 NOTE — ASU DISCHARGE PLAN (ADULT/PEDIATRIC) - FINANCIAL ASSISTANCE
[facility] provides services at a reduced cost to those who are determined to be eligible through [facility]’s financial assistance program. Information regarding [facility]’s financial assistance program can be found by going to [email or web address] or by calling [phone].

## 2025-05-07 NOTE — ASU PATIENT PROFILE, ADULT - ABILITY TO HEAR (WITH HEARING AID OR HEARING APPLIANCE IF NORMALLY USED):
You can access the FollowMyHealth Patient Portal offered by [facility] by registering at the following website: [email or web address]. By joining [employer]’s FollowMyHealth portal, you will also be able to view your health information using other applications (apps) compatible with our system. Adequate: hears normal conversation without difficulty

## 2025-05-07 NOTE — PRE-ANESTHESIA EVALUATION ADULT - NSANTHOSAYNRD_GEN_A_CORE
No. IVETTE screening performed.  STOP BANG Legend: 0-2 = LOW Risk; 3-4 = INTERMEDIATE Risk; 5-8 = HIGH Risk

## 2025-05-07 NOTE — H&P CARDIOLOGY - NSICDXFAMILYHX_GEN_ALL_CORE_FT
FAMILY HISTORY:  Father  Still living? Unknown  Family history of chronic renal disease, Age at diagnosis: Age Unknown

## 2025-05-07 NOTE — ASU DISCHARGE PLAN (ADULT/PEDIATRIC) - NS MD DC FALL RISK RISK
For information on Fall & Injury Prevention, visit: [email or web address] OR  [email or web address] OR  https://www.cdc.gov/steadi/patient.html

## 2025-05-07 NOTE — PRE-ANESTHESIA EVALUATION ADULT - NSRADCARDRESULTSFT_GEN_ALL_CORE
< from: TTE Limited W or WO Ultrasound Enhancing Agent (04.16.25 @ 08:25) >    _______________________________________________________________________________________     CONCLUSIONS:      1. Left ventricular cavity is normal in size. Left ventricular wall thickness is normal. Left ventricular systolic function is mildly decreased. Regional wall motion abnormalities present.   2. Basal and mid inferior wall and basal inferoseptal segment are abnormal.   3. Enlarged right ventricular cavity size, with normal wall thickness, and reduced right ventricular systolic function.   4. Left atrium is severely dilated.   5. The right atrium is severely dilated.   6. No pericardial effusion seen.   7. Severe tricuspid regurgitation.   8. No prior echocardiogram is available for comparison.   9. Agitated saline injection was negative for intracardiac shunt.  10. Estimated pulmonary artery systolic pressure is 65 mmHg, consistent with severe pulmonary hypertension.  11. Moderate to severe aortic regurgitation.  12. Severe left ventricular hypertrophy.  13. There is no evidence of a left ventricular thrombus.  14. Trileaflet aortic valve with reduced systolic excursion. There is severe calcification of the aortic valve leaflets. Moderate aortic stenosis.    < end of copied text >

## 2025-05-07 NOTE — ASU DISCHARGE PLAN (ADULT/PEDIATRIC) - CARE PROVIDER_API CALL
Gloria Lechuga  Cardiac Electrophysiology  [address] [place] [id number]  Phone: [phone]  Fax: [phone]  Established Patient  Follow Up Time: 1 month

## 2025-05-07 NOTE — H&P CARDIOLOGY - NSICDXPASTMEDICALHX_GEN_ALL_CORE_FT
PAST MEDICAL HISTORY:  Arteriosclerosis of coronary artery in patient with history of myocardial infarction     Coronary artery disease     End stage renal disease on dialysis     H/O pulmonary hypertension     H/O tricuspid valve disease     Hepatitis C     HTN (hypertension)     Polycystic kidney     Polycystic liver disease     Splenic artery aneurysm

## 2025-05-07 NOTE — H&P CARDIOLOGY - HISTORY OF PRESENT ILLNESS
60 year old male h/o HTN, HLD, MI, CAD s/p PCI and CABG 2014, THR 2016, ESRD (TThSAT on HD) 2/2 PKD awaiting transplant, severe TR, mod-severe TI, mod AS, mildly reduced LV fxn, afib on Eliquis), Chronic Hepatitis C, who presents for ONEL/DCCV with hopes of rhythm control in the setting of severe TR with severe pulmonary hypertension.    Cards: Dr Ruben Patel 60 year old male h/o HTN, HLD, MI, CAD s/p PCI and CABG 2014, THR 2016, ESR[facility] Dr Dionicio Anders[facility]) 2/2 PKD awaiting transplant, severe TR, mod-severe TI, mod AS, mildly reduced LV fxn, afib on Eliquis), Chronic Hepatitis C, who presents for ONEL/DCCV with hopes of rhythm control in the setting of severe TR with severe pulmonary hypertension.    Cards: Dr Ruben Patel 60 year old male h/o HTN, HLD, MI, CAD s/p PCI and CABG 2014, THR bilat 2016, ESRD ([facility] HD [facility]: Renal Dr Dionicio Andersen) 2/2 PKD awaiting transplant, severe TR, mod-severe TI, mod AS, mildly reduced LV fxn, afib on Eliquis), Chronic Hepatitis C, who presents for ONEL/DCCV with hopes of rhythm control in the setting of severe TR with severe pulmonary hypertension.    Cards: Dr Ruben Patel

## 2025-05-07 NOTE — H&P CARDIOLOGY - GAIT/BALANCE
pt uses walker in house  pt uses wheelchair out of the home  Ambulation limited d/t pain s/p left THR

## 2025-05-09 RX ORDER — LANTHANUM CARBONATE 500 MG/1
1 TABLET, CHEWABLE ORAL
Refills: 0 | DISCHARGE

## 2025-05-18 RX ORDER — AMIODARONE HYDROCHLORIDE 50 MG/ML
1 INJECTION, SOLUTION INTRAVENOUS
Qty: 90 | Refills: 0
Start: 2025-05-18 | End: 2025-08-15

## 2025-05-23 RX ORDER — AMIODARONE HYDROCHLORIDE 100 MG/1
100 TABLET ORAL
Qty: 90 | Refills: 1 | Status: ACTIVE | COMMUNITY
Start: 2025-05-23

## 2025-06-02 ENCOUNTER — APPOINTMENT (OUTPATIENT)
Dept: ELECTROPHYSIOLOGY | Facility: CLINIC | Age: 60
End: 2025-06-02

## 2025-06-02 VITALS
HEART RATE: 60 BPM | DIASTOLIC BLOOD PRESSURE: 76 MMHG | SYSTOLIC BLOOD PRESSURE: 132 MMHG | WEIGHT: 163 LBS | BODY MASS INDEX: 24.78 KG/M2 | OXYGEN SATURATION: 99 %

## 2025-06-02 PROCEDURE — 93000 ELECTROCARDIOGRAM COMPLETE: CPT

## 2025-06-02 PROCEDURE — 99214 OFFICE O/P EST MOD 30 MIN: CPT

## 2025-06-02 RX ORDER — LANTHANUM CARBONATE 1000 MG/1
1000 TABLET, CHEWABLE ORAL 3 TIMES DAILY
Refills: 0 | Status: ACTIVE | COMMUNITY

## 2025-06-02 RX ORDER — SIMVASTATIN 20 MG/1
20 TABLET, FILM COATED ORAL
Refills: 0 | Status: ACTIVE | COMMUNITY

## 2025-06-10 PROBLEM — Z86.79 PERSONAL HISTORY OF OTHER DISEASES OF THE CIRCULATORY SYSTEM: Chronic | Status: ACTIVE | Noted: 2025-05-07

## 2025-06-10 PROBLEM — I72.8 ANEURYSM OF OTHER SPECIFIED ARTERIES: Chronic | Status: ACTIVE | Noted: 2025-05-07

## 2025-06-10 PROBLEM — I25.10 ATHEROSCLEROTIC HEART DISEASE OF NATIVE CORONARY ARTERY WITHOUT ANGINA PECTORIS: Chronic | Status: ACTIVE | Noted: 2025-05-07

## 2025-06-12 ENCOUNTER — APPOINTMENT (OUTPATIENT)
Dept: GASTROENTEROLOGY | Facility: CLINIC | Age: 60
End: 2025-06-12

## 2025-07-21 ENCOUNTER — APPOINTMENT (OUTPATIENT)
Dept: ELECTROPHYSIOLOGY | Facility: CLINIC | Age: 60
End: 2025-07-21
Payer: MEDICARE

## 2025-07-21 VITALS — SYSTOLIC BLOOD PRESSURE: 120 MMHG | HEART RATE: 61 BPM | DIASTOLIC BLOOD PRESSURE: 66 MMHG

## 2025-07-21 DIAGNOSIS — I50.20 UNSPECIFIED SYSTOLIC (CONGESTIVE) HEART FAILURE: ICD-10-CM

## 2025-07-21 DIAGNOSIS — I48.0 PAROXYSMAL ATRIAL FIBRILLATION: ICD-10-CM

## 2025-07-21 PROCEDURE — 93000 ELECTROCARDIOGRAM COMPLETE: CPT

## 2025-07-21 PROCEDURE — 99214 OFFICE O/P EST MOD 30 MIN: CPT

## 2025-07-24 PROBLEM — I50.20 HFREF (HEART FAILURE WITH REDUCED EJECTION FRACTION): Status: ACTIVE | Noted: 2025-07-24

## 2025-07-24 PROBLEM — I50.20 HEART FAILURE WITH MILDLY REDUCED EJECTION FRACTION (HFMREF): Status: ACTIVE | Noted: 2025-07-24

## 2025-08-01 ENCOUNTER — INPATIENT (INPATIENT)
Facility: HOSPITAL | Age: 60
LOS: 9 days | Discharge: HOME | DRG: 379 | End: 2025-08-11
Attending: INTERNAL MEDICINE | Admitting: INTERNAL MEDICINE
Payer: MEDICARE

## 2025-08-01 VITALS
HEIGHT: 68 IN | HEART RATE: 71 BPM | SYSTOLIC BLOOD PRESSURE: 174 MMHG | RESPIRATION RATE: 20 BRPM | WEIGHT: 158.73 LBS | DIASTOLIC BLOOD PRESSURE: 78 MMHG | OXYGEN SATURATION: 100 % | TEMPERATURE: 98 F

## 2025-08-01 DIAGNOSIS — Z29.9 ENCOUNTER FOR PROPHYLACTIC MEASURES, UNSPECIFIED: ICD-10-CM

## 2025-08-01 DIAGNOSIS — I25.10 ATHEROSCLEROTIC HEART DISEASE OF NATIVE CORONARY ARTERY WITHOUT ANGINA PECTORIS: ICD-10-CM

## 2025-08-01 DIAGNOSIS — R93.89 ABNORMAL FINDINGS ON DIAGNOSTIC IMAGING OF OTHER SPECIFIED BODY STRUCTURES: ICD-10-CM

## 2025-08-01 DIAGNOSIS — I25.810 ATHEROSCLEROSIS OF CORONARY ARTERY BYPASS GRAFT(S) WITHOUT ANGINA PECTORIS: Chronic | ICD-10-CM

## 2025-08-01 DIAGNOSIS — I48.0 PAROXYSMAL ATRIAL FIBRILLATION: ICD-10-CM

## 2025-08-01 DIAGNOSIS — D64.9 ANEMIA, UNSPECIFIED: ICD-10-CM

## 2025-08-01 DIAGNOSIS — N18.6 END STAGE RENAL DISEASE: ICD-10-CM

## 2025-08-01 DIAGNOSIS — K21.9 GASTRO-ESOPHAGEAL REFLUX DISEASE WITHOUT ESOPHAGITIS: ICD-10-CM

## 2025-08-01 DIAGNOSIS — K62.5 HEMORRHAGE OF ANUS AND RECTUM: ICD-10-CM

## 2025-08-01 LAB
ALBUMIN SERPL ELPH-MCNC: 3.9 G/DL — SIGNIFICANT CHANGE UP (ref 3.3–5)
ALP SERPL-CCNC: 197 U/L — HIGH (ref 40–120)
ALT FLD-CCNC: <5 U/L — LOW (ref 10–45)
ANION GAP SERPL CALC-SCNC: 16 MMOL/L — SIGNIFICANT CHANGE UP (ref 5–17)
AST SERPL-CCNC: 19 U/L — SIGNIFICANT CHANGE UP (ref 10–40)
BASOPHILS # BLD AUTO: 0.04 K/UL — SIGNIFICANT CHANGE UP (ref 0–0.2)
BASOPHILS # BLD MANUAL: 0.05 K/UL — SIGNIFICANT CHANGE UP (ref 0–0.2)
BASOPHILS NFR BLD AUTO: 1.4 % — SIGNIFICANT CHANGE UP (ref 0–2)
BASOPHILS NFR BLD MANUAL: 1.7 % — SIGNIFICANT CHANGE UP (ref 0–2)
BILIRUB SERPL-MCNC: 0.6 MG/DL — SIGNIFICANT CHANGE UP (ref 0.2–1.2)
BUN SERPL-MCNC: 24 MG/DL — HIGH (ref 7–23)
CALCIUM SERPL-MCNC: 8.8 MG/DL — SIGNIFICANT CHANGE UP (ref 8.4–10.5)
CHLORIDE SERPL-SCNC: 96 MMOL/L — SIGNIFICANT CHANGE UP (ref 96–108)
CO2 SERPL-SCNC: 25 MMOL/L — SIGNIFICANT CHANGE UP (ref 22–31)
CREAT SERPL-MCNC: 4.59 MG/DL — HIGH (ref 0.5–1.3)
EGFR: 14 ML/MIN/1.73M2 — LOW
EGFR: 14 ML/MIN/1.73M2 — LOW
EOSINOPHIL # BLD AUTO: 0.07 K/UL — SIGNIFICANT CHANGE UP (ref 0–0.5)
EOSINOPHIL # BLD MANUAL: 0.02 K/UL — SIGNIFICANT CHANGE UP (ref 0–0.5)
EOSINOPHIL NFR BLD AUTO: 2.4 % — SIGNIFICANT CHANGE UP (ref 0–6)
EOSINOPHIL NFR BLD MANUAL: 0.8 % — SIGNIFICANT CHANGE UP (ref 0–6)
GAS PNL BLDV: SIGNIFICANT CHANGE UP
GLUCOSE SERPL-MCNC: 84 MG/DL — SIGNIFICANT CHANGE UP (ref 70–99)
HCT VFR BLD CALC: 28.6 % — LOW (ref 39–50)
HGB BLD-MCNC: 8.9 G/DL — LOW (ref 13–17)
IMM GRANULOCYTES # BLD AUTO: 0.01 K/UL — SIGNIFICANT CHANGE UP (ref 0–0.07)
IMM GRANULOCYTES NFR BLD AUTO: 0.3 % — SIGNIFICANT CHANGE UP (ref 0–0.9)
IMMATURE PLATELET FRACTION #: 2.2 K/UL — LOW (ref 3.9–12.5)
IMMATURE PLATELET FRACTION %: 2.1 % — SIGNIFICANT CHANGE UP (ref 1.6–7.1)
LYMPHOCYTES # BLD AUTO: 0.99 K/UL — LOW (ref 1–3.3)
LYMPHOCYTES # BLD MANUAL: 0.98 K/UL — LOW (ref 1–3.3)
LYMPHOCYTES NFR BLD AUTO: 33.4 % — SIGNIFICANT CHANGE UP (ref 13–44)
LYMPHOCYTES NFR BLD MANUAL: 33.1 % — SIGNIFICANT CHANGE UP (ref 13–44)
MAGNESIUM SERPL-MCNC: 2.1 MG/DL — SIGNIFICANT CHANGE UP (ref 1.6–2.6)
MANUAL NEUTROPHIL BANDS #: 0.05 K/UL — SIGNIFICANT CHANGE UP (ref 0–0.84)
MCHC RBC-ENTMCNC: 29.5 PG — SIGNIFICANT CHANGE UP (ref 27–34)
MCHC RBC-ENTMCNC: 31.1 G/DL — LOW (ref 32–36)
MCV RBC AUTO: 94.7 FL — SIGNIFICANT CHANGE UP (ref 80–100)
MONOCYTES # BLD AUTO: 0.32 K/UL — SIGNIFICANT CHANGE UP (ref 0–0.9)
MONOCYTES # BLD MANUAL: 0.25 K/UL — SIGNIFICANT CHANGE UP (ref 0–0.9)
MONOCYTES NFR BLD AUTO: 10.8 % — SIGNIFICANT CHANGE UP (ref 2–14)
MONOCYTES NFR BLD MANUAL: 8.5 % — SIGNIFICANT CHANGE UP (ref 2–14)
NEUTROPHILS # BLD AUTO: 1.53 K/UL — LOW (ref 1.8–7.4)
NEUTROPHILS # BLD MANUAL: 1.6 K/UL — LOW (ref 1.8–7.4)
NEUTROPHILS NFR BLD AUTO: 51.7 % — SIGNIFICANT CHANGE UP (ref 43–77)
NEUTROPHILS NFR BLD MANUAL: 54.2 % — SIGNIFICANT CHANGE UP (ref 43–77)
NEUTS BAND # BLD: 1.7 % — SIGNIFICANT CHANGE UP (ref 0–8)
NEUTS BAND NFR BLD: 1.7 % — SIGNIFICANT CHANGE UP (ref 0–8)
NRBC # BLD AUTO: 0 K/UL — SIGNIFICANT CHANGE UP (ref 0–0)
NRBC # FLD: 0 K/UL — SIGNIFICANT CHANGE UP (ref 0–0)
NRBC BLD AUTO-RTO: 0 /100 WBCS — SIGNIFICANT CHANGE UP (ref 0–0)
NT-PROBNP SERPL-SCNC: HIGH PG/ML (ref 0–300)
PHOSPHATE SERPL-MCNC: 3.9 MG/DL — SIGNIFICANT CHANGE UP (ref 2.5–4.5)
PLAT MORPH BLD: NORMAL — SIGNIFICANT CHANGE UP
PLATELET # BLD AUTO: 106 K/UL — LOW (ref 150–400)
PMV BLD: 9.5 FL — SIGNIFICANT CHANGE UP (ref 7–13)
POTASSIUM SERPL-MCNC: 4.9 MMOL/L — SIGNIFICANT CHANGE UP (ref 3.5–5.3)
POTASSIUM SERPL-SCNC: 4.9 MMOL/L — SIGNIFICANT CHANGE UP (ref 3.5–5.3)
PROT SERPL-MCNC: 7.7 G/DL — SIGNIFICANT CHANGE UP (ref 6–8.3)
RBC # BLD: 3.02 M/UL — LOW (ref 4.2–5.8)
RBC # FLD: 16.6 % — HIGH (ref 10.3–14.5)
RBC BLD AUTO: SIGNIFICANT CHANGE UP
SODIUM SERPL-SCNC: 137 MMOL/L — SIGNIFICANT CHANGE UP (ref 135–145)
TROPONIN T, HIGH SENSITIVITY RESULT: 51 NG/L — SIGNIFICANT CHANGE UP (ref 0–51)
WBC # BLD: 2.96 K/UL — LOW (ref 3.8–10.5)
WBC # FLD AUTO: 2.96 K/UL — LOW (ref 3.8–10.5)

## 2025-08-01 PROCEDURE — 85018 HEMOGLOBIN: CPT

## 2025-08-01 PROCEDURE — 71045 X-RAY EXAM CHEST 1 VIEW: CPT

## 2025-08-01 PROCEDURE — 83605 ASSAY OF LACTIC ACID: CPT

## 2025-08-01 PROCEDURE — 99285 EMERGENCY DEPT VISIT HI MDM: CPT

## 2025-08-01 PROCEDURE — 84295 ASSAY OF SERUM SODIUM: CPT

## 2025-08-01 PROCEDURE — 83880 ASSAY OF NATRIURETIC PEPTIDE: CPT

## 2025-08-01 PROCEDURE — 93010 ELECTROCARDIOGRAM REPORT: CPT

## 2025-08-01 PROCEDURE — 82330 ASSAY OF CALCIUM: CPT

## 2025-08-01 PROCEDURE — 85014 HEMATOCRIT: CPT

## 2025-08-01 PROCEDURE — 82435 ASSAY OF BLOOD CHLORIDE: CPT

## 2025-08-01 PROCEDURE — 83735 ASSAY OF MAGNESIUM: CPT

## 2025-08-01 PROCEDURE — 82947 ASSAY GLUCOSE BLOOD QUANT: CPT

## 2025-08-01 PROCEDURE — 80053 COMPREHEN METABOLIC PANEL: CPT

## 2025-08-01 PROCEDURE — 85025 COMPLETE CBC W/AUTO DIFF WBC: CPT

## 2025-08-01 PROCEDURE — 84100 ASSAY OF PHOSPHORUS: CPT

## 2025-08-01 PROCEDURE — 99223 1ST HOSP IP/OBS HIGH 75: CPT

## 2025-08-01 PROCEDURE — 84484 ASSAY OF TROPONIN QUANT: CPT

## 2025-08-01 PROCEDURE — 82803 BLOOD GASES ANY COMBINATION: CPT

## 2025-08-01 PROCEDURE — 84132 ASSAY OF SERUM POTASSIUM: CPT

## 2025-08-01 PROCEDURE — 71045 X-RAY EXAM CHEST 1 VIEW: CPT | Mod: 26

## 2025-08-01 RX ORDER — EPOETIN ALFA 10000 [IU]/ML
10000 SOLUTION INTRAVENOUS; SUBCUTANEOUS
Refills: 0 | Status: DISCONTINUED | OUTPATIENT
Start: 2025-08-01 | End: 2025-08-11

## 2025-08-02 LAB
ALBUMIN SERPL ELPH-MCNC: 3.4 G/DL — SIGNIFICANT CHANGE UP (ref 3.3–5)
ALP SERPL-CCNC: 161 U/L — HIGH (ref 40–120)
ALT FLD-CCNC: <5 U/L — LOW (ref 10–45)
ANION GAP SERPL CALC-SCNC: 13 MMOL/L — SIGNIFICANT CHANGE UP (ref 5–17)
AST SERPL-CCNC: 13 U/L — SIGNIFICANT CHANGE UP (ref 10–40)
BASOPHILS # BLD AUTO: 0.03 K/UL — SIGNIFICANT CHANGE UP (ref 0–0.2)
BASOPHILS NFR BLD AUTO: 1.3 % — SIGNIFICANT CHANGE UP (ref 0–2)
BILIRUB SERPL-MCNC: 0.6 MG/DL — SIGNIFICANT CHANGE UP (ref 0.2–1.2)
BUN SERPL-MCNC: 27 MG/DL — HIGH (ref 7–23)
CALCIUM SERPL-MCNC: 8.3 MG/DL — LOW (ref 8.4–10.5)
CHLORIDE SERPL-SCNC: 97 MMOL/L — SIGNIFICANT CHANGE UP (ref 96–108)
CO2 SERPL-SCNC: 25 MMOL/L — SIGNIFICANT CHANGE UP (ref 22–31)
CREAT SERPL-MCNC: 5.46 MG/DL — HIGH (ref 0.5–1.3)
EGFR: 11 ML/MIN/1.73M2 — LOW
EGFR: 11 ML/MIN/1.73M2 — LOW
EOSINOPHIL # BLD AUTO: 0.09 K/UL — SIGNIFICANT CHANGE UP (ref 0–0.5)
EOSINOPHIL NFR BLD AUTO: 3.9 % — SIGNIFICANT CHANGE UP (ref 0–6)
GLUCOSE SERPL-MCNC: 79 MG/DL — SIGNIFICANT CHANGE UP (ref 70–99)
HCT VFR BLD CALC: 25.8 % — LOW (ref 39–50)
HGB BLD-MCNC: 8 G/DL — LOW (ref 13–17)
IMM GRANULOCYTES # BLD AUTO: 0.01 K/UL — SIGNIFICANT CHANGE UP (ref 0–0.07)
IMM GRANULOCYTES NFR BLD AUTO: 0.4 % — SIGNIFICANT CHANGE UP (ref 0–0.9)
IMMATURE PLATELET FRACTION #: 2.4 K/UL — LOW (ref 3.9–12.5)
IMMATURE PLATELET FRACTION %: 2.4 % — SIGNIFICANT CHANGE UP (ref 1.6–7.1)
LYMPHOCYTES # BLD AUTO: 0.97 K/UL — LOW (ref 1–3.3)
LYMPHOCYTES NFR BLD AUTO: 42 % — SIGNIFICANT CHANGE UP (ref 13–44)
MAGNESIUM SERPL-MCNC: 2.2 MG/DL — SIGNIFICANT CHANGE UP (ref 1.6–2.6)
MCHC RBC-ENTMCNC: 29.4 PG — SIGNIFICANT CHANGE UP (ref 27–34)
MCHC RBC-ENTMCNC: 31 G/DL — LOW (ref 32–36)
MCV RBC AUTO: 94.9 FL — SIGNIFICANT CHANGE UP (ref 80–100)
MONOCYTES # BLD AUTO: 0.25 K/UL — SIGNIFICANT CHANGE UP (ref 0–0.9)
MONOCYTES NFR BLD AUTO: 10.8 % — SIGNIFICANT CHANGE UP (ref 2–14)
NEUTROPHILS # BLD AUTO: 0.96 K/UL — LOW (ref 1.8–7.4)
NEUTROPHILS NFR BLD AUTO: 41.6 % — LOW (ref 43–77)
NRBC # BLD AUTO: 0 K/UL — SIGNIFICANT CHANGE UP (ref 0–0)
NRBC # FLD: 0 K/UL — SIGNIFICANT CHANGE UP (ref 0–0)
NRBC BLD AUTO-RTO: 0 /100 WBCS — SIGNIFICANT CHANGE UP (ref 0–0)
PHOSPHATE SERPL-MCNC: 4.5 MG/DL — SIGNIFICANT CHANGE UP (ref 2.5–4.5)
PLATELET # BLD AUTO: 98 K/UL — LOW (ref 150–400)
PMV BLD: 10.2 FL — SIGNIFICANT CHANGE UP (ref 7–13)
POTASSIUM SERPL-MCNC: 4.4 MMOL/L — SIGNIFICANT CHANGE UP (ref 3.5–5.3)
POTASSIUM SERPL-SCNC: 4.4 MMOL/L — SIGNIFICANT CHANGE UP (ref 3.5–5.3)
PROT SERPL-MCNC: 6.6 G/DL — SIGNIFICANT CHANGE UP (ref 6–8.3)
RBC # BLD: 2.72 M/UL — LOW (ref 4.2–5.8)
RBC # FLD: 16.9 % — HIGH (ref 10.3–14.5)
SODIUM SERPL-SCNC: 135 MMOL/L — SIGNIFICANT CHANGE UP (ref 135–145)
WBC # BLD: 2.31 K/UL — LOW (ref 3.8–10.5)
WBC # FLD AUTO: 2.31 K/UL — LOW (ref 3.8–10.5)

## 2025-08-02 PROCEDURE — 71046 X-RAY EXAM CHEST 2 VIEWS: CPT | Mod: 26

## 2025-08-02 PROCEDURE — 71046 X-RAY EXAM CHEST 2 VIEWS: CPT

## 2025-08-02 PROCEDURE — 83735 ASSAY OF MAGNESIUM: CPT

## 2025-08-02 PROCEDURE — 71045 X-RAY EXAM CHEST 1 VIEW: CPT

## 2025-08-02 PROCEDURE — 85018 HEMOGLOBIN: CPT

## 2025-08-02 PROCEDURE — 84100 ASSAY OF PHOSPHORUS: CPT

## 2025-08-02 PROCEDURE — 83605 ASSAY OF LACTIC ACID: CPT

## 2025-08-02 PROCEDURE — 82435 ASSAY OF BLOOD CHLORIDE: CPT

## 2025-08-02 PROCEDURE — 82330 ASSAY OF CALCIUM: CPT

## 2025-08-02 PROCEDURE — 84484 ASSAY OF TROPONIN QUANT: CPT

## 2025-08-02 PROCEDURE — 85014 HEMATOCRIT: CPT

## 2025-08-02 PROCEDURE — 84132 ASSAY OF SERUM POTASSIUM: CPT

## 2025-08-02 PROCEDURE — 85025 COMPLETE CBC W/AUTO DIFF WBC: CPT

## 2025-08-02 PROCEDURE — 84295 ASSAY OF SERUM SODIUM: CPT

## 2025-08-02 PROCEDURE — 82947 ASSAY GLUCOSE BLOOD QUANT: CPT

## 2025-08-02 PROCEDURE — 82272 OCCULT BLD FECES 1-3 TESTS: CPT

## 2025-08-02 PROCEDURE — 80053 COMPREHEN METABOLIC PANEL: CPT

## 2025-08-02 PROCEDURE — 83880 ASSAY OF NATRIURETIC PEPTIDE: CPT

## 2025-08-02 PROCEDURE — 93005 ELECTROCARDIOGRAM TRACING: CPT

## 2025-08-02 PROCEDURE — 82803 BLOOD GASES ANY COMBINATION: CPT

## 2025-08-02 RX ORDER — MELATONIN 5 MG
3 TABLET ORAL AT BEDTIME
Refills: 0 | Status: DISCONTINUED | OUTPATIENT
Start: 2025-08-02 | End: 2025-08-11

## 2025-08-02 RX ORDER — ATORVASTATIN CALCIUM 80 MG/1
10 TABLET, FILM COATED ORAL AT BEDTIME
Refills: 0 | Status: DISCONTINUED | OUTPATIENT
Start: 2025-08-02 | End: 2025-08-11

## 2025-08-02 RX ORDER — AMIODARONE HYDROCHLORIDE 50 MG/ML
200 INJECTION, SOLUTION INTRAVENOUS AT BEDTIME
Refills: 0 | Status: DISCONTINUED | OUTPATIENT
Start: 2025-08-02 | End: 2025-08-11

## 2025-08-02 RX ORDER — SEVELAMER HYDROCHLORIDE 800 MG/1
800 TABLET ORAL
Refills: 0 | Status: DISCONTINUED | OUTPATIENT
Start: 2025-08-02 | End: 2025-08-11

## 2025-08-02 RX ORDER — ACETAMINOPHEN 500 MG/5ML
650 LIQUID (ML) ORAL EVERY 6 HOURS
Refills: 0 | Status: DISCONTINUED | OUTPATIENT
Start: 2025-08-02 | End: 2025-08-11

## 2025-08-02 RX ORDER — MAGNESIUM, ALUMINUM HYDROXIDE 200-200 MG
30 TABLET,CHEWABLE ORAL EVERY 4 HOURS
Refills: 0 | Status: DISCONTINUED | OUTPATIENT
Start: 2025-08-02 | End: 2025-08-11

## 2025-08-02 RX ORDER — ONDANSETRON HCL/PF 4 MG/2 ML
4 VIAL (ML) INJECTION EVERY 8 HOURS
Refills: 0 | Status: DISCONTINUED | OUTPATIENT
Start: 2025-08-02 | End: 2025-08-11

## 2025-08-02 RX ORDER — ISOSORBIDE MONONITRATE 60 MG/1
30 TABLET, EXTENDED RELEASE ORAL AT BEDTIME
Refills: 0 | Status: DISCONTINUED | OUTPATIENT
Start: 2025-08-02 | End: 2025-08-11

## 2025-08-02 RX ADMIN — SEVELAMER HYDROCHLORIDE 800 MILLIGRAM(S): 800 TABLET ORAL at 08:59

## 2025-08-02 RX ADMIN — ISOSORBIDE MONONITRATE 30 MILLIGRAM(S): 60 TABLET, EXTENDED RELEASE ORAL at 22:05

## 2025-08-02 RX ADMIN — SEVELAMER HYDROCHLORIDE 800 MILLIGRAM(S): 800 TABLET ORAL at 16:24

## 2025-08-02 RX ADMIN — AMIODARONE HYDROCHLORIDE 200 MILLIGRAM(S): 50 INJECTION, SOLUTION INTRAVENOUS at 00:54

## 2025-08-02 RX ADMIN — Medication 3 MILLIGRAM(S): at 00:54

## 2025-08-02 RX ADMIN — EPOETIN ALFA 10000 UNIT(S): 10000 SOLUTION INTRAVENOUS; SUBCUTANEOUS at 12:47

## 2025-08-02 RX ADMIN — ATORVASTATIN CALCIUM 10 MILLIGRAM(S): 80 TABLET, FILM COATED ORAL at 22:04

## 2025-08-02 RX ADMIN — ATORVASTATIN CALCIUM 10 MILLIGRAM(S): 80 TABLET, FILM COATED ORAL at 00:54

## 2025-08-02 RX ADMIN — ISOSORBIDE MONONITRATE 30 MILLIGRAM(S): 60 TABLET, EXTENDED RELEASE ORAL at 00:54

## 2025-08-02 RX ADMIN — Medication 40 MILLIGRAM(S): at 04:52

## 2025-08-02 RX ADMIN — AMIODARONE HYDROCHLORIDE 200 MILLIGRAM(S): 50 INJECTION, SOLUTION INTRAVENOUS at 22:04

## 2025-08-03 LAB
ALBUMIN SERPL ELPH-MCNC: 3.3 G/DL — SIGNIFICANT CHANGE UP (ref 3.3–5)
ALP SERPL-CCNC: 150 U/L — HIGH (ref 40–120)
ALT FLD-CCNC: <5 U/L — LOW (ref 10–45)
ANION GAP SERPL CALC-SCNC: 15 MMOL/L — SIGNIFICANT CHANGE UP (ref 5–17)
AST SERPL-CCNC: 13 U/L — SIGNIFICANT CHANGE UP (ref 10–40)
BILIRUB SERPL-MCNC: 0.6 MG/DL — SIGNIFICANT CHANGE UP (ref 0.2–1.2)
BUN SERPL-MCNC: 21 MG/DL — SIGNIFICANT CHANGE UP (ref 7–23)
CALCIUM SERPL-MCNC: 8.5 MG/DL — SIGNIFICANT CHANGE UP (ref 8.4–10.5)
CHLORIDE SERPL-SCNC: 97 MMOL/L — SIGNIFICANT CHANGE UP (ref 96–108)
CO2 SERPL-SCNC: 26 MMOL/L — SIGNIFICANT CHANGE UP (ref 22–31)
CREAT SERPL-MCNC: 4.65 MG/DL — HIGH (ref 0.5–1.3)
EGFR: 14 ML/MIN/1.73M2 — LOW
EGFR: 14 ML/MIN/1.73M2 — LOW
GLUCOSE SERPL-MCNC: 75 MG/DL — SIGNIFICANT CHANGE UP (ref 70–99)
HCT VFR BLD CALC: 26.7 % — LOW (ref 39–50)
HGB BLD-MCNC: 8 G/DL — LOW (ref 13–17)
IMMATURE PLATELET FRACTION #: 2.1 K/UL — LOW (ref 3.9–12.5)
IMMATURE PLATELET FRACTION %: 2.4 % — SIGNIFICANT CHANGE UP (ref 1.6–7.1)
MCHC RBC-ENTMCNC: 28.4 PG — SIGNIFICANT CHANGE UP (ref 27–34)
MCHC RBC-ENTMCNC: 30 G/DL — LOW (ref 32–36)
MCV RBC AUTO: 94.7 FL — SIGNIFICANT CHANGE UP (ref 80–100)
NRBC # BLD AUTO: 0 K/UL — SIGNIFICANT CHANGE UP (ref 0–0)
NRBC # FLD: 0 K/UL — SIGNIFICANT CHANGE UP (ref 0–0)
NRBC BLD AUTO-RTO: 0 /100 WBCS — SIGNIFICANT CHANGE UP (ref 0–0)
PLATELET # BLD AUTO: 89 K/UL — LOW (ref 150–400)
PMV BLD: 9.9 FL — SIGNIFICANT CHANGE UP (ref 7–13)
POTASSIUM SERPL-MCNC: 4.4 MMOL/L — SIGNIFICANT CHANGE UP (ref 3.5–5.3)
POTASSIUM SERPL-SCNC: 4.4 MMOL/L — SIGNIFICANT CHANGE UP (ref 3.5–5.3)
PROT SERPL-MCNC: 6.5 G/DL — SIGNIFICANT CHANGE UP (ref 6–8.3)
RBC # BLD: 2.82 M/UL — LOW (ref 4.2–5.8)
RBC # FLD: 16.7 % — HIGH (ref 10.3–14.5)
SODIUM SERPL-SCNC: 138 MMOL/L — SIGNIFICANT CHANGE UP (ref 135–145)
WBC # BLD: 2.06 K/UL — LOW (ref 3.8–10.5)
WBC # FLD AUTO: 2.06 K/UL — LOW (ref 3.8–10.5)

## 2025-08-03 RX ORDER — POLYETHYLENE GLYCOL 3350 17 G/17G
17 POWDER, FOR SOLUTION ORAL DAILY
Refills: 0 | Status: DISCONTINUED | OUTPATIENT
Start: 2025-08-03 | End: 2025-08-11

## 2025-08-03 RX ORDER — SENNA 187 MG
2 TABLET ORAL AT BEDTIME
Refills: 0 | Status: DISCONTINUED | OUTPATIENT
Start: 2025-08-03 | End: 2025-08-11

## 2025-08-03 RX ORDER — ASPIRIN 325 MG
81 TABLET ORAL DAILY
Refills: 0 | Status: DISCONTINUED | OUTPATIENT
Start: 2025-08-03 | End: 2025-08-11

## 2025-08-03 RX ADMIN — ATORVASTATIN CALCIUM 10 MILLIGRAM(S): 80 TABLET, FILM COATED ORAL at 21:14

## 2025-08-03 RX ADMIN — Medication 40 MILLIGRAM(S): at 05:11

## 2025-08-03 RX ADMIN — POLYETHYLENE GLYCOL 3350 17 GRAM(S): 17 POWDER, FOR SOLUTION ORAL at 17:24

## 2025-08-03 RX ADMIN — ISOSORBIDE MONONITRATE 30 MILLIGRAM(S): 60 TABLET, EXTENDED RELEASE ORAL at 21:14

## 2025-08-03 RX ADMIN — AMIODARONE HYDROCHLORIDE 200 MILLIGRAM(S): 50 INJECTION, SOLUTION INTRAVENOUS at 21:14

## 2025-08-03 RX ADMIN — Medication 2 TABLET(S): at 21:14

## 2025-08-03 RX ADMIN — SEVELAMER HYDROCHLORIDE 800 MILLIGRAM(S): 800 TABLET ORAL at 17:24

## 2025-08-03 RX ADMIN — SEVELAMER HYDROCHLORIDE 800 MILLIGRAM(S): 800 TABLET ORAL at 08:37

## 2025-08-03 RX ADMIN — SEVELAMER HYDROCHLORIDE 800 MILLIGRAM(S): 800 TABLET ORAL at 12:47

## 2025-08-04 LAB
ALBUMIN SERPL ELPH-MCNC: 3.2 G/DL — LOW (ref 3.3–5)
ALP SERPL-CCNC: 148 U/L — HIGH (ref 40–120)
ALT FLD-CCNC: <5 U/L — LOW (ref 10–45)
ANION GAP SERPL CALC-SCNC: 17 MMOL/L — SIGNIFICANT CHANGE UP (ref 5–17)
AST SERPL-CCNC: 11 U/L — SIGNIFICANT CHANGE UP (ref 10–40)
BILIRUB SERPL-MCNC: 0.6 MG/DL — SIGNIFICANT CHANGE UP (ref 0.2–1.2)
BUN SERPL-MCNC: 40 MG/DL — HIGH (ref 7–23)
CALCIUM SERPL-MCNC: 8.6 MG/DL — SIGNIFICANT CHANGE UP (ref 8.4–10.5)
CHLORIDE SERPL-SCNC: 97 MMOL/L — SIGNIFICANT CHANGE UP (ref 96–108)
CO2 SERPL-SCNC: 24 MMOL/L — SIGNIFICANT CHANGE UP (ref 22–31)
CREAT SERPL-MCNC: 6.82 MG/DL — HIGH (ref 0.5–1.3)
EGFR: 9 ML/MIN/1.73M2 — LOW
EGFR: 9 ML/MIN/1.73M2 — LOW
GLUCOSE SERPL-MCNC: 69 MG/DL — LOW (ref 70–99)
HCT VFR BLD CALC: 26.9 % — LOW (ref 39–50)
HGB BLD-MCNC: 8.3 G/DL — LOW (ref 13–17)
IMMATURE PLATELET FRACTION #: 1.6 K/UL — LOW (ref 3.9–12.5)
IMMATURE PLATELET FRACTION %: 1.8 % — SIGNIFICANT CHANGE UP (ref 1.6–7.1)
MCHC RBC-ENTMCNC: 29.2 PG — SIGNIFICANT CHANGE UP (ref 27–34)
MCHC RBC-ENTMCNC: 30.9 G/DL — LOW (ref 32–36)
MCV RBC AUTO: 94.7 FL — SIGNIFICANT CHANGE UP (ref 80–100)
NRBC # BLD AUTO: 0 K/UL — SIGNIFICANT CHANGE UP (ref 0–0)
NRBC # FLD: 0 K/UL — SIGNIFICANT CHANGE UP (ref 0–0)
NRBC BLD AUTO-RTO: 0 /100 WBCS — SIGNIFICANT CHANGE UP (ref 0–0)
PLATELET # BLD AUTO: 88 K/UL — LOW (ref 150–400)
PMV BLD: 9.9 FL — SIGNIFICANT CHANGE UP (ref 7–13)
POTASSIUM SERPL-MCNC: 4.6 MMOL/L — SIGNIFICANT CHANGE UP (ref 3.5–5.3)
POTASSIUM SERPL-SCNC: 4.6 MMOL/L — SIGNIFICANT CHANGE UP (ref 3.5–5.3)
PROT SERPL-MCNC: 6.3 G/DL — SIGNIFICANT CHANGE UP (ref 6–8.3)
RBC # BLD: 2.84 M/UL — LOW (ref 4.2–5.8)
RBC # FLD: 16.6 % — HIGH (ref 10.3–14.5)
SODIUM SERPL-SCNC: 138 MMOL/L — SIGNIFICANT CHANGE UP (ref 135–145)
WBC # BLD: 2.39 K/UL — LOW (ref 3.8–10.5)
WBC # FLD AUTO: 2.39 K/UL — LOW (ref 3.8–10.5)

## 2025-08-04 PROCEDURE — 99233 SBSQ HOSP IP/OBS HIGH 50: CPT

## 2025-08-04 RX ORDER — POLYETHYLENE GLYCOL-3350 AND ELECTROLYTES 236; 6.74; 5.86; 2.97; 22.74 G/274.31G; G/274.31G; G/274.31G; G/274.31G; G/274.31G
2000 POWDER, FOR SOLUTION ORAL ONCE
Refills: 0 | Status: COMPLETED | OUTPATIENT
Start: 2025-08-04 | End: 2025-08-04

## 2025-08-04 RX ADMIN — SEVELAMER HYDROCHLORIDE 800 MILLIGRAM(S): 800 TABLET ORAL at 11:00

## 2025-08-04 RX ADMIN — SEVELAMER HYDROCHLORIDE 800 MILLIGRAM(S): 800 TABLET ORAL at 13:06

## 2025-08-04 RX ADMIN — Medication 40 MILLIGRAM(S): at 05:17

## 2025-08-04 RX ADMIN — POLYETHYLENE GLYCOL 3350 17 GRAM(S): 17 POWDER, FOR SOLUTION ORAL at 13:06

## 2025-08-04 RX ADMIN — Medication 81 MILLIGRAM(S): at 11:00

## 2025-08-04 RX ADMIN — POLYETHYLENE GLYCOL-3350 AND ELECTROLYTES 2000 MILLILITER(S): 236; 6.74; 5.86; 2.97; 22.74 POWDER, FOR SOLUTION ORAL at 18:03

## 2025-08-04 RX ADMIN — ISOSORBIDE MONONITRATE 30 MILLIGRAM(S): 60 TABLET, EXTENDED RELEASE ORAL at 21:43

## 2025-08-04 RX ADMIN — SEVELAMER HYDROCHLORIDE 800 MILLIGRAM(S): 800 TABLET ORAL at 17:13

## 2025-08-04 RX ADMIN — AMIODARONE HYDROCHLORIDE 200 MILLIGRAM(S): 50 INJECTION, SOLUTION INTRAVENOUS at 21:44

## 2025-08-04 RX ADMIN — ATORVASTATIN CALCIUM 10 MILLIGRAM(S): 80 TABLET, FILM COATED ORAL at 21:44

## 2025-08-05 LAB
ADD ON TEST-SPECIMEN IN LAB: SIGNIFICANT CHANGE UP
ANION GAP SERPL CALC-SCNC: 19 MMOL/L — HIGH (ref 5–17)
BLD GP AB SCN SERPL QL: NEGATIVE — SIGNIFICANT CHANGE UP
BUN SERPL-MCNC: 52 MG/DL — HIGH (ref 7–23)
CALCIUM SERPL-MCNC: 8.8 MG/DL — SIGNIFICANT CHANGE UP (ref 8.4–10.5)
CHLORIDE SERPL-SCNC: 95 MMOL/L — LOW (ref 96–108)
CO2 SERPL-SCNC: 22 MMOL/L — SIGNIFICANT CHANGE UP (ref 22–31)
CREAT SERPL-MCNC: 7.93 MG/DL — HIGH (ref 0.5–1.3)
EGFR: 7 ML/MIN/1.73M2 — LOW
EGFR: 7 ML/MIN/1.73M2 — LOW
GLUCOSE SERPL-MCNC: 58 MG/DL — LOW (ref 70–99)
HCT VFR BLD CALC: 29.4 % — LOW (ref 39–50)
HGB BLD-MCNC: 9.2 G/DL — LOW (ref 13–17)
MCHC RBC-ENTMCNC: 29.6 PG — SIGNIFICANT CHANGE UP (ref 27–34)
MCHC RBC-ENTMCNC: 31.3 G/DL — LOW (ref 32–36)
MCV RBC AUTO: 94.5 FL — SIGNIFICANT CHANGE UP (ref 80–100)
MRSA PCR RESULT.: SIGNIFICANT CHANGE UP
NRBC # BLD AUTO: 0 K/UL — SIGNIFICANT CHANGE UP (ref 0–0)
NRBC # FLD: 0 K/UL — SIGNIFICANT CHANGE UP (ref 0–0)
NRBC BLD AUTO-RTO: 0 /100 WBCS — SIGNIFICANT CHANGE UP (ref 0–0)
PLATELET # BLD AUTO: 117 K/UL — LOW (ref 150–400)
PMV BLD: 10.8 FL — SIGNIFICANT CHANGE UP (ref 7–13)
POTASSIUM SERPL-MCNC: 5.1 MMOL/L — SIGNIFICANT CHANGE UP (ref 3.5–5.3)
POTASSIUM SERPL-SCNC: 5.1 MMOL/L — SIGNIFICANT CHANGE UP (ref 3.5–5.3)
RBC # BLD: 3.11 M/UL — LOW (ref 4.2–5.8)
RBC # FLD: 16.9 % — HIGH (ref 10.3–14.5)
RH IG SCN BLD-IMP: POSITIVE — SIGNIFICANT CHANGE UP
S AUREUS DNA NOSE QL NAA+PROBE: SIGNIFICANT CHANGE UP
SODIUM SERPL-SCNC: 136 MMOL/L — SIGNIFICANT CHANGE UP (ref 135–145)
WBC # BLD: 2.9 K/UL — LOW (ref 3.8–10.5)
WBC # FLD AUTO: 2.9 K/UL — LOW (ref 3.8–10.5)

## 2025-08-05 PROCEDURE — 85014 HEMATOCRIT: CPT

## 2025-08-05 PROCEDURE — 86901 BLOOD TYPING SEROLOGIC RH(D): CPT

## 2025-08-05 PROCEDURE — 85018 HEMOGLOBIN: CPT

## 2025-08-05 PROCEDURE — 84100 ASSAY OF PHOSPHORUS: CPT

## 2025-08-05 PROCEDURE — 83735 ASSAY OF MAGNESIUM: CPT

## 2025-08-05 PROCEDURE — 83605 ASSAY OF LACTIC ACID: CPT

## 2025-08-05 PROCEDURE — 93005 ELECTROCARDIOGRAM TRACING: CPT

## 2025-08-05 PROCEDURE — 84484 ASSAY OF TROPONIN QUANT: CPT

## 2025-08-05 PROCEDURE — 86900 BLOOD TYPING SEROLOGIC ABO: CPT

## 2025-08-05 PROCEDURE — 82947 ASSAY GLUCOSE BLOOD QUANT: CPT

## 2025-08-05 PROCEDURE — 86850 RBC ANTIBODY SCREEN: CPT

## 2025-08-05 PROCEDURE — 71045 X-RAY EXAM CHEST 1 VIEW: CPT

## 2025-08-05 PROCEDURE — 82803 BLOOD GASES ANY COMBINATION: CPT

## 2025-08-05 PROCEDURE — 82272 OCCULT BLD FECES 1-3 TESTS: CPT

## 2025-08-05 PROCEDURE — 36415 COLL VENOUS BLD VENIPUNCTURE: CPT

## 2025-08-05 PROCEDURE — 80048 BASIC METABOLIC PNL TOTAL CA: CPT

## 2025-08-05 PROCEDURE — 82330 ASSAY OF CALCIUM: CPT

## 2025-08-05 PROCEDURE — 87641 MR-STAPH DNA AMP PROBE: CPT

## 2025-08-05 PROCEDURE — 99221 1ST HOSP IP/OBS SF/LOW 40: CPT

## 2025-08-05 PROCEDURE — 83880 ASSAY OF NATRIURETIC PEPTIDE: CPT

## 2025-08-05 PROCEDURE — 84132 ASSAY OF SERUM POTASSIUM: CPT

## 2025-08-05 PROCEDURE — 71046 X-RAY EXAM CHEST 2 VIEWS: CPT

## 2025-08-05 PROCEDURE — 82435 ASSAY OF BLOOD CHLORIDE: CPT

## 2025-08-05 PROCEDURE — 85025 COMPLETE CBC W/AUTO DIFF WBC: CPT

## 2025-08-05 PROCEDURE — 84295 ASSAY OF SERUM SODIUM: CPT

## 2025-08-05 PROCEDURE — 80053 COMPREHEN METABOLIC PANEL: CPT

## 2025-08-05 PROCEDURE — 85027 COMPLETE CBC AUTOMATED: CPT

## 2025-08-05 PROCEDURE — 87640 STAPH A DNA AMP PROBE: CPT

## 2025-08-05 RX ORDER — POLYETHYLENE GLYCOL-3350 AND ELECTROLYTES 236; 6.74; 5.86; 2.97; 22.74 G/274.31G; G/274.31G; G/274.31G; G/274.31G; G/274.31G
4000 POWDER, FOR SOLUTION ORAL ONCE
Refills: 0 | Status: COMPLETED | OUTPATIENT
Start: 2025-08-05 | End: 2025-08-05

## 2025-08-05 RX ADMIN — ATORVASTATIN CALCIUM 10 MILLIGRAM(S): 80 TABLET, FILM COATED ORAL at 22:01

## 2025-08-05 RX ADMIN — AMIODARONE HYDROCHLORIDE 200 MILLIGRAM(S): 50 INJECTION, SOLUTION INTRAVENOUS at 22:01

## 2025-08-05 RX ADMIN — Medication 1 APPLICATION(S): at 14:05

## 2025-08-05 RX ADMIN — Medication 40 MILLIGRAM(S): at 04:19

## 2025-08-05 RX ADMIN — POLYETHYLENE GLYCOL-3350 AND ELECTROLYTES 4000 MILLILITER(S): 236; 6.74; 5.86; 2.97; 22.74 POWDER, FOR SOLUTION ORAL at 18:36

## 2025-08-05 RX ADMIN — Medication 81 MILLIGRAM(S): at 14:04

## 2025-08-05 RX ADMIN — EPOETIN ALFA 10000 UNIT(S): 10000 SOLUTION INTRAVENOUS; SUBCUTANEOUS at 08:36

## 2025-08-05 RX ADMIN — ISOSORBIDE MONONITRATE 30 MILLIGRAM(S): 60 TABLET, EXTENDED RELEASE ORAL at 22:01

## 2025-08-06 ENCOUNTER — TRANSCRIPTION ENCOUNTER (OUTPATIENT)
Age: 60
End: 2025-08-06

## 2025-08-06 LAB
ANION GAP SERPL CALC-SCNC: 18 MMOL/L — HIGH (ref 5–17)
APTT BLD: 33.5 SEC — SIGNIFICANT CHANGE UP (ref 26.1–36.8)
BILIRUB DIRECT SERPL-MCNC: 0.3 MG/DL — SIGNIFICANT CHANGE UP (ref 0–0.3)
BILIRUB INDIRECT FLD-MCNC: 0.5 MG/DL — SIGNIFICANT CHANGE UP (ref 0.2–1)
BILIRUB SERPL-MCNC: 0.8 MG/DL — SIGNIFICANT CHANGE UP (ref 0.2–1.2)
BLD GP AB SCN SERPL QL: NEGATIVE — SIGNIFICANT CHANGE UP
BUN SERPL-MCNC: 27 MG/DL — HIGH (ref 7–23)
CALCIUM SERPL-MCNC: 9.2 MG/DL — SIGNIFICANT CHANGE UP (ref 8.4–10.5)
CHLORIDE SERPL-SCNC: 96 MMOL/L — SIGNIFICANT CHANGE UP (ref 96–108)
CMV DNA CSF QL NAA+PROBE: SIGNIFICANT CHANGE UP IU/ML
CMV DNA SPEC NAA+PROBE-LOG#: SIGNIFICANT CHANGE UP LOG10IU/ML
CO2 SERPL-SCNC: 24 MMOL/L — SIGNIFICANT CHANGE UP (ref 22–31)
CREAT SERPL-MCNC: 5.34 MG/DL — HIGH (ref 0.5–1.3)
D DIMER BLD IA.RAPID-MCNC: 1091 NG/ML DDU — HIGH
EBV DNA SERPL NAA+PROBE-ACNC: SIGNIFICANT CHANGE UP IU/ML
EBVPCR LOG: SIGNIFICANT CHANGE UP LOG10IU/ML
EGFR: 11 ML/MIN/1.73M2 — LOW
EGFR: 11 ML/MIN/1.73M2 — LOW
FERRITIN SERPL-MCNC: 497 NG/ML — HIGH (ref 30–400)
FIBRINOGEN PPP-MCNC: 389 MG/DL — SIGNIFICANT CHANGE UP (ref 200–445)
FOLATE SERPL-MCNC: 9 NG/ML — SIGNIFICANT CHANGE UP
GLUCOSE BLDC GLUCOMTR-MCNC: 105 MG/DL — HIGH (ref 70–99)
GLUCOSE BLDC GLUCOMTR-MCNC: 58 MG/DL — LOW (ref 70–99)
GLUCOSE BLDC GLUCOMTR-MCNC: 62 MG/DL — LOW (ref 70–99)
GLUCOSE BLDC GLUCOMTR-MCNC: 71 MG/DL — SIGNIFICANT CHANGE UP (ref 70–99)
GLUCOSE BLDC GLUCOMTR-MCNC: 78 MG/DL — SIGNIFICANT CHANGE UP (ref 70–99)
GLUCOSE BLDC GLUCOMTR-MCNC: 85 MG/DL — SIGNIFICANT CHANGE UP (ref 70–99)
GLUCOSE BLDC GLUCOMTR-MCNC: 86 MG/DL — SIGNIFICANT CHANGE UP (ref 70–99)
GLUCOSE BLDC GLUCOMTR-MCNC: 95 MG/DL — SIGNIFICANT CHANGE UP (ref 70–99)
GLUCOSE SERPL-MCNC: 58 MG/DL — LOW (ref 70–99)
HAPTOGLOB SERPL-MCNC: 45 MG/DL — SIGNIFICANT CHANGE UP (ref 34–200)
HAV IGM SER-ACNC: SIGNIFICANT CHANGE UP
HBV CORE AB SER-ACNC: SIGNIFICANT CHANGE UP
HBV CORE IGM SER-ACNC: SIGNIFICANT CHANGE UP
HBV SURFACE AG SER-ACNC: SIGNIFICANT CHANGE UP
HCT VFR BLD CALC: 28.3 % — LOW (ref 39–50)
HCV AB S/CO SERPL IA: 17.44 S/CO — HIGH (ref 0–0.79)
HCV AB SERPL-IMP: REACTIVE
HCV RNA FLD QL NAA+PROBE: SIGNIFICANT CHANGE UP
HCV RNA SERPL NAA DL=5-ACNC: SIGNIFICANT CHANGE UP IU/ML
HCV RNA SPEC NAA+PROBE-LOG IU: SIGNIFICANT CHANGE UP
HCV RNA SPEC NAA+PROBE-LOG IU: SIGNIFICANT CHANGE UP LOGIU/ML
HCV RNA SPEC QL PROBE+SIG AMP: SIGNIFICANT CHANGE UP
HGB BLD-MCNC: 8.7 G/DL — LOW (ref 13–17)
HIV 1+2 AB+HIV1 P24 AG SERPL QL IA: SIGNIFICANT CHANGE UP
IMMATURE PLATELET FRACTION #: 1.7 K/UL — LOW (ref 3.9–12.5)
IMMATURE PLATELET FRACTION %: 2.1 % — SIGNIFICANT CHANGE UP (ref 1.6–7.1)
IMMATURE RETICULOCYTE FRACTION %: 17.8 % — SIGNIFICANT CHANGE UP
INR BLD: 1.12 RATIO — SIGNIFICANT CHANGE UP (ref 0.85–1.16)
IRON SATN MFR SERPL: 20 % — SIGNIFICANT CHANGE UP (ref 16–55)
IRON SATN MFR SERPL: 39 UG/DL — LOW (ref 45–165)
LDH SERPL L TO P-CCNC: 159 U/L — SIGNIFICANT CHANGE UP (ref 50–242)
MCHC RBC-ENTMCNC: 29.5 PG — SIGNIFICANT CHANGE UP (ref 27–34)
MCHC RBC-ENTMCNC: 30.7 G/DL — LOW (ref 32–36)
MCV RBC AUTO: 95.9 FL — SIGNIFICANT CHANGE UP (ref 80–100)
NRBC # BLD AUTO: 0 K/UL — SIGNIFICANT CHANGE UP (ref 0–0)
NRBC # FLD: 0 K/UL — SIGNIFICANT CHANGE UP (ref 0–0)
NRBC BLD AUTO-RTO: 0 /100 WBCS — SIGNIFICANT CHANGE UP (ref 0–0)
PLATELET # BLD AUTO: 79 K/UL — LOW (ref 150–400)
PMV BLD: 9.9 FL — SIGNIFICANT CHANGE UP (ref 7–13)
POTASSIUM SERPL-MCNC: 4.3 MMOL/L — SIGNIFICANT CHANGE UP (ref 3.5–5.3)
POTASSIUM SERPL-SCNC: 4.3 MMOL/L — SIGNIFICANT CHANGE UP (ref 3.5–5.3)
PROTHROM AB SERPL-ACNC: 12.9 SEC — SIGNIFICANT CHANGE UP (ref 9.9–13.4)
RBC # BLD: 2.95 M/UL — LOW (ref 4.2–5.8)
RBC # BLD: 2.95 M/UL — LOW (ref 4.2–5.8)
RBC # FLD: 17.2 % — HIGH (ref 10.3–14.5)
RETICS #: 111.8 K/UL — SIGNIFICANT CHANGE UP (ref 25–125)
RETICS/RBC NFR: 3.8 % — HIGH (ref 0.5–2.5)
RETICULOCYTE HEMOGLOBIN EQUIVALENT: 32.3 PG — LOW (ref 36–38.6)
RH IG SCN BLD-IMP: POSITIVE — SIGNIFICANT CHANGE UP
SODIUM SERPL-SCNC: 138 MMOL/L — SIGNIFICANT CHANGE UP (ref 135–145)
TIBC SERPL-MCNC: 194 UG/DL — LOW (ref 220–430)
TSH SERPL-MCNC: 3.18 UIU/ML — SIGNIFICANT CHANGE UP (ref 0.27–4.2)
UIBC SERPL-MCNC: 154 UG/DL — SIGNIFICANT CHANGE UP (ref 110–370)
VIT B12 SERPL-MCNC: 296 PG/ML — SIGNIFICANT CHANGE UP (ref 232–1245)
WBC # BLD: 1.57 K/UL — LOW (ref 3.8–10.5)
WBC # FLD AUTO: 1.57 K/UL — LOW (ref 3.8–10.5)

## 2025-08-06 PROCEDURE — 99222 1ST HOSP IP/OBS MODERATE 55: CPT | Mod: GC

## 2025-08-06 PROCEDURE — 43235 EGD DIAGNOSTIC BRUSH WASH: CPT | Mod: GC

## 2025-08-06 PROCEDURE — 45378 DIAGNOSTIC COLONOSCOPY: CPT | Mod: GC

## 2025-08-06 DEVICE — NET RETRV ROT ROTH 2.5MMX230CM: Type: IMPLANTABLE DEVICE | Status: FUNCTIONAL

## 2025-08-06 RX ORDER — DEXTROSE 50 % IN WATER 50 %
25 SYRINGE (ML) INTRAVENOUS ONCE
Refills: 0 | Status: COMPLETED | OUTPATIENT
Start: 2025-08-06 | End: 2025-08-06

## 2025-08-06 RX ADMIN — Medication 25 MILLILITER(S): at 13:22

## 2025-08-06 RX ADMIN — ISOSORBIDE MONONITRATE 30 MILLIGRAM(S): 60 TABLET, EXTENDED RELEASE ORAL at 21:16

## 2025-08-06 RX ADMIN — ATORVASTATIN CALCIUM 10 MILLIGRAM(S): 80 TABLET, FILM COATED ORAL at 21:16

## 2025-08-06 RX ADMIN — Medication 3 MILLIGRAM(S): at 23:47

## 2025-08-06 RX ADMIN — Medication 40 MILLIGRAM(S): at 05:36

## 2025-08-06 RX ADMIN — AMIODARONE HYDROCHLORIDE 200 MILLIGRAM(S): 50 INJECTION, SOLUTION INTRAVENOUS at 21:16

## 2025-08-06 RX ADMIN — SEVELAMER HYDROCHLORIDE 800 MILLIGRAM(S): 800 TABLET ORAL at 18:27

## 2025-08-06 RX ADMIN — Medication 81 MILLIGRAM(S): at 18:27

## 2025-08-06 RX ADMIN — Medication 25 MILLILITER(S): at 12:24

## 2025-08-06 RX ADMIN — Medication 1 APPLICATION(S): at 12:29

## 2025-08-07 LAB
ANION GAP SERPL CALC-SCNC: 20 MMOL/L — HIGH (ref 5–17)
BUN SERPL-MCNC: 41 MG/DL — HIGH (ref 7–23)
CALCIUM SERPL-MCNC: 8.7 MG/DL — SIGNIFICANT CHANGE UP (ref 8.4–10.5)
CHLORIDE SERPL-SCNC: 95 MMOL/L — LOW (ref 96–108)
CLOSURE TME COLL+EPINEP BLD: 67 K/UL — LOW (ref 150–400)
CO2 SERPL-SCNC: 22 MMOL/L — SIGNIFICANT CHANGE UP (ref 22–31)
CREAT SERPL-MCNC: 7.16 MG/DL — HIGH (ref 0.5–1.3)
EGFR: 8 ML/MIN/1.73M2 — LOW
EGFR: 8 ML/MIN/1.73M2 — LOW
EPO SERPL-MCNC: 111.4 MIU/ML — HIGH (ref 2.6–18.5)
GLUCOSE SERPL-MCNC: 79 MG/DL — SIGNIFICANT CHANGE UP (ref 70–99)
HCT VFR BLD CALC: 26.6 % — LOW (ref 39–50)
HGB BLD-MCNC: 8.3 G/DL — LOW (ref 13–17)
IMMATURE PLATELET FRACTION #: 1.4 K/UL — LOW (ref 3.9–12.5)
IMMATURE PLATELET FRACTION %: 2.4 % — SIGNIFICANT CHANGE UP (ref 1.6–7.1)
MCHC RBC-ENTMCNC: 29.4 PG — SIGNIFICANT CHANGE UP (ref 27–34)
MCHC RBC-ENTMCNC: 31.2 G/DL — LOW (ref 32–36)
MCV RBC AUTO: 94.3 FL — SIGNIFICANT CHANGE UP (ref 80–100)
NRBC # BLD AUTO: 0 K/UL — SIGNIFICANT CHANGE UP (ref 0–0)
NRBC # FLD: 0 K/UL — SIGNIFICANT CHANGE UP (ref 0–0)
NRBC BLD AUTO-RTO: 0 /100 WBCS — SIGNIFICANT CHANGE UP (ref 0–0)
PLATELET # BLD AUTO: 59 K/UL — LOW (ref 150–400)
PMV BLD: 11.6 FL — SIGNIFICANT CHANGE UP (ref 7–13)
POTASSIUM SERPL-MCNC: 4.9 MMOL/L — SIGNIFICANT CHANGE UP (ref 3.5–5.3)
POTASSIUM SERPL-SCNC: 4.9 MMOL/L — SIGNIFICANT CHANGE UP (ref 3.5–5.3)
RBC # BLD: 2.82 M/UL — LOW (ref 4.2–5.8)
RBC # FLD: 17 % — HIGH (ref 10.3–14.5)
SODIUM SERPL-SCNC: 137 MMOL/L — SIGNIFICANT CHANGE UP (ref 135–145)
WBC # BLD: 3.16 K/UL — LOW (ref 3.8–10.5)
WBC # FLD AUTO: 3.16 K/UL — LOW (ref 3.8–10.5)

## 2025-08-07 RX ORDER — CYANOCOBALAMIN 1000 UG/ML
1000 INJECTION INTRAMUSCULAR; SUBCUTANEOUS DAILY
Refills: 0 | Status: DISCONTINUED | OUTPATIENT
Start: 2025-08-07 | End: 2025-08-11

## 2025-08-07 RX ORDER — CYANOCOBALAMIN 1000 UG/ML
1000 INJECTION INTRAMUSCULAR; SUBCUTANEOUS DAILY
Refills: 0 | Status: DISCONTINUED | OUTPATIENT
Start: 2025-08-07 | End: 2025-08-07

## 2025-08-07 RX ORDER — APIXABAN 5 MG/1
5 TABLET, FILM COATED ORAL
Refills: 0 | Status: DISCONTINUED | OUTPATIENT
Start: 2025-08-07 | End: 2025-08-11

## 2025-08-07 RX ADMIN — SEVELAMER HYDROCHLORIDE 800 MILLIGRAM(S): 800 TABLET ORAL at 21:01

## 2025-08-07 RX ADMIN — ISOSORBIDE MONONITRATE 30 MILLIGRAM(S): 60 TABLET, EXTENDED RELEASE ORAL at 21:02

## 2025-08-07 RX ADMIN — SEVELAMER HYDROCHLORIDE 800 MILLIGRAM(S): 800 TABLET ORAL at 11:27

## 2025-08-07 RX ADMIN — ATORVASTATIN CALCIUM 10 MILLIGRAM(S): 80 TABLET, FILM COATED ORAL at 21:01

## 2025-08-07 RX ADMIN — Medication 81 MILLIGRAM(S): at 11:26

## 2025-08-07 RX ADMIN — Medication 40 MILLIGRAM(S): at 05:01

## 2025-08-07 RX ADMIN — Medication 1 LOZENGE: at 15:57

## 2025-08-07 RX ADMIN — AMIODARONE HYDROCHLORIDE 200 MILLIGRAM(S): 50 INJECTION, SOLUTION INTRAVENOUS at 21:01

## 2025-08-07 RX ADMIN — Medication 1 APPLICATION(S): at 11:27

## 2025-08-07 RX ADMIN — Medication 30 MILLILITER(S): at 01:23

## 2025-08-07 RX ADMIN — Medication 2 TABLET(S): at 21:02

## 2025-08-07 RX ADMIN — CYANOCOBALAMIN 1000 MICROGRAM(S): 1000 INJECTION INTRAMUSCULAR; SUBCUTANEOUS at 17:27

## 2025-08-07 RX ADMIN — EPOETIN ALFA 10000 UNIT(S): 10000 SOLUTION INTRAVENOUS; SUBCUTANEOUS at 08:21

## 2025-08-08 LAB
ANION GAP SERPL CALC-SCNC: 15 MMOL/L — SIGNIFICANT CHANGE UP (ref 5–17)
B19V IGG SER-ACNC: 2.71 INDEX — HIGH (ref 0–0.9)
B19V IGG+IGM SER-IMP: POSITIVE
B19V IGG+IGM SER-IMP: SIGNIFICANT CHANGE UP
B19V IGM FLD-ACNC: 0.14 INDEX — SIGNIFICANT CHANGE UP (ref 0–0.9)
B19V IGM SER-ACNC: NEGATIVE — SIGNIFICANT CHANGE UP
BUN SERPL-MCNC: 29 MG/DL — HIGH (ref 7–23)
CALCIUM SERPL-MCNC: 8.8 MG/DL — SIGNIFICANT CHANGE UP (ref 8.4–10.5)
CHLORIDE SERPL-SCNC: 98 MMOL/L — SIGNIFICANT CHANGE UP (ref 96–108)
CO2 SERPL-SCNC: 26 MMOL/L — SIGNIFICANT CHANGE UP (ref 22–31)
CREAT SERPL-MCNC: 5.36 MG/DL — HIGH (ref 0.5–1.3)
EGFR: 11 ML/MIN/1.73M2 — LOW
EGFR: 11 ML/MIN/1.73M2 — LOW
GLUCOSE SERPL-MCNC: 80 MG/DL — SIGNIFICANT CHANGE UP (ref 70–99)
HCT VFR BLD CALC: 28.1 % — LOW (ref 39–50)
HGB BLD-MCNC: 8.5 G/DL — LOW (ref 13–17)
IMMATURE PLATELET FRACTION #: 1.8 K/UL — LOW (ref 3.9–12.5)
IMMATURE PLATELET FRACTION %: 2.3 % — SIGNIFICANT CHANGE UP (ref 1.6–7.1)
MCHC RBC-ENTMCNC: 29 PG — SIGNIFICANT CHANGE UP (ref 27–34)
MCHC RBC-ENTMCNC: 30.2 G/DL — LOW (ref 32–36)
MCV RBC AUTO: 95.9 FL — SIGNIFICANT CHANGE UP (ref 80–100)
NRBC # BLD AUTO: 0 K/UL — SIGNIFICANT CHANGE UP (ref 0–0)
NRBC # FLD: 0 K/UL — SIGNIFICANT CHANGE UP (ref 0–0)
NRBC BLD AUTO-RTO: 0 /100 WBCS — SIGNIFICANT CHANGE UP (ref 0–0)
PLATELET # BLD AUTO: 79 K/UL — LOW (ref 150–400)
PMV BLD: 10.2 FL — SIGNIFICANT CHANGE UP (ref 7–13)
POTASSIUM SERPL-MCNC: 4.1 MMOL/L — SIGNIFICANT CHANGE UP (ref 3.5–5.3)
POTASSIUM SERPL-SCNC: 4.1 MMOL/L — SIGNIFICANT CHANGE UP (ref 3.5–5.3)
RBC # BLD: 2.93 M/UL — LOW (ref 4.2–5.8)
RBC # FLD: 16.8 % — HIGH (ref 10.3–14.5)
SODIUM SERPL-SCNC: 139 MMOL/L — SIGNIFICANT CHANGE UP (ref 135–145)
WBC # BLD: 1.87 K/UL — LOW (ref 3.8–10.5)
WBC # FLD AUTO: 1.87 K/UL — LOW (ref 3.8–10.5)

## 2025-08-08 PROCEDURE — 74181 MRI ABDOMEN W/O CONTRAST: CPT | Mod: 26

## 2025-08-08 RX ADMIN — SEVELAMER HYDROCHLORIDE 800 MILLIGRAM(S): 800 TABLET ORAL at 08:59

## 2025-08-08 RX ADMIN — Medication 81 MILLIGRAM(S): at 13:11

## 2025-08-08 RX ADMIN — CYANOCOBALAMIN 1000 MICROGRAM(S): 1000 INJECTION INTRAMUSCULAR; SUBCUTANEOUS at 13:11

## 2025-08-08 RX ADMIN — SEVELAMER HYDROCHLORIDE 800 MILLIGRAM(S): 800 TABLET ORAL at 17:35

## 2025-08-08 RX ADMIN — APIXABAN 5 MILLIGRAM(S): 5 TABLET, FILM COATED ORAL at 17:36

## 2025-08-08 RX ADMIN — Medication 40 MILLIGRAM(S): at 06:08

## 2025-08-08 RX ADMIN — ATORVASTATIN CALCIUM 10 MILLIGRAM(S): 80 TABLET, FILM COATED ORAL at 21:10

## 2025-08-08 RX ADMIN — Medication 2 TABLET(S): at 21:09

## 2025-08-08 RX ADMIN — ISOSORBIDE MONONITRATE 30 MILLIGRAM(S): 60 TABLET, EXTENDED RELEASE ORAL at 21:10

## 2025-08-08 RX ADMIN — APIXABAN 5 MILLIGRAM(S): 5 TABLET, FILM COATED ORAL at 06:08

## 2025-08-08 RX ADMIN — Medication 1 APPLICATION(S): at 13:14

## 2025-08-08 RX ADMIN — AMIODARONE HYDROCHLORIDE 200 MILLIGRAM(S): 50 INJECTION, SOLUTION INTRAVENOUS at 21:10

## 2025-08-08 RX ADMIN — POLYETHYLENE GLYCOL 3350 17 GRAM(S): 17 POWDER, FOR SOLUTION ORAL at 13:10

## 2025-08-09 LAB
ANION GAP SERPL CALC-SCNC: 20 MMOL/L — HIGH (ref 5–17)
BUN SERPL-MCNC: 51 MG/DL — HIGH (ref 7–23)
CALCIUM SERPL-MCNC: 8.7 MG/DL — SIGNIFICANT CHANGE UP (ref 8.4–10.5)
CHLORIDE SERPL-SCNC: 97 MMOL/L — SIGNIFICANT CHANGE UP (ref 96–108)
CO2 SERPL-SCNC: 21 MMOL/L — LOW (ref 22–31)
CREAT SERPL-MCNC: 7.78 MG/DL — HIGH (ref 0.5–1.3)
EGFR: 7 ML/MIN/1.73M2 — LOW
EGFR: 7 ML/MIN/1.73M2 — LOW
GLUCOSE SERPL-MCNC: 109 MG/DL — HIGH (ref 70–99)
HCT VFR BLD CALC: 27.1 % — LOW (ref 39–50)
HGB BLD-MCNC: 8.4 G/DL — LOW (ref 13–17)
IMMATURE PLATELET FRACTION #: 1.6 K/UL — LOW (ref 3.9–12.5)
IMMATURE PLATELET FRACTION %: 1.8 % — SIGNIFICANT CHANGE UP (ref 1.6–7.1)
MAGNESIUM SERPL-MCNC: 2 MG/DL — SIGNIFICANT CHANGE UP (ref 1.6–2.6)
MCHC RBC-ENTMCNC: 29.2 PG — SIGNIFICANT CHANGE UP (ref 27–34)
MCHC RBC-ENTMCNC: 31 G/DL — LOW (ref 32–36)
MCV RBC AUTO: 94.1 FL — SIGNIFICANT CHANGE UP (ref 80–100)
NRBC # BLD AUTO: 0 K/UL — SIGNIFICANT CHANGE UP (ref 0–0)
NRBC # FLD: 0 K/UL — SIGNIFICANT CHANGE UP (ref 0–0)
NRBC BLD AUTO-RTO: 0 /100 WBCS — SIGNIFICANT CHANGE UP (ref 0–0)
PHOSPHATE SERPL-MCNC: 4.9 MG/DL — HIGH (ref 2.5–4.5)
PLATELET # BLD AUTO: 87 K/UL — LOW (ref 150–400)
PMV BLD: 9.8 FL — SIGNIFICANT CHANGE UP (ref 7–13)
POTASSIUM SERPL-MCNC: 4 MMOL/L — SIGNIFICANT CHANGE UP (ref 3.5–5.3)
POTASSIUM SERPL-SCNC: 4 MMOL/L — SIGNIFICANT CHANGE UP (ref 3.5–5.3)
RBC # BLD: 2.88 M/UL — LOW (ref 4.2–5.8)
RBC # FLD: 16.6 % — HIGH (ref 10.3–14.5)
SODIUM SERPL-SCNC: 138 MMOL/L — SIGNIFICANT CHANGE UP (ref 135–145)
WBC # BLD: 2.46 K/UL — LOW (ref 3.8–10.5)
WBC # FLD AUTO: 2.46 K/UL — LOW (ref 3.8–10.5)

## 2025-08-09 RX ADMIN — APIXABAN 5 MILLIGRAM(S): 5 TABLET, FILM COATED ORAL at 06:01

## 2025-08-09 RX ADMIN — ISOSORBIDE MONONITRATE 30 MILLIGRAM(S): 60 TABLET, EXTENDED RELEASE ORAL at 22:50

## 2025-08-09 RX ADMIN — EPOETIN ALFA 10000 UNIT(S): 10000 SOLUTION INTRAVENOUS; SUBCUTANEOUS at 10:53

## 2025-08-09 RX ADMIN — Medication 2 TABLET(S): at 22:51

## 2025-08-09 RX ADMIN — Medication 40 MILLIGRAM(S): at 06:01

## 2025-08-09 RX ADMIN — SEVELAMER HYDROCHLORIDE 800 MILLIGRAM(S): 800 TABLET ORAL at 08:56

## 2025-08-09 RX ADMIN — ATORVASTATIN CALCIUM 10 MILLIGRAM(S): 80 TABLET, FILM COATED ORAL at 22:50

## 2025-08-09 RX ADMIN — AMIODARONE HYDROCHLORIDE 200 MILLIGRAM(S): 50 INJECTION, SOLUTION INTRAVENOUS at 22:51

## 2025-08-09 RX ADMIN — SEVELAMER HYDROCHLORIDE 800 MILLIGRAM(S): 800 TABLET ORAL at 17:15

## 2025-08-09 RX ADMIN — APIXABAN 5 MILLIGRAM(S): 5 TABLET, FILM COATED ORAL at 17:14

## 2025-08-10 LAB
ANION GAP SERPL CALC-SCNC: 15 MMOL/L — SIGNIFICANT CHANGE UP (ref 5–17)
BASOPHILS # BLD AUTO: 0.01 K/UL — SIGNIFICANT CHANGE UP (ref 0–0.2)
BASOPHILS # BLD MANUAL: 0.02 K/UL — SIGNIFICANT CHANGE UP (ref 0–0.2)
BASOPHILS NFR BLD AUTO: 0.5 % — SIGNIFICANT CHANGE UP (ref 0–2)
BASOPHILS NFR BLD MANUAL: 0.8 % — SIGNIFICANT CHANGE UP (ref 0–2)
BUN SERPL-MCNC: 34 MG/DL — HIGH (ref 7–23)
CALCIUM SERPL-MCNC: 9 MG/DL — SIGNIFICANT CHANGE UP (ref 8.4–10.5)
CHLORIDE SERPL-SCNC: 100 MMOL/L — SIGNIFICANT CHANGE UP (ref 96–108)
CO2 SERPL-SCNC: 25 MMOL/L — SIGNIFICANT CHANGE UP (ref 22–31)
CREAT SERPL-MCNC: 5.79 MG/DL — HIGH (ref 0.5–1.3)
EGFR: 10 ML/MIN/1.73M2 — LOW
EGFR: 10 ML/MIN/1.73M2 — LOW
EOSINOPHIL # BLD AUTO: 0.12 K/UL — SIGNIFICANT CHANGE UP (ref 0–0.5)
EOSINOPHIL # BLD MANUAL: 0.09 K/UL — SIGNIFICANT CHANGE UP (ref 0–0.5)
EOSINOPHIL NFR BLD AUTO: 5.9 % — SIGNIFICANT CHANGE UP (ref 0–6)
EOSINOPHIL NFR BLD MANUAL: 4.2 % — SIGNIFICANT CHANGE UP (ref 0–6)
GIANT PLATELETS BLD QL SMEAR: PRESENT
GLUCOSE SERPL-MCNC: 76 MG/DL — SIGNIFICANT CHANGE UP (ref 70–99)
HCT VFR BLD CALC: 28.5 % — LOW (ref 39–50)
HGB BLD-MCNC: 8.7 G/DL — LOW (ref 13–17)
IMM GRANULOCYTES # BLD AUTO: 0.01 K/UL — SIGNIFICANT CHANGE UP (ref 0–0.07)
IMM GRANULOCYTES NFR BLD AUTO: 0.5 % — SIGNIFICANT CHANGE UP (ref 0–0.9)
IMMATURE PLATELET FRACTION #: 1.7 K/UL — LOW (ref 3.9–12.5)
IMMATURE PLATELET FRACTION %: 2 % — SIGNIFICANT CHANGE UP (ref 1.6–7.1)
LYMPHOCYTES # BLD AUTO: 0.81 K/UL — LOW (ref 1–3.3)
LYMPHOCYTES # BLD MANUAL: 0.65 K/UL — LOW (ref 1–3.3)
LYMPHOCYTES NFR BLD AUTO: 39.9 % — SIGNIFICANT CHANGE UP (ref 13–44)
LYMPHOCYTES NFR BLD MANUAL: 32.2 % — SIGNIFICANT CHANGE UP (ref 13–44)
MAGNESIUM SERPL-MCNC: 1.9 MG/DL — SIGNIFICANT CHANGE UP (ref 1.6–2.6)
MCHC RBC-ENTMCNC: 29.3 PG — SIGNIFICANT CHANGE UP (ref 27–34)
MCHC RBC-ENTMCNC: 30.5 G/DL — LOW (ref 32–36)
MCV RBC AUTO: 96 FL — SIGNIFICANT CHANGE UP (ref 80–100)
MONOCYTES # BLD AUTO: 0.21 K/UL — SIGNIFICANT CHANGE UP (ref 0–0.9)
MONOCYTES # BLD MANUAL: 0.12 K/UL — SIGNIFICANT CHANGE UP (ref 0–0.9)
MONOCYTES NFR BLD AUTO: 10.3 % — SIGNIFICANT CHANGE UP (ref 2–14)
MONOCYTES NFR BLD MANUAL: 5.9 % — SIGNIFICANT CHANGE UP (ref 2–14)
NEUTROPHILS # BLD AUTO: 0.87 K/UL — LOW (ref 1.8–7.4)
NEUTROPHILS # BLD MANUAL: 1.16 K/UL — LOW (ref 1.8–7.4)
NEUTROPHILS NFR BLD AUTO: 42.9 % — LOW (ref 43–77)
NEUTROPHILS NFR BLD MANUAL: 56.9 % — SIGNIFICANT CHANGE UP (ref 43–77)
NRBC # BLD AUTO: 0 K/UL — SIGNIFICANT CHANGE UP (ref 0–0)
NRBC # FLD: 0 K/UL — SIGNIFICANT CHANGE UP (ref 0–0)
NRBC BLD AUTO-RTO: 0 /100 WBCS — SIGNIFICANT CHANGE UP (ref 0–0)
PHOSPHATE SERPL-MCNC: 4.7 MG/DL — HIGH (ref 2.5–4.5)
PLAT MORPH BLD: ABNORMAL
PLATELET # BLD AUTO: 86 K/UL — LOW (ref 150–400)
PMV BLD: 9.6 FL — SIGNIFICANT CHANGE UP (ref 7–13)
POTASSIUM SERPL-MCNC: 4.2 MMOL/L — SIGNIFICANT CHANGE UP (ref 3.5–5.3)
POTASSIUM SERPL-SCNC: 4.2 MMOL/L — SIGNIFICANT CHANGE UP (ref 3.5–5.3)
RBC # BLD: 2.97 M/UL — LOW (ref 4.2–5.8)
RBC # FLD: 16.6 % — HIGH (ref 10.3–14.5)
RBC BLD AUTO: SIGNIFICANT CHANGE UP
SODIUM SERPL-SCNC: 140 MMOL/L — SIGNIFICANT CHANGE UP (ref 135–145)
WBC # BLD: 2.03 K/UL — LOW (ref 3.8–10.5)
WBC # FLD AUTO: 2.03 K/UL — LOW (ref 3.8–10.5)

## 2025-08-10 RX ORDER — ISOPROPYL ALCOHOL, BENZOCAINE .7; .06 ML/ML; ML/ML
0 SWAB TOPICAL
Qty: 100 | Refills: 1
Start: 2025-08-10

## 2025-08-10 RX ADMIN — APIXABAN 5 MILLIGRAM(S): 5 TABLET, FILM COATED ORAL at 17:04

## 2025-08-10 RX ADMIN — Medication 40 MILLIGRAM(S): at 05:14

## 2025-08-10 RX ADMIN — CYANOCOBALAMIN 1000 MICROGRAM(S): 1000 INJECTION INTRAMUSCULAR; SUBCUTANEOUS at 11:27

## 2025-08-10 RX ADMIN — SEVELAMER HYDROCHLORIDE 800 MILLIGRAM(S): 800 TABLET ORAL at 17:04

## 2025-08-10 RX ADMIN — SEVELAMER HYDROCHLORIDE 800 MILLIGRAM(S): 800 TABLET ORAL at 08:34

## 2025-08-10 RX ADMIN — Medication 81 MILLIGRAM(S): at 11:37

## 2025-08-10 RX ADMIN — SEVELAMER HYDROCHLORIDE 800 MILLIGRAM(S): 800 TABLET ORAL at 11:37

## 2025-08-10 RX ADMIN — ISOSORBIDE MONONITRATE 30 MILLIGRAM(S): 60 TABLET, EXTENDED RELEASE ORAL at 21:53

## 2025-08-10 RX ADMIN — ATORVASTATIN CALCIUM 10 MILLIGRAM(S): 80 TABLET, FILM COATED ORAL at 21:53

## 2025-08-10 RX ADMIN — AMIODARONE HYDROCHLORIDE 200 MILLIGRAM(S): 50 INJECTION, SOLUTION INTRAVENOUS at 21:53

## 2025-08-10 RX ADMIN — Medication 1 APPLICATION(S): at 11:37

## 2025-08-10 RX ADMIN — Medication 2 TABLET(S): at 21:54

## 2025-08-10 RX ADMIN — APIXABAN 5 MILLIGRAM(S): 5 TABLET, FILM COATED ORAL at 05:14

## 2025-08-11 ENCOUNTER — TRANSCRIPTION ENCOUNTER (OUTPATIENT)
Age: 60
End: 2025-08-11

## 2025-08-11 VITALS
DIASTOLIC BLOOD PRESSURE: 66 MMHG | OXYGEN SATURATION: 96 % | RESPIRATION RATE: 18 BRPM | SYSTOLIC BLOOD PRESSURE: 159 MMHG | HEART RATE: 63 BPM | TEMPERATURE: 98 F

## 2025-08-11 LAB
ANION GAP SERPL CALC-SCNC: 17 MMOL/L — SIGNIFICANT CHANGE UP (ref 5–17)
BUN SERPL-MCNC: 54 MG/DL — HIGH (ref 7–23)
CALCIUM SERPL-MCNC: 9 MG/DL — SIGNIFICANT CHANGE UP (ref 8.4–10.5)
CHLORIDE SERPL-SCNC: 98 MMOL/L — SIGNIFICANT CHANGE UP (ref 96–108)
CO2 SERPL-SCNC: 25 MMOL/L — SIGNIFICANT CHANGE UP (ref 22–31)
CREAT SERPL-MCNC: 8.6 MG/DL — HIGH (ref 0.5–1.3)
EGFR: 6 ML/MIN/1.73M2 — LOW
EGFR: 6 ML/MIN/1.73M2 — LOW
GLUCOSE SERPL-MCNC: 76 MG/DL — SIGNIFICANT CHANGE UP (ref 70–99)
HCT VFR BLD CALC: 28 % — LOW (ref 39–50)
HGB BLD-MCNC: 8.5 G/DL — LOW (ref 13–17)
IMMATURE PLATELET FRACTION #: 1.5 K/UL — LOW (ref 3.9–12.5)
IMMATURE PLATELET FRACTION %: 1.6 % — SIGNIFICANT CHANGE UP (ref 1.6–7.1)
MAGNESIUM SERPL-MCNC: 2 MG/DL — SIGNIFICANT CHANGE UP (ref 1.6–2.6)
MCHC RBC-ENTMCNC: 29 PG — SIGNIFICANT CHANGE UP (ref 27–34)
MCHC RBC-ENTMCNC: 30.4 G/DL — LOW (ref 32–36)
MCV RBC AUTO: 95.6 FL — SIGNIFICANT CHANGE UP (ref 80–100)
NRBC # BLD AUTO: 0 K/UL — SIGNIFICANT CHANGE UP (ref 0–0)
NRBC # FLD: 0 K/UL — SIGNIFICANT CHANGE UP (ref 0–0)
NRBC BLD AUTO-RTO: 0 /100 WBCS — SIGNIFICANT CHANGE UP (ref 0–0)
PHOSPHATE SERPL-MCNC: 5.2 MG/DL — HIGH (ref 2.5–4.5)
PLATELET # BLD AUTO: 94 K/UL — LOW (ref 150–400)
PMV BLD: 9.7 FL — SIGNIFICANT CHANGE UP (ref 7–13)
POTASSIUM SERPL-MCNC: 4.7 MMOL/L — SIGNIFICANT CHANGE UP (ref 3.5–5.3)
POTASSIUM SERPL-SCNC: 4.7 MMOL/L — SIGNIFICANT CHANGE UP (ref 3.5–5.3)
RBC # BLD: 2.93 M/UL — LOW (ref 4.2–5.8)
RBC # FLD: 16.5 % — HIGH (ref 10.3–14.5)
SODIUM SERPL-SCNC: 140 MMOL/L — SIGNIFICANT CHANGE UP (ref 135–145)
WBC # BLD: 2.53 K/UL — LOW (ref 3.8–10.5)
WBC # FLD AUTO: 2.53 K/UL — LOW (ref 3.8–10.5)

## 2025-08-11 PROCEDURE — 84132 ASSAY OF SERUM POTASSIUM: CPT

## 2025-08-11 PROCEDURE — 83605 ASSAY OF LACTIC ACID: CPT

## 2025-08-11 PROCEDURE — 71045 X-RAY EXAM CHEST 1 VIEW: CPT

## 2025-08-11 PROCEDURE — 36415 COLL VENOUS BLD VENIPUNCTURE: CPT

## 2025-08-11 PROCEDURE — 87521 HEPATITIS C PROBE&RVRS TRNSC: CPT

## 2025-08-11 PROCEDURE — 87641 MR-STAPH DNA AMP PROBE: CPT

## 2025-08-11 PROCEDURE — 83615 LACTATE (LD) (LDH) ENZYME: CPT

## 2025-08-11 PROCEDURE — 85384 FIBRINOGEN ACTIVITY: CPT

## 2025-08-11 PROCEDURE — 82947 ASSAY GLUCOSE BLOOD QUANT: CPT

## 2025-08-11 PROCEDURE — 82247 BILIRUBIN TOTAL: CPT

## 2025-08-11 PROCEDURE — 84443 ASSAY THYROID STIM HORMONE: CPT

## 2025-08-11 PROCEDURE — 86747 PARVOVIRUS ANTIBODY: CPT

## 2025-08-11 PROCEDURE — 85025 COMPLETE CBC W/AUTO DIFF WBC: CPT

## 2025-08-11 PROCEDURE — 82803 BLOOD GASES ANY COMBINATION: CPT

## 2025-08-11 PROCEDURE — 87799 DETECT AGENT NOS DNA QUANT: CPT

## 2025-08-11 PROCEDURE — 71046 X-RAY EXAM CHEST 2 VIEWS: CPT

## 2025-08-11 PROCEDURE — 82330 ASSAY OF CALCIUM: CPT

## 2025-08-11 PROCEDURE — 85049 AUTOMATED PLATELET COUNT: CPT

## 2025-08-11 PROCEDURE — 83735 ASSAY OF MAGNESIUM: CPT

## 2025-08-11 PROCEDURE — 86900 BLOOD TYPING SEROLOGIC ABO: CPT

## 2025-08-11 PROCEDURE — 82607 VITAMIN B-12: CPT

## 2025-08-11 PROCEDURE — 85027 COMPLETE CBC AUTOMATED: CPT

## 2025-08-11 PROCEDURE — 84100 ASSAY OF PHOSPHORUS: CPT

## 2025-08-11 PROCEDURE — 82272 OCCULT BLD FECES 1-3 TESTS: CPT

## 2025-08-11 PROCEDURE — 84295 ASSAY OF SERUM SODIUM: CPT

## 2025-08-11 PROCEDURE — 85018 HEMOGLOBIN: CPT

## 2025-08-11 PROCEDURE — 83540 ASSAY OF IRON: CPT

## 2025-08-11 PROCEDURE — 85730 THROMBOPLASTIN TIME PARTIAL: CPT

## 2025-08-11 PROCEDURE — 87640 STAPH A DNA AMP PROBE: CPT

## 2025-08-11 PROCEDURE — 80074 ACUTE HEPATITIS PANEL: CPT

## 2025-08-11 PROCEDURE — 93005 ELECTROCARDIOGRAM TRACING: CPT

## 2025-08-11 PROCEDURE — 86704 HEP B CORE ANTIBODY TOTAL: CPT

## 2025-08-11 PROCEDURE — 99261: CPT

## 2025-08-11 PROCEDURE — 85610 PROTHROMBIN TIME: CPT

## 2025-08-11 PROCEDURE — 86901 BLOOD TYPING SEROLOGIC RH(D): CPT

## 2025-08-11 PROCEDURE — 82668 ASSAY OF ERYTHROPOIETIN: CPT

## 2025-08-11 PROCEDURE — 85379 FIBRIN DEGRADATION QUANT: CPT

## 2025-08-11 PROCEDURE — 83550 IRON BINDING TEST: CPT

## 2025-08-11 PROCEDURE — 74181 MRI ABDOMEN W/O CONTRAST: CPT

## 2025-08-11 PROCEDURE — 99285 EMERGENCY DEPT VISIT HI MDM: CPT

## 2025-08-11 PROCEDURE — 82728 ASSAY OF FERRITIN: CPT

## 2025-08-11 PROCEDURE — 87389 HIV-1 AG W/HIV-1&-2 AB AG IA: CPT

## 2025-08-11 PROCEDURE — 82746 ASSAY OF FOLIC ACID SERUM: CPT

## 2025-08-11 PROCEDURE — 83010 ASSAY OF HAPTOGLOBIN QUANT: CPT

## 2025-08-11 PROCEDURE — 82248 BILIRUBIN DIRECT: CPT

## 2025-08-11 PROCEDURE — 86850 RBC ANTIBODY SCREEN: CPT

## 2025-08-11 PROCEDURE — 87522 HEPATITIS C REVRS TRNSCRPJ: CPT

## 2025-08-11 PROCEDURE — 85045 AUTOMATED RETICULOCYTE COUNT: CPT

## 2025-08-11 PROCEDURE — 80048 BASIC METABOLIC PNL TOTAL CA: CPT

## 2025-08-11 PROCEDURE — 82435 ASSAY OF BLOOD CHLORIDE: CPT

## 2025-08-11 PROCEDURE — 85014 HEMATOCRIT: CPT

## 2025-08-11 PROCEDURE — 84484 ASSAY OF TROPONIN QUANT: CPT

## 2025-08-11 PROCEDURE — 83880 ASSAY OF NATRIURETIC PEPTIDE: CPT

## 2025-08-11 PROCEDURE — 80053 COMPREHEN METABOLIC PANEL: CPT

## 2025-08-11 PROCEDURE — 82962 GLUCOSE BLOOD TEST: CPT

## 2025-08-11 PROCEDURE — 83921 ORGANIC ACID SINGLE QUANT: CPT

## 2025-08-11 PROCEDURE — 83090 ASSAY OF HOMOCYSTEINE: CPT

## 2025-08-11 RX ORDER — SEVELAMER HYDROCHLORIDE 800 MG/1
1 TABLET ORAL
Refills: 0 | DISCHARGE

## 2025-08-11 RX ORDER — POLYETHYLENE GLYCOL 3350 17 G/17G
17 POWDER, FOR SOLUTION ORAL
Qty: 0 | Refills: 0 | DISCHARGE
Start: 2025-08-11

## 2025-08-11 RX ORDER — SEVELAMER HYDROCHLORIDE 800 MG/1
1 TABLET ORAL
Qty: 90 | Refills: 0
Start: 2025-08-11 | End: 2025-09-09

## 2025-08-11 RX ORDER — CYANOCOBALAMIN 1000 UG/ML
1000 INJECTION INTRAMUSCULAR; SUBCUTANEOUS
Qty: 0 | Refills: 0 | DISCHARGE

## 2025-08-11 RX ORDER — SENNA 187 MG
2 TABLET ORAL
Qty: 0 | Refills: 0 | DISCHARGE
Start: 2025-08-11

## 2025-08-11 RX ORDER — CYANOCOBALAMIN 1000 UG/ML
1 INJECTION INTRAMUSCULAR; SUBCUTANEOUS
Qty: 30 | Refills: 0
Start: 2025-08-11 | End: 2025-09-09

## 2025-08-11 RX ADMIN — POLYETHYLENE GLYCOL 3350 17 GRAM(S): 17 POWDER, FOR SOLUTION ORAL at 11:45

## 2025-08-11 RX ADMIN — SEVELAMER HYDROCHLORIDE 800 MILLIGRAM(S): 800 TABLET ORAL at 11:45

## 2025-08-11 RX ADMIN — CYANOCOBALAMIN 1000 MICROGRAM(S): 1000 INJECTION INTRAMUSCULAR; SUBCUTANEOUS at 14:29

## 2025-08-11 RX ADMIN — SEVELAMER HYDROCHLORIDE 800 MILLIGRAM(S): 800 TABLET ORAL at 09:56

## 2025-08-11 RX ADMIN — Medication 1 APPLICATION(S): at 11:44

## 2025-08-11 RX ADMIN — APIXABAN 5 MILLIGRAM(S): 5 TABLET, FILM COATED ORAL at 05:46

## 2025-08-11 RX ADMIN — Medication 40 MILLIGRAM(S): at 05:46

## 2025-08-11 RX ADMIN — Medication 81 MILLIGRAM(S): at 11:45

## 2025-08-12 RX ORDER — CYANOCOBALAMIN 1000 UG/ML
1000 INJECTION INTRAMUSCULAR; SUBCUTANEOUS
Qty: 4 | Refills: 0
Start: 2025-08-12 | End: 2025-08-18

## 2025-08-12 RX ORDER — CYANOCOBALAMIN 1000 UG/ML
1000 INJECTION INTRAMUSCULAR; SUBCUTANEOUS
Qty: 3 | Refills: 0
Start: 2025-08-12 | End: 2025-08-14

## 2025-08-12 RX ORDER — CYANOCOBALAMIN 1000 UG/ML
1000 INJECTION INTRAMUSCULAR; SUBCUTANEOUS
Qty: 4 | Refills: 0
Start: 2025-08-12 | End: 2025-08-14

## 2025-08-16 LAB
HOMOCYSTEINE LEVEL: 23.4 UMOL/L — HIGH (ref 0–17.2)
METHYLMALONATE SERPL-SCNC: 2543 NMOL/L — HIGH (ref 0–378)

## 2025-08-26 ENCOUNTER — APPOINTMENT (OUTPATIENT)
Dept: GASTROENTEROLOGY | Facility: CLINIC | Age: 60
End: 2025-08-26

## 2025-09-03 ENCOUNTER — APPOINTMENT (OUTPATIENT)
Dept: CARDIOTHORACIC SURGERY | Facility: CLINIC | Age: 60
End: 2025-09-03

## 2025-09-03 VITALS
RESPIRATION RATE: 16 BRPM | WEIGHT: 158.73 LBS | SYSTOLIC BLOOD PRESSURE: 127 MMHG | BODY MASS INDEX: 24.06 KG/M2 | HEIGHT: 68 IN | DIASTOLIC BLOOD PRESSURE: 73 MMHG | HEART RATE: 82 BPM | OXYGEN SATURATION: 98 %

## 2025-09-03 DIAGNOSIS — I07.1 RHEUMATIC TRICUSPID INSUFFICIENCY: ICD-10-CM

## 2025-09-03 RX ORDER — SEVELAMER HYDROCHLORIDE 400 MG/1
400 TABLET, FILM COATED ORAL 3 TIMES DAILY
Refills: 0 | Status: ACTIVE | COMMUNITY

## (undated) DEVICE — CATH IV SAFE BC 20G X 1.16" (PINK)

## (undated) DEVICE — PACK IV START WITH CHG

## (undated) DEVICE — CATH IV SAFE BC 22G X 1" (BLUE)

## (undated) DEVICE — TUBING SUCTION 20FT

## (undated) DEVICE — CLAMP BX HOT RAD JAW 3

## (undated) DEVICE — BITE BLOCK ADULT 20 X 27MM (GREEN)

## (undated) DEVICE — TUBING SUCTION CONN 6FT STERILE

## (undated) DEVICE — TUBING IV SET GRAVITY 3Y 100" MACRO

## (undated) DEVICE — SUCTION YANKAUER NO CONTROL VENT

## (undated) DEVICE — POLY TRAP ETRAP

## (undated) DEVICE — ELCTR GROUNDING PAD ADULT COVIDIEN

## (undated) DEVICE — IRRIGATOR BIO SHIELD

## (undated) DEVICE — SOL INJ NS 0.9% 500ML 2 PORT

## (undated) DEVICE — FORCEP RADIAL JAW 4 JUMBO 2.8MM 3.2MM 240CM ORANGE DISP

## (undated) DEVICE — FOLEY HOLDER STATLOCK 2 WAY ADULT

## (undated) DEVICE — SYR LUER LOK 50CC

## (undated) DEVICE — BRUSH COLONOSCOPY CYTOLOGY

## (undated) DEVICE — BIOPSY FORCEP RADIAL JAW 4 STANDARD WITH NEEDLE

## (undated) DEVICE — SYR ALLIANCE II INFLATION 60ML

## (undated) DEVICE — SENSOR O2 FINGER ADULT

## (undated) DEVICE — BALLOON US ENDO